# Patient Record
Sex: FEMALE | Race: BLACK OR AFRICAN AMERICAN | NOT HISPANIC OR LATINO
[De-identification: names, ages, dates, MRNs, and addresses within clinical notes are randomized per-mention and may not be internally consistent; named-entity substitution may affect disease eponyms.]

---

## 2017-01-09 ENCOUNTER — MEDICATION RENEWAL (OUTPATIENT)
Age: 72
End: 2017-01-09

## 2017-01-26 ENCOUNTER — APPOINTMENT (OUTPATIENT)
Dept: ENDOCRINOLOGY | Facility: CLINIC | Age: 72
End: 2017-01-26

## 2017-01-26 VITALS
WEIGHT: 172.2 LBS | HEIGHT: 64 IN | SYSTOLIC BLOOD PRESSURE: 110 MMHG | HEART RATE: 82 BPM | OXYGEN SATURATION: 96 % | DIASTOLIC BLOOD PRESSURE: 60 MMHG | BODY MASS INDEX: 29.4 KG/M2

## 2017-01-26 DIAGNOSIS — Z83.3 FAMILY HISTORY OF DIABETES MELLITUS: ICD-10-CM

## 2017-01-30 ENCOUNTER — RX RENEWAL (OUTPATIENT)
Age: 72
End: 2017-01-30

## 2017-02-13 ENCOUNTER — APPOINTMENT (OUTPATIENT)
Dept: NEUROLOGY | Facility: CLINIC | Age: 72
End: 2017-02-13

## 2017-02-13 VITALS
DIASTOLIC BLOOD PRESSURE: 54 MMHG | HEART RATE: 67 BPM | WEIGHT: 172 LBS | HEIGHT: 64 IN | SYSTOLIC BLOOD PRESSURE: 94 MMHG | BODY MASS INDEX: 29.37 KG/M2

## 2017-02-13 RX ORDER — AMMONIUM LACTATE 12 %
12 CREAM (GRAM) TOPICAL TWICE DAILY
Qty: 60 | Refills: 1 | Status: ACTIVE | COMMUNITY
Start: 2017-01-26

## 2017-02-28 ENCOUNTER — APPOINTMENT (OUTPATIENT)
Dept: NEUROLOGY | Facility: CLINIC | Age: 72
End: 2017-02-28

## 2017-03-30 ENCOUNTER — APPOINTMENT (OUTPATIENT)
Dept: NEUROLOGY | Facility: CLINIC | Age: 72
End: 2017-03-30

## 2017-04-04 ENCOUNTER — APPOINTMENT (OUTPATIENT)
Dept: NEUROLOGY | Facility: CLINIC | Age: 72
End: 2017-04-04

## 2017-04-15 ENCOUNTER — RX RENEWAL (OUTPATIENT)
Age: 72
End: 2017-04-15

## 2017-04-26 ENCOUNTER — RESULT CHARGE (OUTPATIENT)
Age: 72
End: 2017-04-26

## 2017-04-26 ENCOUNTER — APPOINTMENT (OUTPATIENT)
Dept: ENDOCRINOLOGY | Facility: CLINIC | Age: 72
End: 2017-04-26

## 2017-04-26 VITALS
DIASTOLIC BLOOD PRESSURE: 60 MMHG | SYSTOLIC BLOOD PRESSURE: 100 MMHG | BODY MASS INDEX: 28.34 KG/M2 | HEIGHT: 64 IN | WEIGHT: 166 LBS | OXYGEN SATURATION: 98 % | HEART RATE: 68 BPM

## 2017-04-26 LAB
GLUCOSE BLDC GLUCOMTR-MCNC: 137
HBA1C MFR BLD HPLC: 6.6

## 2017-05-02 ENCOUNTER — APPOINTMENT (OUTPATIENT)
Dept: NEUROLOGY | Facility: CLINIC | Age: 72
End: 2017-05-02

## 2017-05-18 ENCOUNTER — APPOINTMENT (OUTPATIENT)
Dept: CT IMAGING | Facility: IMAGING CENTER | Age: 72
End: 2017-05-18

## 2017-06-17 ENCOUNTER — APPOINTMENT (OUTPATIENT)
Dept: CT IMAGING | Facility: CLINIC | Age: 72
End: 2017-06-17

## 2017-07-14 ENCOUNTER — RX RENEWAL (OUTPATIENT)
Age: 72
End: 2017-07-14

## 2017-08-17 ENCOUNTER — EMERGENCY (EMERGENCY)
Facility: HOSPITAL | Age: 72
LOS: 0 days | Discharge: ANOTHER HOSPITAL | End: 2017-08-17
Attending: EMERGENCY MEDICINE
Payer: MEDICARE

## 2017-08-17 ENCOUNTER — EMERGENCY (EMERGENCY)
Facility: HOSPITAL | Age: 72
LOS: 1 days | Discharge: HOME | End: 2017-08-17
Attending: EMERGENCY MEDICINE | Admitting: EMERGENCY MEDICINE
Payer: MEDICARE

## 2017-08-17 VITALS
RESPIRATION RATE: 18 BRPM | DIASTOLIC BLOOD PRESSURE: 70 MMHG | OXYGEN SATURATION: 99 % | HEART RATE: 62 BPM | TEMPERATURE: 98 F | SYSTOLIC BLOOD PRESSURE: 144 MMHG

## 2017-08-17 VITALS
SYSTOLIC BLOOD PRESSURE: 125 MMHG | TEMPERATURE: 99 F | WEIGHT: 160.06 LBS | RESPIRATION RATE: 16 BRPM | OXYGEN SATURATION: 98 % | HEIGHT: 62 IN | DIASTOLIC BLOOD PRESSURE: 67 MMHG | HEART RATE: 64 BPM

## 2017-08-17 VITALS
HEART RATE: 67 BPM | RESPIRATION RATE: 16 BRPM | TEMPERATURE: 99 F | DIASTOLIC BLOOD PRESSURE: 67 MMHG | SYSTOLIC BLOOD PRESSURE: 122 MMHG | OXYGEN SATURATION: 100 %

## 2017-08-17 DIAGNOSIS — Z95.5 PRESENCE OF CORONARY ANGIOPLASTY IMPLANT AND GRAFT: Chronic | ICD-10-CM

## 2017-08-17 DIAGNOSIS — Z95.0 PRESENCE OF CARDIAC PACEMAKER: Chronic | ICD-10-CM

## 2017-08-17 DIAGNOSIS — Z95.810 PRESENCE OF AUTOMATIC (IMPLANTABLE) CARDIAC DEFIBRILLATOR: Chronic | ICD-10-CM

## 2017-08-17 LAB
ALBUMIN SERPL ELPH-MCNC: 3.5 G/DL — SIGNIFICANT CHANGE UP (ref 3.3–5)
ALP SERPL-CCNC: 46 U/L — SIGNIFICANT CHANGE UP (ref 40–120)
ALT FLD-CCNC: 45 U/L — SIGNIFICANT CHANGE UP (ref 12–78)
ANION GAP SERPL CALC-SCNC: 7 MMOL/L — SIGNIFICANT CHANGE UP (ref 5–17)
APTT BLD: 29.2 SEC — SIGNIFICANT CHANGE UP (ref 27.5–37.4)
AST SERPL-CCNC: 39 U/L — HIGH (ref 15–37)
BASE EXCESS BLDV CALC-SCNC: 6.8 MMOL/L — SIGNIFICANT CHANGE UP
BASOPHILS # BLD AUTO: 0.04 K/UL — SIGNIFICANT CHANGE UP (ref 0–0.2)
BASOPHILS # BLD AUTO: 0.1 K/UL — SIGNIFICANT CHANGE UP (ref 0–0.2)
BASOPHILS NFR BLD AUTO: 0.5 % — SIGNIFICANT CHANGE UP (ref 0–2)
BASOPHILS NFR BLD AUTO: 1.2 % — SIGNIFICANT CHANGE UP (ref 0–2)
BILIRUB SERPL-MCNC: 0.6 MG/DL — SIGNIFICANT CHANGE UP (ref 0.2–1.2)
BLOOD GAS VENOUS - CREATININE: 1.1 MG/DL — SIGNIFICANT CHANGE UP (ref 0.5–1.3)
BUN SERPL-MCNC: 25 MG/DL — HIGH (ref 7–23)
CALCIUM SERPL-MCNC: 9.4 MG/DL — SIGNIFICANT CHANGE UP (ref 8.5–10.1)
CHLORIDE BLDV-SCNC: 102 MMOL/L — SIGNIFICANT CHANGE UP (ref 96–108)
CHLORIDE SERPL-SCNC: 102 MMOL/L — SIGNIFICANT CHANGE UP (ref 96–108)
CK SERPL-CCNC: 595 U/L — HIGH (ref 25–170)
CO2 SERPL-SCNC: 33 MMOL/L — HIGH (ref 22–31)
CREAT SERPL-MCNC: 1.4 MG/DL — HIGH (ref 0.5–1.3)
EOSINOPHIL # BLD AUTO: 0.1 K/UL — SIGNIFICANT CHANGE UP (ref 0–0.5)
EOSINOPHIL # BLD AUTO: 0.13 K/UL — SIGNIFICANT CHANGE UP (ref 0–0.5)
EOSINOPHIL NFR BLD AUTO: 1.7 % — SIGNIFICANT CHANGE UP (ref 0–6)
EOSINOPHIL NFR BLD AUTO: 2.2 % — SIGNIFICANT CHANGE UP (ref 0–6)
GAS PNL BLDV: 140 MMOL/L — SIGNIFICANT CHANGE UP (ref 136–146)
GLUCOSE BLDV-MCNC: 100 — HIGH (ref 70–99)
GLUCOSE SERPL-MCNC: 109 MG/DL — HIGH (ref 70–99)
HCO3 BLDV-SCNC: 29 MMOL/L — HIGH (ref 20–27)
HCT VFR BLD CALC: 37.7 % — SIGNIFICANT CHANGE UP (ref 34.5–45)
HCT VFR BLD CALC: 39.8 % — SIGNIFICANT CHANGE UP (ref 34.5–45)
HCT VFR BLDV CALC: 41.1 % — SIGNIFICANT CHANGE UP (ref 34.5–45)
HGB BLD-MCNC: 13 G/DL — SIGNIFICANT CHANGE UP (ref 11.5–15.5)
HGB BLD-MCNC: 13.4 G/DL — SIGNIFICANT CHANGE UP (ref 11.5–15.5)
HGB BLDV-MCNC: 13.4 G/DL — SIGNIFICANT CHANGE UP (ref 11.5–15.5)
IMM GRANULOCYTES # BLD AUTO: 0.02 # — SIGNIFICANT CHANGE UP
IMM GRANULOCYTES NFR BLD AUTO: 0.3 % — SIGNIFICANT CHANGE UP (ref 0–1.5)
INR BLD: 1.02 RATIO — SIGNIFICANT CHANGE UP (ref 0.88–1.16)
LACTATE BLDV-MCNC: 1 MMOL/L — SIGNIFICANT CHANGE UP (ref 0.5–2)
LYMPHOCYTES # BLD AUTO: 1.5 K/UL — SIGNIFICANT CHANGE UP (ref 1–3.3)
LYMPHOCYTES # BLD AUTO: 2.03 K/UL — SIGNIFICANT CHANGE UP (ref 1–3.3)
LYMPHOCYTES # BLD AUTO: 22.4 % — SIGNIFICANT CHANGE UP (ref 13–44)
LYMPHOCYTES # BLD AUTO: 26.7 % — SIGNIFICANT CHANGE UP (ref 13–44)
MCHC RBC-ENTMCNC: 31.5 PG — SIGNIFICANT CHANGE UP (ref 27–34)
MCHC RBC-ENTMCNC: 32.4 PG — SIGNIFICANT CHANGE UP (ref 27–34)
MCHC RBC-ENTMCNC: 33.7 % — SIGNIFICANT CHANGE UP (ref 32–36)
MCHC RBC-ENTMCNC: 34.4 GM/DL — SIGNIFICANT CHANGE UP (ref 32–36)
MCV RBC AUTO: 93.4 FL — SIGNIFICANT CHANGE UP (ref 80–100)
MCV RBC AUTO: 94.2 FL — SIGNIFICANT CHANGE UP (ref 80–100)
MONOCYTES # BLD AUTO: 0.79 K/UL — SIGNIFICANT CHANGE UP (ref 0–0.9)
MONOCYTES # BLD AUTO: 0.8 K/UL — SIGNIFICANT CHANGE UP (ref 0–0.9)
MONOCYTES NFR BLD AUTO: 10.4 % — SIGNIFICANT CHANGE UP (ref 2–14)
MONOCYTES NFR BLD AUTO: 11.3 % — SIGNIFICANT CHANGE UP (ref 2–14)
NEUTROPHILS # BLD AUTO: 4.3 K/UL — SIGNIFICANT CHANGE UP (ref 1.8–7.4)
NEUTROPHILS # BLD AUTO: 4.58 K/UL — SIGNIFICANT CHANGE UP (ref 1.8–7.4)
NEUTROPHILS NFR BLD AUTO: 60.4 % — SIGNIFICANT CHANGE UP (ref 43–77)
NEUTROPHILS NFR BLD AUTO: 62.9 % — SIGNIFICANT CHANGE UP (ref 43–77)
NRBC # FLD: 0 — SIGNIFICANT CHANGE UP
PCO2 BLDV: 58 MMHG — HIGH (ref 41–51)
PH BLDV: 7.36 PH — SIGNIFICANT CHANGE UP (ref 7.32–7.43)
PLATELET # BLD AUTO: 163 K/UL — SIGNIFICANT CHANGE UP (ref 150–400)
PLATELET # BLD AUTO: 185 K/UL — SIGNIFICANT CHANGE UP (ref 150–400)
PMV BLD: 10.4 FL — SIGNIFICANT CHANGE UP (ref 7–13)
PO2 BLDV: 38 MMHG — SIGNIFICANT CHANGE UP (ref 35–40)
POTASSIUM BLDV-SCNC: 3.2 MMOL/L — LOW (ref 3.4–4.5)
POTASSIUM SERPL-MCNC: 3.4 MMOL/L — LOW (ref 3.5–5.3)
POTASSIUM SERPL-SCNC: 3.4 MMOL/L — LOW (ref 3.5–5.3)
PROT SERPL-MCNC: 8.3 GM/DL — SIGNIFICANT CHANGE UP (ref 6–8.3)
PROTHROM AB SERPL-ACNC: 11.1 SEC — SIGNIFICANT CHANGE UP (ref 9.8–12.7)
RBC # BLD: 4.01 M/UL — SIGNIFICANT CHANGE UP (ref 3.8–5.2)
RBC # BLD: 4.26 M/UL — SIGNIFICANT CHANGE UP (ref 3.8–5.2)
RBC # FLD: 12.5 % — SIGNIFICANT CHANGE UP (ref 11–15)
RBC # FLD: 13.6 % — SIGNIFICANT CHANGE UP (ref 10.3–14.5)
SAO2 % BLDV: 65.1 % — SIGNIFICANT CHANGE UP (ref 60–85)
SODIUM SERPL-SCNC: 142 MMOL/L — SIGNIFICANT CHANGE UP (ref 135–145)
WBC # BLD: 6.8 K/UL — SIGNIFICANT CHANGE UP (ref 3.8–10.5)
WBC # BLD: 7.59 K/UL — SIGNIFICANT CHANGE UP (ref 3.8–10.5)
WBC # FLD AUTO: 6.8 K/UL — SIGNIFICANT CHANGE UP (ref 3.8–10.5)
WBC # FLD AUTO: 7.59 K/UL — SIGNIFICANT CHANGE UP (ref 3.8–10.5)

## 2017-08-17 PROCEDURE — 99284 EMERGENCY DEPT VISIT MOD MDM: CPT | Mod: 25

## 2017-08-17 PROCEDURE — 70490 CT SOFT TISSUE NECK W/O DYE: CPT | Mod: 26

## 2017-08-17 PROCEDURE — 71010: CPT | Mod: 26

## 2017-08-17 PROCEDURE — 93010 ELECTROCARDIOGRAM REPORT: CPT | Mod: 59

## 2017-08-17 PROCEDURE — 71250 CT THORAX DX C-: CPT | Mod: 26

## 2017-08-17 PROCEDURE — 99285 EMERGENCY DEPT VISIT HI MDM: CPT

## 2017-08-17 PROCEDURE — 70360 X-RAY EXAM OF NECK: CPT | Mod: 26

## 2017-08-17 RX ORDER — TETANUS TOXOID, REDUCED DIPHTHERIA TOXOID AND ACELLULAR PERTUSSIS VACCINE, ADSORBED 5; 2.5; 8; 8; 2.5 [IU]/.5ML; [IU]/.5ML; UG/.5ML; UG/.5ML; UG/.5ML
0.5 SUSPENSION INTRAMUSCULAR ONCE
Qty: 0 | Refills: 0 | Status: COMPLETED | OUTPATIENT
Start: 2017-08-17 | End: 2017-08-17

## 2017-08-17 RX ORDER — TETANUS AND DIPHTHERIA TOXOIDS ADSORBED 2; 2 [LF]/.5ML; [LF]/.5ML
0.5 INJECTION INTRAMUSCULAR ONCE
Qty: 0 | Refills: 0 | Status: DISCONTINUED | OUTPATIENT
Start: 2017-08-17 | End: 2017-08-17

## 2017-08-17 RX ADMIN — TETANUS TOXOID, REDUCED DIPHTHERIA TOXOID AND ACELLULAR PERTUSSIS VACCINE, ADSORBED 0.5 MILLILITER(S): 5; 2.5; 8; 8; 2.5 SUSPENSION INTRAMUSCULAR at 17:03

## 2017-08-17 RX ADMIN — Medication 100 MILLIGRAM(S): at 16:19

## 2017-08-17 NOTE — ED PROVIDER NOTE - OBJECTIVE STATEMENT
73 yo female presents with foreign body sensation to throat after eating fish. Patient denies chest pain, nausea. + difficulty handling secretions. Patient denies any other complaints.

## 2017-08-17 NOTE — ED PROVIDER NOTE - OBJECTIVE STATEMENT
73 yo F presenting with CC of throat pain, as well as chest pain and abdominal pain x 1 day. Pain began after patient swallowed fish bone while eating lunch. She was taken to [facility] where a clear airway was noted, with no stridor. A CT neck soft tissue done there showed no bone in the throat. A CT chest showed no foreign bodies in the esophagus, but did show a 1.3cm linear density in the proximal transverse colon. Patient was accepted for examination at [facility] by ENT Dr. Garza.     Patient was given a one time dose of clindamycin at [facility], as well as the TDAP vaccine. EKG done at [place] showed AV paced electronic pacemaker regular rhythm.     Endorses throat pain, chest pain, ab pain, nausea, and anxiety. Denies SOB.    In [facility] ER, patient continues to complain of throat, chest, and abdominal pain. Throat pain described as 10/10 in severity, with no radiation. Chest pain described as something stuck in chest, with no radiation.

## 2017-08-17 NOTE — ED ADULT NURSE NOTE - CCCP TRG CHIEF CMPLNT
feels fish bone stuck in throat seen by [facility] transfer  for higher level of care/sore throat

## 2017-08-17 NOTE — ED PROVIDER NOTE - PROGRESS NOTE DETAILS
Page GI by Dr. Varma repage GI page gen surg x 2, no response from GI d/w surgery, possible incidental finding, no rectal bleeding, tolerating PO, pt cleared for outpatient f/u.  Advised on CT results and need to f/u w/ PMD and GI.

## 2017-08-17 NOTE — ED ADULT NURSE NOTE - OBJECTIVE STATEMENT
rec'd pt in room 11 as tfr from [facility]. pt rports swallowed a fish bone at lunch and feels pain to throat and epigastrum,.  also has pain to chest but did not tell them at [facility]. pt had runs of v-tach. md aware.  placed on monitor. pt seen by ent.  blood being drawn.  ekg done. pieter

## 2017-08-17 NOTE — ED PROVIDER NOTE - MEDICAL DECISION MAKING DETAILS
MDM below 71 yo F presenting as transfer from [facility] for throat pain secondary to swallowing fish bone. No foreign body present on CT scan at [facility] and no foreign body observed on examination by ENT in [facility] ER. CT scan from [place] with evidence of 1.3 cm linear body in transverse colon. Will consult GI about any required interventions. MDM below

## 2017-08-17 NOTE — ED ADULT NURSE REASSESSMENT NOTE - NS ED NURSE REASSESS COMMENT FT1
Received in stretcher. A &Ox4. VSS.  No distress noted. Quietly resting. Being transferred via [facility] to [facility]. Report given prior by Katiuska Haider RN. Left arm #20 noted

## 2017-08-17 NOTE — ED PROVIDER NOTE - PMH
CAD (coronary artery disease)  s/p stent of mLAD, rPDA, rPLS  Chronic congestive heart failure, unspecified congestive heart failure type  s/p AICD  DM (diabetes mellitus)    HLD (hyperlipidemia)    HTN (hypertension)

## 2017-08-17 NOTE — ED PROVIDER NOTE - ATTENDING CONTRIBUTION TO CARE
Ramao  pt s/p fishbone ingestion with throat anfd low CP after ingestion  CI  shows ?bone on transverse colon  seen here and cleared by ENT  EKG paced  will check troponin (though CP coincident with ingestion and likely due to irritation)

## 2017-08-18 VITALS
OXYGEN SATURATION: 100 % | RESPIRATION RATE: 16 BRPM | SYSTOLIC BLOOD PRESSURE: 126 MMHG | DIASTOLIC BLOOD PRESSURE: 61 MMHG | HEART RATE: 66 BPM

## 2017-08-18 DIAGNOSIS — I48.91 UNSPECIFIED ATRIAL FIBRILLATION: ICD-10-CM

## 2017-08-18 DIAGNOSIS — E78.5 HYPERLIPIDEMIA, UNSPECIFIED: ICD-10-CM

## 2017-08-18 DIAGNOSIS — E11.9 TYPE 2 DIABETES MELLITUS WITHOUT COMPLICATIONS: ICD-10-CM

## 2017-08-18 DIAGNOSIS — R09.89 OTHER SPECIFIED SYMPTOMS AND SIGNS INVOLVING THE CIRCULATORY AND RESPIRATORY SYSTEMS: ICD-10-CM

## 2017-08-18 DIAGNOSIS — I10 ESSENTIAL (PRIMARY) HYPERTENSION: ICD-10-CM

## 2017-08-18 LAB
ALBUMIN SERPL ELPH-MCNC: 4 G/DL — SIGNIFICANT CHANGE UP (ref 3.3–5)
ALP SERPL-CCNC: 41 U/L — SIGNIFICANT CHANGE UP (ref 40–120)
ALT FLD-CCNC: 30 U/L — SIGNIFICANT CHANGE UP (ref 4–33)
AST SERPL-CCNC: 38 U/L — HIGH (ref 4–32)
BILIRUB SERPL-MCNC: 0.5 MG/DL — SIGNIFICANT CHANGE UP (ref 0.2–1.2)
BUN SERPL-MCNC: 23 MG/DL — SIGNIFICANT CHANGE UP (ref 7–23)
CALCIUM SERPL-MCNC: 10.1 MG/DL — SIGNIFICANT CHANGE UP (ref 8.4–10.5)
CHLORIDE SERPL-SCNC: 101 MMOL/L — SIGNIFICANT CHANGE UP (ref 98–107)
CK MB BLD-MCNC: 0.9 — SIGNIFICANT CHANGE UP (ref 0–2.5)
CK MB BLD-MCNC: 5.23 NG/ML — HIGH (ref 1–4.7)
CO2 SERPL-SCNC: 30 MMOL/L — SIGNIFICANT CHANGE UP (ref 22–31)
CREAT SERPL-MCNC: 1.2 MG/DL — SIGNIFICANT CHANGE UP (ref 0.5–1.3)
GLUCOSE SERPL-MCNC: 99 MG/DL — SIGNIFICANT CHANGE UP (ref 70–99)
POTASSIUM SERPL-MCNC: 3.2 MMOL/L — LOW (ref 3.5–5.3)
POTASSIUM SERPL-SCNC: 3.2 MMOL/L — LOW (ref 3.5–5.3)
PROT SERPL-MCNC: 7.8 G/DL — SIGNIFICANT CHANGE UP (ref 6–8.3)
SODIUM SERPL-SCNC: 145 MMOL/L — SIGNIFICANT CHANGE UP (ref 135–145)
TROPONIN T SERPL-MCNC: < 0.06 NG/ML — SIGNIFICANT CHANGE UP (ref 0–0.06)

## 2017-08-18 PROCEDURE — 93010 ELECTROCARDIOGRAM REPORT: CPT

## 2017-08-18 RX ORDER — POTASSIUM CHLORIDE 20 MEQ
40 PACKET (EA) ORAL ONCE
Qty: 0 | Refills: 0 | Status: COMPLETED | OUTPATIENT
Start: 2017-08-18 | End: 2017-08-18

## 2017-08-18 RX ADMIN — Medication 40 MILLIEQUIVALENT(S): at 09:33

## 2017-08-18 NOTE — ED ADULT NURSE REASSESSMENT NOTE - CONDITION
pt resting quietly. no c/o pain, stridor, drooling, sob. waiting for gi in am. no reports of vtach...

## 2017-09-07 ENCOUNTER — APPOINTMENT (OUTPATIENT)
Dept: ENDOCRINOLOGY | Facility: CLINIC | Age: 72
End: 2017-09-07
Payer: MEDICARE

## 2017-09-07 VITALS
WEIGHT: 166 LBS | DIASTOLIC BLOOD PRESSURE: 68 MMHG | BODY MASS INDEX: 28.34 KG/M2 | OXYGEN SATURATION: 97 % | SYSTOLIC BLOOD PRESSURE: 120 MMHG | HEIGHT: 64 IN | HEART RATE: 68 BPM

## 2017-09-07 LAB
GLUCOSE BLDC GLUCOMTR-MCNC: 125
HBA1C MFR BLD HPLC: 6.6

## 2017-09-07 PROCEDURE — 83036 HEMOGLOBIN GLYCOSYLATED A1C: CPT | Mod: QW

## 2017-09-07 PROCEDURE — 82962 GLUCOSE BLOOD TEST: CPT

## 2017-09-07 PROCEDURE — 99214 OFFICE O/P EST MOD 30 MIN: CPT

## 2017-09-07 RX ORDER — INSULIN DETEMIR 100 [IU]/ML
100 INJECTION, SOLUTION SUBCUTANEOUS
Qty: 5 | Refills: 3 | Status: DISCONTINUED | COMMUNITY
Start: 2017-04-26 | End: 2017-09-07

## 2017-10-31 ENCOUNTER — APPOINTMENT (OUTPATIENT)
Dept: NEUROLOGY | Facility: CLINIC | Age: 72
End: 2017-10-31
Payer: MEDICARE

## 2017-10-31 VITALS — DIASTOLIC BLOOD PRESSURE: 67 MMHG | SYSTOLIC BLOOD PRESSURE: 103 MMHG | HEART RATE: 68 BPM

## 2017-10-31 PROCEDURE — 99213 OFFICE O/P EST LOW 20 MIN: CPT

## 2017-12-18 ENCOUNTER — APPOINTMENT (OUTPATIENT)
Dept: ENDOCRINOLOGY | Facility: CLINIC | Age: 72
End: 2017-12-18
Payer: MEDICARE

## 2017-12-18 VITALS
BODY MASS INDEX: 28 KG/M2 | HEART RATE: 70 BPM | DIASTOLIC BLOOD PRESSURE: 76 MMHG | SYSTOLIC BLOOD PRESSURE: 110 MMHG | WEIGHT: 164 LBS | OXYGEN SATURATION: 97 % | HEIGHT: 64 IN

## 2017-12-18 LAB
GLUCOSE BLDC GLUCOMTR-MCNC: 112
HBA1C MFR BLD HPLC: 6.7

## 2017-12-18 PROCEDURE — 82962 GLUCOSE BLOOD TEST: CPT

## 2017-12-18 PROCEDURE — 99214 OFFICE O/P EST MOD 30 MIN: CPT | Mod: 25

## 2017-12-18 PROCEDURE — 83036 HEMOGLOBIN GLYCOSYLATED A1C: CPT | Mod: QW

## 2017-12-27 LAB
ALBUMIN SERPL ELPH-MCNC: 4.4 G/DL
ALP BLD-CCNC: 50 U/L
ALT SERPL-CCNC: 17 U/L
ANION GAP SERPL CALC-SCNC: 12 MMOL/L
AST SERPL-CCNC: 24 U/L
BILIRUB SERPL-MCNC: 0.3 MG/DL
BUN SERPL-MCNC: 69 MG/DL
CALCIUM SERPL-MCNC: 10.3 MG/DL
CHLORIDE SERPL-SCNC: 101 MMOL/L
CO2 SERPL-SCNC: 28 MMOL/L
CREAT SERPL-MCNC: 1.81 MG/DL
CREAT SPEC-SCNC: 50 MG/DL
GLUCOSE SERPL-MCNC: 112 MG/DL
MICROALBUMIN 24H UR DL<=1MG/L-MCNC: 0.8 MG/DL
MICROALBUMIN/CREAT 24H UR-RTO: 16 MG/G
POTASSIUM SERPL-SCNC: 4.9 MMOL/L
PROT SERPL-MCNC: 8.3 G/DL
SODIUM SERPL-SCNC: 141 MMOL/L

## 2018-01-18 ENCOUNTER — RX RENEWAL (OUTPATIENT)
Age: 73
End: 2018-01-18

## 2018-01-23 ENCOUNTER — APPOINTMENT (OUTPATIENT)
Dept: NEUROLOGY | Facility: CLINIC | Age: 73
End: 2018-01-23

## 2018-02-02 ENCOUNTER — RX RENEWAL (OUTPATIENT)
Age: 73
End: 2018-02-02

## 2018-02-12 ENCOUNTER — APPOINTMENT (OUTPATIENT)
Dept: CT IMAGING | Facility: CLINIC | Age: 73
End: 2018-02-12

## 2018-02-13 ENCOUNTER — INPATIENT (INPATIENT)
Facility: HOSPITAL | Age: 73
LOS: 4 days | Discharge: HOME | DRG: 315 | End: 2018-02-18
Attending: INTERNAL MEDICINE | Admitting: INTERNAL MEDICINE
Payer: MEDICARE

## 2018-02-13 ENCOUNTER — APPOINTMENT (OUTPATIENT)
Dept: NEPHROLOGY | Facility: CLINIC | Age: 73
End: 2018-02-13
Payer: MEDICARE

## 2018-02-13 VITALS
WEIGHT: 163.14 LBS | DIASTOLIC BLOOD PRESSURE: 58 MMHG | SYSTOLIC BLOOD PRESSURE: 79 MMHG | BODY MASS INDEX: 27.85 KG/M2 | HEART RATE: 91 BPM | OXYGEN SATURATION: 99 % | HEIGHT: 64 IN

## 2018-02-13 VITALS
SYSTOLIC BLOOD PRESSURE: 115 MMHG | OXYGEN SATURATION: 98 % | TEMPERATURE: 99 F | HEIGHT: 64 IN | HEART RATE: 85 BPM | WEIGHT: 160.06 LBS | DIASTOLIC BLOOD PRESSURE: 73 MMHG | RESPIRATION RATE: 26 BRPM

## 2018-02-13 DIAGNOSIS — Z95.0 PRESENCE OF CARDIAC PACEMAKER: Chronic | ICD-10-CM

## 2018-02-13 DIAGNOSIS — I95.9 HYPOTENSION, UNSPECIFIED: ICD-10-CM

## 2018-02-13 DIAGNOSIS — Z95.810 PRESENCE OF AUTOMATIC (IMPLANTABLE) CARDIAC DEFIBRILLATOR: Chronic | ICD-10-CM

## 2018-02-13 DIAGNOSIS — Z95.5 PRESENCE OF CORONARY ANGIOPLASTY IMPLANT AND GRAFT: Chronic | ICD-10-CM

## 2018-02-13 LAB
ALBUMIN SERPL ELPH-MCNC: 4.1 G/DL — SIGNIFICANT CHANGE UP (ref 3.3–5)
ALP SERPL-CCNC: 50 U/L — SIGNIFICANT CHANGE UP (ref 40–120)
ALT FLD-CCNC: 22 U/L RC — SIGNIFICANT CHANGE UP (ref 10–45)
ANION GAP SERPL CALC-SCNC: 14 MMOL/L — SIGNIFICANT CHANGE UP (ref 5–17)
APTT BLD: 28.4 SEC — SIGNIFICANT CHANGE UP (ref 27.5–37.4)
AST SERPL-CCNC: 29 U/L — SIGNIFICANT CHANGE UP (ref 10–40)
BASE EXCESS BLDV CALC-SCNC: 3.4 MMOL/L — HIGH (ref -2–2)
BASOPHILS # BLD AUTO: 0.1 K/UL — SIGNIFICANT CHANGE UP (ref 0–0.2)
BASOPHILS NFR BLD AUTO: 0.9 % — SIGNIFICANT CHANGE UP (ref 0–2)
BILIRUB SERPL-MCNC: 0.4 MG/DL — SIGNIFICANT CHANGE UP (ref 0.2–1.2)
BUN SERPL-MCNC: 37 MG/DL — HIGH (ref 7–23)
CA-I SERPL-SCNC: 1.24 MMOL/L — SIGNIFICANT CHANGE UP (ref 1.12–1.3)
CALCIUM SERPL-MCNC: 10.2 MG/DL — SIGNIFICANT CHANGE UP (ref 8.4–10.5)
CHLORIDE BLDV-SCNC: 102 MMOL/L — SIGNIFICANT CHANGE UP (ref 96–108)
CHLORIDE SERPL-SCNC: 97 MMOL/L — SIGNIFICANT CHANGE UP (ref 96–108)
CO2 BLDV-SCNC: 32 MMOL/L — HIGH (ref 22–30)
CO2 SERPL-SCNC: 26 MMOL/L — SIGNIFICANT CHANGE UP (ref 22–31)
CREAT SERPL-MCNC: 1.66 MG/DL — HIGH (ref 0.5–1.3)
EOSINOPHIL # BLD AUTO: 0.2 K/UL — SIGNIFICANT CHANGE UP (ref 0–0.5)
EOSINOPHIL NFR BLD AUTO: 1.6 % — SIGNIFICANT CHANGE UP (ref 0–6)
GAS PNL BLDV: 135 MMOL/L — LOW (ref 136–145)
GAS PNL BLDV: SIGNIFICANT CHANGE UP
GAS PNL BLDV: SIGNIFICANT CHANGE UP
GLUCOSE BLDV-MCNC: 134 MG/DL — HIGH (ref 70–99)
GLUCOSE SERPL-MCNC: 130 MG/DL — HIGH (ref 70–99)
HCO3 BLDV-SCNC: 30 MMOL/L — HIGH (ref 21–29)
HCT VFR BLD CALC: 38.1 % — SIGNIFICANT CHANGE UP (ref 34.5–45)
HCT VFR BLDA CALC: 40 % — SIGNIFICANT CHANGE UP (ref 39–50)
HGB BLD CALC-MCNC: 13 G/DL — SIGNIFICANT CHANGE UP (ref 11.5–15.5)
HGB BLD-MCNC: 12.8 G/DL — SIGNIFICANT CHANGE UP (ref 11.5–15.5)
HOROWITZ INDEX BLDV+IHG-RTO: SIGNIFICANT CHANGE UP
INR BLD: 1.06 RATIO — SIGNIFICANT CHANGE UP (ref 0.88–1.16)
LACTATE BLDV-MCNC: 1.8 MMOL/L — SIGNIFICANT CHANGE UP (ref 0.7–2)
LYMPHOCYTES # BLD AUTO: 2.3 K/UL — SIGNIFICANT CHANGE UP (ref 1–3.3)
LYMPHOCYTES # BLD AUTO: 24.8 % — SIGNIFICANT CHANGE UP (ref 13–44)
MCHC RBC-ENTMCNC: 32.6 PG — SIGNIFICANT CHANGE UP (ref 27–34)
MCHC RBC-ENTMCNC: 33.6 GM/DL — SIGNIFICANT CHANGE UP (ref 32–36)
MCV RBC AUTO: 97.1 FL — SIGNIFICANT CHANGE UP (ref 80–100)
MONOCYTES # BLD AUTO: 0.9 K/UL — SIGNIFICANT CHANGE UP (ref 0–0.9)
MONOCYTES NFR BLD AUTO: 10.1 % — SIGNIFICANT CHANGE UP (ref 2–14)
NEUTROPHILS # BLD AUTO: 5.8 K/UL — SIGNIFICANT CHANGE UP (ref 1.8–7.4)
NEUTROPHILS NFR BLD AUTO: 62.7 % — SIGNIFICANT CHANGE UP (ref 43–77)
PCO2 BLDV: 57 MMHG — HIGH (ref 35–50)
PH BLDV: 7.34 — LOW (ref 7.35–7.45)
PLATELET # BLD AUTO: 164 K/UL — SIGNIFICANT CHANGE UP (ref 150–400)
PO2 BLDV: 30 MMHG — SIGNIFICANT CHANGE UP (ref 25–45)
POTASSIUM BLDV-SCNC: 4.2 MMOL/L — SIGNIFICANT CHANGE UP (ref 3.5–5)
POTASSIUM SERPL-MCNC: 4.3 MMOL/L — SIGNIFICANT CHANGE UP (ref 3.5–5.3)
POTASSIUM SERPL-SCNC: 4.3 MMOL/L — SIGNIFICANT CHANGE UP (ref 3.5–5.3)
PROT SERPL-MCNC: 8.5 G/DL — HIGH (ref 6–8.3)
PROTHROM AB SERPL-ACNC: 11.5 SEC — SIGNIFICANT CHANGE UP (ref 9.8–12.7)
RBC # BLD: 3.93 M/UL — SIGNIFICANT CHANGE UP (ref 3.8–5.2)
RBC # FLD: 12.7 % — SIGNIFICANT CHANGE UP (ref 10.3–14.5)
SAO2 % BLDV: 49 % — LOW (ref 67–88)
SODIUM SERPL-SCNC: 137 MMOL/L — SIGNIFICANT CHANGE UP (ref 135–145)
WBC # BLD: 9.2 K/UL — SIGNIFICANT CHANGE UP (ref 3.8–10.5)
WBC # FLD AUTO: 9.2 K/UL — SIGNIFICANT CHANGE UP (ref 3.8–10.5)

## 2018-02-13 PROCEDURE — 99285 EMERGENCY DEPT VISIT HI MDM: CPT | Mod: 25

## 2018-02-13 PROCEDURE — 93010 ELECTROCARDIOGRAM REPORT: CPT

## 2018-02-13 PROCEDURE — 71046 X-RAY EXAM CHEST 2 VIEWS: CPT | Mod: 26

## 2018-02-13 PROCEDURE — 99204 OFFICE O/P NEW MOD 45 MIN: CPT

## 2018-02-13 RX ORDER — ASPIRIN/CALCIUM CARB/MAGNESIUM 324 MG
81 TABLET ORAL DAILY
Qty: 0 | Refills: 0 | Status: DISCONTINUED | OUTPATIENT
Start: 2018-02-13 | End: 2018-02-18

## 2018-02-13 RX ORDER — INSULIN LISPRO 100/ML
VIAL (ML) SUBCUTANEOUS
Qty: 0 | Refills: 0 | Status: DISCONTINUED | OUTPATIENT
Start: 2018-02-13 | End: 2018-02-18

## 2018-02-13 RX ORDER — METOPROLOL TARTRATE 50 MG
50 TABLET ORAL DAILY
Qty: 0 | Refills: 0 | Status: DISCONTINUED | OUTPATIENT
Start: 2018-02-13 | End: 2018-02-18

## 2018-02-13 RX ORDER — ISOSORBIDE DINITRATE 5 MG/1
20 TABLET ORAL THREE TIMES A DAY
Qty: 0 | Refills: 0 | Status: DISCONTINUED | OUTPATIENT
Start: 2018-02-13 | End: 2018-02-16

## 2018-02-13 RX ORDER — DEXTROSE 50 % IN WATER 50 %
25 SYRINGE (ML) INTRAVENOUS ONCE
Qty: 0 | Refills: 0 | Status: DISCONTINUED | OUTPATIENT
Start: 2018-02-13 | End: 2018-02-18

## 2018-02-13 RX ORDER — ATORVASTATIN CALCIUM 80 MG/1
40 TABLET, FILM COATED ORAL AT BEDTIME
Qty: 0 | Refills: 0 | Status: DISCONTINUED | OUTPATIENT
Start: 2018-02-13 | End: 2018-02-18

## 2018-02-13 RX ORDER — HYDRALAZINE HCL 50 MG
25 TABLET ORAL THREE TIMES A DAY
Qty: 0 | Refills: 0 | Status: DISCONTINUED | OUTPATIENT
Start: 2018-02-13 | End: 2018-02-17

## 2018-02-13 RX ORDER — GLUCAGON INJECTION, SOLUTION 0.5 MG/.1ML
1 INJECTION, SOLUTION SUBCUTANEOUS ONCE
Qty: 0 | Refills: 0 | Status: DISCONTINUED | OUTPATIENT
Start: 2018-02-13 | End: 2018-02-18

## 2018-02-13 RX ORDER — POTASSIUM CHLORIDE 20 MEQ
10 PACKET (EA) ORAL DAILY
Qty: 0 | Refills: 0 | Status: DISCONTINUED | OUTPATIENT
Start: 2018-02-13 | End: 2018-02-18

## 2018-02-13 RX ORDER — FUROSEMIDE 40 MG
20 TABLET ORAL DAILY
Qty: 0 | Refills: 0 | Status: DISCONTINUED | OUTPATIENT
Start: 2018-02-13 | End: 2018-02-18

## 2018-02-13 RX ORDER — INSULIN GLARGINE 100 [IU]/ML
32 INJECTION, SOLUTION SUBCUTANEOUS AT BEDTIME
Qty: 0 | Refills: 0 | Status: DISCONTINUED | OUTPATIENT
Start: 2018-02-13 | End: 2018-02-18

## 2018-02-13 RX ORDER — DOCUSATE SODIUM 100 MG
100 CAPSULE ORAL
Qty: 0 | Refills: 0 | Status: DISCONTINUED | OUTPATIENT
Start: 2018-02-13 | End: 2018-02-18

## 2018-02-13 RX ORDER — INSULIN LISPRO 100/ML
7 VIAL (ML) SUBCUTANEOUS
Qty: 0 | Refills: 0 | Status: DISCONTINUED | OUTPATIENT
Start: 2018-02-13 | End: 2018-02-18

## 2018-02-13 RX ORDER — VALSARTAN 80 MG/1
160 TABLET ORAL DAILY
Qty: 0 | Refills: 0 | Status: DISCONTINUED | OUTPATIENT
Start: 2018-02-13 | End: 2018-02-14

## 2018-02-13 RX ORDER — SPIRONOLACTONE 25 MG/1
25 TABLET, FILM COATED ORAL DAILY
Qty: 0 | Refills: 0 | Status: DISCONTINUED | OUTPATIENT
Start: 2018-02-13 | End: 2018-02-18

## 2018-02-13 RX ORDER — DEXTROSE 50 % IN WATER 50 %
12.5 SYRINGE (ML) INTRAVENOUS ONCE
Qty: 0 | Refills: 0 | Status: DISCONTINUED | OUTPATIENT
Start: 2018-02-13 | End: 2018-02-18

## 2018-02-13 RX ORDER — HEPARIN SODIUM 5000 [USP'U]/ML
5000 INJECTION INTRAVENOUS; SUBCUTANEOUS EVERY 8 HOURS
Qty: 0 | Refills: 0 | Status: DISCONTINUED | OUTPATIENT
Start: 2018-02-13 | End: 2018-02-18

## 2018-02-13 RX ORDER — SODIUM CHLORIDE 9 MG/ML
1000 INJECTION, SOLUTION INTRAVENOUS
Qty: 0 | Refills: 0 | Status: DISCONTINUED | OUTPATIENT
Start: 2018-02-13 | End: 2018-02-18

## 2018-02-13 RX ORDER — INSULIN DETEMIR 100/ML (3)
32 INSULIN PEN (ML) SUBCUTANEOUS AT BEDTIME
Qty: 0 | Refills: 0 | Status: DISCONTINUED | OUTPATIENT
Start: 2018-02-13 | End: 2018-02-13

## 2018-02-13 RX ORDER — KETOROLAC TROMETHAMINE 0.5 %
1 DROPS OPHTHALMIC (EYE) AT BEDTIME
Qty: 0 | Refills: 0 | Status: DISCONTINUED | OUTPATIENT
Start: 2018-02-13 | End: 2018-02-18

## 2018-02-13 RX ORDER — DEXTROSE 50 % IN WATER 50 %
1 SYRINGE (ML) INTRAVENOUS ONCE
Qty: 0 | Refills: 0 | Status: DISCONTINUED | OUTPATIENT
Start: 2018-02-13 | End: 2018-02-18

## 2018-02-13 RX ORDER — SODIUM CHLORIDE 9 MG/ML
500 INJECTION INTRAMUSCULAR; INTRAVENOUS; SUBCUTANEOUS ONCE
Qty: 0 | Refills: 0 | Status: COMPLETED | OUTPATIENT
Start: 2018-02-13 | End: 2018-02-13

## 2018-02-13 RX ADMIN — HEPARIN SODIUM 5000 UNIT(S): 5000 INJECTION INTRAVENOUS; SUBCUTANEOUS at 23:36

## 2018-02-13 RX ADMIN — SODIUM CHLORIDE 500 MILLILITER(S): 9 INJECTION INTRAMUSCULAR; INTRAVENOUS; SUBCUTANEOUS at 16:31

## 2018-02-13 RX ADMIN — Medication 1 DROP(S): at 23:36

## 2018-02-13 RX ADMIN — ISOSORBIDE DINITRATE 20 MILLIGRAM(S): 5 TABLET ORAL at 23:37

## 2018-02-13 RX ADMIN — Medication 25 MILLIGRAM(S): at 23:37

## 2018-02-13 RX ADMIN — ATORVASTATIN CALCIUM 40 MILLIGRAM(S): 80 TABLET, FILM COATED ORAL at 23:36

## 2018-02-13 NOTE — ED ADULT NURSE REASSESSMENT NOTE - NS ED NURSE REASSESS COMMENT FT1
War room contacted [facility] stating pt was in v tach. Pt states she does not have any chest pain, repeat EKG ordered and currently being performed.

## 2018-02-13 NOTE — H&P ADULT - HISTORY OF PRESENT ILLNESS
A 72F hx CHF, HTN, CAD on Effient, DM, c/o of weakness/ hypotension/ elevated BUN/Cr to 69/1.8 at nephrologist Dr Waters's office, with reported B lines on lung US. No recent infection, no fevers/chills, + chest pain at rest, intermittent, not present today, worsened while supine and worsened with exertion. Report SBPs were 70s, at ED SBP 110s. Notes this has been an ongoing issue x weeks, with adjustments in BP medications and lasix, took 40mg lasix PTA

## 2018-02-13 NOTE — ED PROVIDER NOTE - NEURO NEGATIVE STATEMENT, MLM
no loss of consciousness, no gait abnormality, no headache, no sensory deficits. + Generalized weakness.

## 2018-02-13 NOTE — ED ADULT NURSE NOTE - OBJECTIVE STATEMENT
72 year old female presented to ED from Dr. office with c/o of low BP. Pt BP in ED is 111/78. Pt denies CP, SOB, nausea/vomiting, numbness/tingling, fever, cough, chills, dizziness, headache. Pt a&ox3, lung sounds clear, heart rate regular, abdomen soft nontender nondistended to palp. Skin intact. IV in right AC 20G and patent. Pt currently resting in bed with MD and RN at bedside, side rails up for safety. Will continue to monitor and assess while offering support and reassurance.

## 2018-02-13 NOTE — ED PROVIDER NOTE - PROGRESS NOTE DETAILS
MD Marshall:  patient signed out to me by Dr. Ramirez.  71 yo F, admitted for RIKKI, MHx CAD on effient, AICD present.  WAR room called b/c patient had 15-beat run of what appears to be VT on monitor; patient has AICD/PPM in place.  Currently asymptomatic, normal VS.  Primary team paged and made aware.  Repeat ECG ordered. Estuardo PGY1: Attempted to get AICD/ PPM device name/number from patient/ family/ Dr Parish's office without success, paged EP but likely has already left this evening, patient asymptomatic, no chest pain, no SOB, repeat EKG after run of vtach shows paced rhythm Estuardo PGY1: Attempted to get AICD/ PPM device name/number from patient/ family/ Dr Parish's office without success, paged EP but likely has already left this evening, patient asymptomatic, no chest pain, no SOB, repeat EKG after run of vtach shows paced rhythm. BPs have been systolics 110s during ED stay Estuardo PGY1: Attempted to get AICD/ PPM device name/number from patient/ family/ Dr Parish's (cardiologists) office without success, paged EP but likely has already left this evening, patient asymptomatic, no chest pain, no SOB, repeat EKG after run of vtach shows paced rhythm. BPs have been systolics 110s during ED stay

## 2018-02-13 NOTE — ED ADULT NURSE NOTE - CHPI ED SYMPTOMS NEG
no pain/no chills/no decreased eating/drinking/no numbness/no weakness/no fever/no dizziness/no nausea/no vomiting

## 2018-02-13 NOTE — ED PROVIDER NOTE - PHYSICAL EXAMINATION
AAOx3, NAD  Head: NCAT  ENT: Airway patent, MMM  Cardiac: Normal rate, normal rhythm, no murmurs  Respiratory: Lungs CTA B/L  Gastrointestinal: +BS, Abdomen soft, nontender, nondistended, no rebound  MSK: No gross abnormalities, FROM of all four extremities, no edema  HEME: Extremities warm, pulses intact and symmetrical in all four extremities  Skin: No rashes, no lesions  Neuro: No gross neurologic deficits AAOx3, NAD  Head: NCAT  ENT: Airway patent, MMM, no JVD  Cardiac: Normal rate, normal rhythm, no murmurs  Respiratory: Lungs CTA B/L  Gastrointestinal: +BS, Abdomen soft, nontender, nondistended, no rebound  MSK: No gross abnormalities, FROM of all four extremities, + b/l lower extremity 1+ pitting edema to ankles  HEME: Extremities warm, pulses intact and symmetrical in all four extremities  Skin: No rashes, no lesions  Neuro: No gross neurologic deficits

## 2018-02-13 NOTE — CHART NOTE - NSCHARTNOTEFT_GEN_A_CORE
I was asked by dr cmbride to see the patient in the ER.  He sent the patient in for hypotension.  On my evaluation the patient has clear lungs and no apparent respiratory distress.  Further management per ED.

## 2018-02-13 NOTE — H&P ADULT - ASSESSMENT
The patient is a 72y Female was sent by nephrologist for low BP.    Hypotension:    DC HCTZ  Decrease Lasix/Hydralazine.    RIKKI:  Likely prerenal  IVF  BMP    DM II:  Lantus/Humalog/FSSS

## 2018-02-13 NOTE — ED PROVIDER NOTE - ATTENDING CONTRIBUTION TO CARE
pt is a 73 y/o female with h/o dm, chf ef 35%, htn presents with weakness, low bp in renal office with ckd/kierra thought to be in cardogenic shock vs dehydration. lungs cta with no sob, no evidence of fluid overload state, possibly due to three meds causing kidney injury, to give small fluid bolus, vss, chf work up and likely admission.

## 2018-02-13 NOTE — ED PROVIDER NOTE - OBJECTIVE STATEMENT
72F hx CHF, HTN, CAD, DM, c/o of weakness/ hypotension/ elevated BUN/Cr to 69/1.8 at nephrologist Dr Waters's office, with reported B lines on lung US. No recent infection, 72F hx CHF, HTN, CAD on Effient, DM, c/o of weakness/ hypotension/ elevated BUN/Cr to 69/1.8 at nephrologist Dr Waters's office, with reported B lines on lung US. No recent infection, no fevers/chills, + chest pain at rest, intermittent, not present today, worsened while supine and worsened with exertion. Report SBPs were 70s, at ED SBP 110s. Notes this has been an ongoing issue x weeks, with adjustments in BP medications and lasix, took 40mg lasix PTA 72F hx CHF, HTN, CAD on Effient, with PPM/AICD, DM, c/o of weakness/ hypotension/ elevated BUN/Cr to 69/1.8 at nephrologist Dr Waters's office, with reported B lines on lung US. No recent infection, no fevers/chills, + chest pain at rest, intermittent, not present today, worsened while supine and worsened with exertion. Report SBPs were 70s, at ED SBP 110s. Notes this has been an ongoing issue x weeks, with adjustments in BP medications and lasix. Took 40mg lasix PTA. No melena/hematochezia reported, had recent colonoscopy one week ago for constipation and has since been having loose stools 2/2 colace

## 2018-02-14 LAB
ANION GAP SERPL CALC-SCNC: 13 MMOL/L — SIGNIFICANT CHANGE UP (ref 5–17)
BUN SERPL-MCNC: 29 MG/DL — HIGH (ref 7–23)
CALCIUM SERPL-MCNC: 9.7 MG/DL — SIGNIFICANT CHANGE UP (ref 8.4–10.5)
CHLORIDE SERPL-SCNC: 101 MMOL/L — SIGNIFICANT CHANGE UP (ref 96–108)
CO2 SERPL-SCNC: 25 MMOL/L — SIGNIFICANT CHANGE UP (ref 22–31)
CREAT SERPL-MCNC: 1.3 MG/DL — SIGNIFICANT CHANGE UP (ref 0.5–1.3)
GLUCOSE BLDC GLUCOMTR-MCNC: 109 MG/DL — HIGH (ref 70–99)
GLUCOSE BLDC GLUCOMTR-MCNC: 115 MG/DL — HIGH (ref 70–99)
GLUCOSE BLDC GLUCOMTR-MCNC: 150 MG/DL — HIGH (ref 70–99)
GLUCOSE BLDC GLUCOMTR-MCNC: 189 MG/DL — HIGH (ref 70–99)
GLUCOSE BLDC GLUCOMTR-MCNC: 66 MG/DL — LOW (ref 70–99)
GLUCOSE BLDC GLUCOMTR-MCNC: 71 MG/DL — SIGNIFICANT CHANGE UP (ref 70–99)
GLUCOSE BLDC GLUCOMTR-MCNC: 92 MG/DL — SIGNIFICANT CHANGE UP (ref 70–99)
GLUCOSE SERPL-MCNC: 127 MG/DL — HIGH (ref 70–99)
HBA1C BLD-MCNC: 6.8 % — HIGH (ref 4–5.6)
HCT VFR BLD CALC: 36.4 % — SIGNIFICANT CHANGE UP (ref 34.5–45)
HGB BLD-MCNC: 12.2 G/DL — SIGNIFICANT CHANGE UP (ref 11.5–15.5)
MCHC RBC-ENTMCNC: 31.1 PG — SIGNIFICANT CHANGE UP (ref 27–34)
MCHC RBC-ENTMCNC: 33.5 GM/DL — SIGNIFICANT CHANGE UP (ref 32–36)
MCV RBC AUTO: 92.9 FL — SIGNIFICANT CHANGE UP (ref 80–100)
PLATELET # BLD AUTO: 155 K/UL — SIGNIFICANT CHANGE UP (ref 150–400)
POTASSIUM SERPL-MCNC: 4.2 MMOL/L — SIGNIFICANT CHANGE UP (ref 3.5–5.3)
POTASSIUM SERPL-SCNC: 4.2 MMOL/L — SIGNIFICANT CHANGE UP (ref 3.5–5.3)
RBC # BLD: 3.92 M/UL — SIGNIFICANT CHANGE UP (ref 3.8–5.2)
RBC # FLD: 15.1 % — HIGH (ref 10.3–14.5)
SODIUM SERPL-SCNC: 139 MMOL/L — SIGNIFICANT CHANGE UP (ref 135–145)
WBC # BLD: 6.4 K/UL — SIGNIFICANT CHANGE UP (ref 3.8–10.5)
WBC # FLD AUTO: 6.4 K/UL — SIGNIFICANT CHANGE UP (ref 3.8–10.5)

## 2018-02-14 RX ORDER — INSULIN DETEMIR 100/ML (3)
40 INSULIN PEN (ML) SUBCUTANEOUS
Qty: 0 | Refills: 0 | COMMUNITY

## 2018-02-14 RX ORDER — KETOROLAC TROMETHAMINE 0.5 %
1 DROPS OPHTHALMIC (EYE)
Qty: 0 | Refills: 0 | COMMUNITY

## 2018-02-14 RX ORDER — VALSARTAN 80 MG/1
80 TABLET ORAL DAILY
Qty: 0 | Refills: 0 | Status: DISCONTINUED | OUTPATIENT
Start: 2018-02-14 | End: 2018-02-15

## 2018-02-14 RX ADMIN — SPIRONOLACTONE 25 MILLIGRAM(S): 25 TABLET, FILM COATED ORAL at 06:00

## 2018-02-14 RX ADMIN — HEPARIN SODIUM 5000 UNIT(S): 5000 INJECTION INTRAVENOUS; SUBCUTANEOUS at 22:38

## 2018-02-14 RX ADMIN — Medication 20 MILLIGRAM(S): at 06:00

## 2018-02-14 RX ADMIN — HEPARIN SODIUM 5000 UNIT(S): 5000 INJECTION INTRAVENOUS; SUBCUTANEOUS at 06:00

## 2018-02-14 RX ADMIN — ISOSORBIDE DINITRATE 20 MILLIGRAM(S): 5 TABLET ORAL at 06:00

## 2018-02-14 RX ADMIN — Medication 1 DROP(S): at 22:38

## 2018-02-14 RX ADMIN — Medication 10 MILLIEQUIVALENT(S): at 12:14

## 2018-02-14 RX ADMIN — VALSARTAN 160 MILLIGRAM(S): 80 TABLET ORAL at 06:00

## 2018-02-14 RX ADMIN — Medication 7 UNIT(S): at 17:57

## 2018-02-14 RX ADMIN — Medication 1: at 08:52

## 2018-02-14 RX ADMIN — INSULIN GLARGINE 32 UNIT(S): 100 INJECTION, SOLUTION SUBCUTANEOUS at 22:37

## 2018-02-14 RX ADMIN — Medication 7 UNIT(S): at 12:14

## 2018-02-14 RX ADMIN — Medication 100 MILLIGRAM(S): at 17:58

## 2018-02-14 RX ADMIN — ISOSORBIDE DINITRATE 20 MILLIGRAM(S): 5 TABLET ORAL at 22:38

## 2018-02-14 RX ADMIN — Medication 81 MILLIGRAM(S): at 12:14

## 2018-02-14 RX ADMIN — Medication 7 UNIT(S): at 08:52

## 2018-02-14 RX ADMIN — HEPARIN SODIUM 5000 UNIT(S): 5000 INJECTION INTRAVENOUS; SUBCUTANEOUS at 15:31

## 2018-02-14 RX ADMIN — ATORVASTATIN CALCIUM 40 MILLIGRAM(S): 80 TABLET, FILM COATED ORAL at 22:38

## 2018-02-14 RX ADMIN — Medication 100 MILLIGRAM(S): at 06:00

## 2018-02-14 RX ADMIN — Medication 25 MILLIGRAM(S): at 06:00

## 2018-02-14 RX ADMIN — Medication 50 MILLIGRAM(S): at 06:00

## 2018-02-14 RX ADMIN — Medication 25 MILLIGRAM(S): at 22:38

## 2018-02-14 NOTE — PROGRESS NOTE ADULT - SUBJECTIVE AND OBJECTIVE BOX
Patient is a 72y old  Female who presents with a chief complaint of The patient is a 72y Female was sent by nephrologist for low BP. (13 Feb 2018 20:23)      SUBJECTIVE / OVERNIGHT EVENTS:   Feels better.  Denies CP/SOB/Palpitation/HA.    MEDICATIONS  (STANDING):  aspirin enteric coated 81 milliGRAM(s) Oral daily  atorvastatin 40 milliGRAM(s) Oral at bedtime  dextrose 5%. 1000 milliLiter(s) (50 mL/Hr) IV Continuous <Continuous>  dextrose 50% Injectable 12.5 Gram(s) IV Push once  dextrose 50% Injectable 25 Gram(s) IV Push once  dextrose 50% Injectable 25 Gram(s) IV Push once  docusate sodium 100 milliGRAM(s) Oral two times a day  furosemide    Tablet 20 milliGRAM(s) Oral daily  heparin  Injectable 5000 Unit(s) SubCutaneous every 8 hours  hydrALAZINE 25 milliGRAM(s) Oral three times a day  insulin glargine Injectable (LANTUS) 32 Unit(s) SubCutaneous at bedtime  insulin lispro (HumaLOG) corrective regimen sliding scale   SubCutaneous three times a day before meals  insulin lispro Injectable (HumaLOG) 7 Unit(s) SubCutaneous three times a day before meals  isosorbide   dinitrate Tablet (ISORDIL) 20 milliGRAM(s) Oral three times a day  ketorolac 0.5% Ophthalmic Solution 1 Drop(s) Both EYES at bedtime  metoprolol succinate ER 50 milliGRAM(s) Oral daily  potassium chloride    Tablet ER 10 milliEquivalent(s) Oral daily  spironolactone 25 milliGRAM(s) Oral daily  valsartan 80 milliGRAM(s) Oral daily    MEDICATIONS  (PRN):  dextrose Gel 1 Dose(s) Oral once PRN Blood Glucose LESS THAN 70 milliGRAM(s)/deciliter  glucagon  Injectable 1 milliGRAM(s) IntraMuscular once PRN Glucose LESS THAN 70 milligrams/deciliter        CAPILLARY BLOOD GLUCOSE      POCT Blood Glucose.: 109 mg/dL (14 Feb 2018 20:52)  POCT Blood Glucose.: 150 mg/dL (14 Feb 2018 16:39)  POCT Blood Glucose.: 92 mg/dL (14 Feb 2018 12:08)  POCT Blood Glucose.: 189 mg/dL (14 Feb 2018 07:48)    I&O's Summary    14 Feb 2018 07:01  -  14 Feb 2018 21:27  --------------------------------------------------------  IN: 1140 mL / OUT: 0 mL / NET: 1140 mL        PHYSICAL EXAM:  GENERAL: NAD, well-developed  HEAD:  Atraumatic, Normocephalic  NECK: Supple, No JVD  CHEST/LUNG: Clear to auscultation bilaterally; No wheezing.  HEART: Regular rate and rhythm; No murmurs, rubs, or gallops  ABDOMEN: Soft, Nontender, Nondistended; Bowel sounds present  EXTREMITIES:   No clubbing, cyanosis, or edema  NEUROLOGY: AAO X 3  SKIN: No rashes    LABS:                        12.2   6.40  )-----------( 155      ( 14 Feb 2018 07:30 )             36.4     02-14    139  |  101  |  29<H>  ----------------------------<  127<H>  4.2   |  25  |  1.30    Ca    9.7      14 Feb 2018 07:55    TPro  8.5<H>  /  Alb  4.1  /  TBili  0.4  /  DBili  x   /  AST  29  /  ALT  22  /  AlkPhos  50  02-13    PT/INR - ( 13 Feb 2018 16:17 )   PT: 11.5 sec;   INR: 1.06 ratio         PTT - ( 13 Feb 2018 16:17 )  PTT:28.4 sec  CARDIAC MARKERS ( 13 Feb 2018 15:34 )  x     / 0.02 ng/mL / 362 U/L / x     / 3.8 ng/mL        CAPILLARY BLOOD GLUCOSE      POCT Blood Glucose.: 109 mg/dL (14 Feb 2018 20:52)  POCT Blood Glucose.: 150 mg/dL (14 Feb 2018 16:39)  POCT Blood Glucose.: 92 mg/dL (14 Feb 2018 12:08)  POCT Blood Glucose.: 189 mg/dL (14 Feb 2018 07:48)                RADIOLOGY & ADDITIONAL TESTS:    Imaging Personally Reviewed:    Consultant(s) Notes Reviewed:      Care Discussed with Consultants/Other Providers:

## 2018-02-14 NOTE — PROGRESS NOTE ADULT - ASSESSMENT
The patient is a 72y Female was sent by nephrologist for low BP.    Hypotension:    DC HCTZ  Decrease Lasix/Hydralazine.  Nephro f/up noted.  HF eval called considering EF 20%.      RIKKI:  Likely prerenal  IVF  BMP    DM II:  Lantus/Humalog/FSSS

## 2018-02-15 LAB
ANION GAP SERPL CALC-SCNC: 14 MMOL/L — SIGNIFICANT CHANGE UP (ref 5–17)
APPEARANCE UR: CLEAR — SIGNIFICANT CHANGE UP
BILIRUB UR-MCNC: NEGATIVE — SIGNIFICANT CHANGE UP
BUN SERPL-MCNC: 30 MG/DL — HIGH (ref 7–23)
CALCIUM SERPL-MCNC: 9.3 MG/DL — SIGNIFICANT CHANGE UP (ref 8.4–10.5)
CHLORIDE SERPL-SCNC: 102 MMOL/L — SIGNIFICANT CHANGE UP (ref 96–108)
CHLORIDE UR-SCNC: <35 MMOL/L — SIGNIFICANT CHANGE UP
CO2 SERPL-SCNC: 24 MMOL/L — SIGNIFICANT CHANGE UP (ref 22–31)
COLOR SPEC: SIGNIFICANT CHANGE UP
CREAT ?TM UR-MCNC: 83 MG/DL — SIGNIFICANT CHANGE UP
CREAT SERPL-MCNC: 1.72 MG/DL — HIGH (ref 0.5–1.3)
DIFF PNL FLD: NEGATIVE — SIGNIFICANT CHANGE UP
GLUCOSE BLDC GLUCOMTR-MCNC: 100 MG/DL — HIGH (ref 70–99)
GLUCOSE BLDC GLUCOMTR-MCNC: 104 MG/DL — HIGH (ref 70–99)
GLUCOSE BLDC GLUCOMTR-MCNC: 112 MG/DL — HIGH (ref 70–99)
GLUCOSE BLDC GLUCOMTR-MCNC: 93 MG/DL — SIGNIFICANT CHANGE UP (ref 70–99)
GLUCOSE SERPL-MCNC: 103 MG/DL — HIGH (ref 70–99)
GLUCOSE UR QL: NEGATIVE — SIGNIFICANT CHANGE UP
KETONES UR-MCNC: NEGATIVE — SIGNIFICANT CHANGE UP
LEUKOCYTE ESTERASE UR-ACNC: NEGATIVE — SIGNIFICANT CHANGE UP
NITRITE UR-MCNC: NEGATIVE — SIGNIFICANT CHANGE UP
PH UR: 7 — SIGNIFICANT CHANGE UP (ref 5–8)
POTASSIUM SERPL-MCNC: 3.9 MMOL/L — SIGNIFICANT CHANGE UP (ref 3.5–5.3)
POTASSIUM SERPL-SCNC: 3.9 MMOL/L — SIGNIFICANT CHANGE UP (ref 3.5–5.3)
POTASSIUM UR-SCNC: 43 MMOL/L — SIGNIFICANT CHANGE UP
PROT UR-MCNC: NEGATIVE — SIGNIFICANT CHANGE UP
SODIUM SERPL-SCNC: 140 MMOL/L — SIGNIFICANT CHANGE UP (ref 135–145)
SODIUM UR-SCNC: 50 MMOL/L — SIGNIFICANT CHANGE UP
SP GR SPEC: 1.01 — SIGNIFICANT CHANGE UP (ref 1.01–1.02)
UROBILINOGEN FLD QL: NEGATIVE — SIGNIFICANT CHANGE UP

## 2018-02-15 PROCEDURE — 99222 1ST HOSP IP/OBS MODERATE 55: CPT | Mod: GC

## 2018-02-15 PROCEDURE — 70450 CT HEAD/BRAIN W/O DYE: CPT | Mod: 26

## 2018-02-15 RX ORDER — VALSARTAN 80 MG/1
40 TABLET ORAL DAILY
Qty: 0 | Refills: 0 | Status: DISCONTINUED | OUTPATIENT
Start: 2018-02-15 | End: 2018-02-16

## 2018-02-15 RX ADMIN — INSULIN GLARGINE 32 UNIT(S): 100 INJECTION, SOLUTION SUBCUTANEOUS at 23:00

## 2018-02-15 RX ADMIN — ISOSORBIDE DINITRATE 20 MILLIGRAM(S): 5 TABLET ORAL at 14:09

## 2018-02-15 RX ADMIN — Medication 100 MILLIGRAM(S): at 17:08

## 2018-02-15 RX ADMIN — SPIRONOLACTONE 25 MILLIGRAM(S): 25 TABLET, FILM COATED ORAL at 09:45

## 2018-02-15 RX ADMIN — Medication 1 DROP(S): at 23:11

## 2018-02-15 RX ADMIN — Medication 20 MILLIGRAM(S): at 06:46

## 2018-02-15 RX ADMIN — Medication 10 MILLIEQUIVALENT(S): at 12:03

## 2018-02-15 RX ADMIN — Medication 7 UNIT(S): at 17:08

## 2018-02-15 RX ADMIN — Medication 7 UNIT(S): at 12:02

## 2018-02-15 RX ADMIN — Medication 81 MILLIGRAM(S): at 12:03

## 2018-02-15 RX ADMIN — HEPARIN SODIUM 5000 UNIT(S): 5000 INJECTION INTRAVENOUS; SUBCUTANEOUS at 14:06

## 2018-02-15 RX ADMIN — HEPARIN SODIUM 5000 UNIT(S): 5000 INJECTION INTRAVENOUS; SUBCUTANEOUS at 23:11

## 2018-02-15 RX ADMIN — HEPARIN SODIUM 5000 UNIT(S): 5000 INJECTION INTRAVENOUS; SUBCUTANEOUS at 06:46

## 2018-02-15 RX ADMIN — ATORVASTATIN CALCIUM 40 MILLIGRAM(S): 80 TABLET, FILM COATED ORAL at 23:12

## 2018-02-15 RX ADMIN — Medication 50 MILLIGRAM(S): at 12:02

## 2018-02-15 RX ADMIN — Medication 100 MILLIGRAM(S): at 06:46

## 2018-02-15 RX ADMIN — Medication 7 UNIT(S): at 08:38

## 2018-02-15 NOTE — PROGRESS NOTE ADULT - ASSESSMENT
The patient is a 72y Female was sent by nephrologist for low BP.    Hypotension:    DC HCTZ  Decrease Lasix/Hydralazine.  Nephro f/up noted.  HF eval called considering EF 20%.      RIKKI:  Likely prerenal  s/p IVF  BMP    DM II:  Lantus/Humalog/FSSS

## 2018-02-15 NOTE — CONSULT NOTE ADULT - ASSESSMENT
A 72F hx CHF, HTN, CAD on Effient, DM, c/o of weakness at nephrologist Dr Waters's office found to be hypotensive (SBPs 70s) w/ elevated BUN/Cr to 69/1.8 and reported B lines on lung US.      Plan:  #RIKKI on CKD: Pt w/ baseline Scr ~1.2-1.4 over the past year now w/ sudden spike from 1.3 yesterday to 1.72 this AM. Likely prerenal given recent low BP and BUN to Scr ratio. SBPs ranging between  overnight. Patient w/ ~2lb weight gain since yesterday but no signs of fluid overload.  -agree with holding HCTZ and decreasing lasix to 20mg  -rec to hold ARB at this time  -check renal U/S to r/o obstruction  -check UA and urine lytes  -strict I&Os w/ daily weights   -avoid NSAIDs, ACE-i/ARB, nephrotoxic agents    Will discuss w/ attending. A 72F hx CHF, HTN, CAD on Effient, DM, c/o of weakness at nephrologist Dr Waters's office found to be hypotensive (SBPs 70s) w/ elevated BUN/Cr to 69/1.8 and reported B lines on lung US.      Plan:  #RIKKI on CKD: Pt w/ baseline Scr ~1.2-1.4 over the past year and recent Scr of 1.8 in December now p/w hypotension and uptrending Scr from admission to 1.72 this AM. Likely prerenal given extent of HF,  recent low BPs, and BUN to Scr ratio. Pt also with recent history of colace inducing loose stools although not reporting diarrhea. SBPs ranging between  overnight. Patient w/ ~2lb weight gain since yesterday but no signs of fluid overload clinically and CXR showing clear lungs.  -agree with holding HCTZ and decreasing lasix to 20mg  -rec to decrease valsartan dose to 40mg at thist gian[facility]  -check renal U/S to r/o obstruction  -check UA and urine lytes  -strict I&Os w/ daily weights   -avoid NSAIDs, ACE-i/ARB, nephrotoxic agents    Will discuss w/ attending.

## 2018-02-15 NOTE — PROGRESS NOTE ADULT - SUBJECTIVE AND OBJECTIVE BOX
Patient is a 72y old  Female who presents with a chief complaint of The patient is a 72y Female was sent by nephrologist for low BP. (2018 20:23)      SUBJECTIVE / OVERNIGHT EVENTS:   Feels better.  Denies CP/SOB/Palpitation/HA.    MEDICATIONS  (STANDING):  aspirin enteric coated 81 milliGRAM(s) Oral daily  atorvastatin 40 milliGRAM(s) Oral at bedtime  dextrose 5%. 1000 milliLiter(s) (50 mL/Hr) IV Continuous <Continuous>  dextrose 50% Injectable 12.5 Gram(s) IV Push once  dextrose 50% Injectable 25 Gram(s) IV Push once  dextrose 50% Injectable 25 Gram(s) IV Push once  docusate sodium 100 milliGRAM(s) Oral two times a day  furosemide    Tablet 20 milliGRAM(s) Oral daily  heparin  Injectable 5000 Unit(s) SubCutaneous every 8 hours  hydrALAZINE 25 milliGRAM(s) Oral three times a day  insulin glargine Injectable (LANTUS) 32 Unit(s) SubCutaneous at bedtime  insulin lispro (HumaLOG) corrective regimen sliding scale   SubCutaneous three times a day before meals  insulin lispro Injectable (HumaLOG) 7 Unit(s) SubCutaneous three times a day before meals  isosorbide   dinitrate Tablet (ISORDIL) 20 milliGRAM(s) Oral three times a day  ketorolac 0.5% Ophthalmic Solution 1 Drop(s) Both EYES at bedtime  metoprolol succinate ER 50 milliGRAM(s) Oral daily  potassium chloride    Tablet ER 10 milliEquivalent(s) Oral daily  spironolactone 25 milliGRAM(s) Oral daily  valsartan 40 milliGRAM(s) Oral daily    MEDICATIONS  (PRN):  dextrose Gel 1 Dose(s) Oral once PRN Blood Glucose LESS THAN 70 milliGRAM(s)/deciliter  glucagon  Injectable 1 milliGRAM(s) IntraMuscular once PRN Glucose LESS THAN 70 milligrams/deciliter        CAPILLARY BLOOD GLUCOSE      POCT Blood Glucose.: 112 mg/dL (15 Feb 2018 22:59)  POCT Blood Glucose.: 93 mg/dL (15 Feb 2018 16:41)  POCT Blood Glucose.: 104 mg/dL (15 Feb 2018 11:50)  POCT Blood Glucose.: 100 mg/dL (15 Feb 2018 07:59)    I&O's Summary    2018 07:01  -  15 Feb 2018 07:00  --------------------------------------------------------  IN: 1140 mL / OUT: 0 mL / NET: 1140 mL    15 Feb 2018 07:01  -  2018 00:12  --------------------------------------------------------  IN: 1040 mL / OUT: 300 mL / NET: 740 mL        PHYSICAL EXAM:  GENERAL: NAD, well-developed  HEAD:  Atraumatic, Normocephalic  NECK: Supple, No JVD  CHEST/LUNG: Clear to auscultation bilaterally; No wheezing.  HEART: Regular rate and rhythm; No murmurs, rubs, or gallops  ABDOMEN: Soft, Nontender, Nondistended; Bowel sounds present  EXTREMITIES:   No clubbing, cyanosis, or edema  NEUROLOGY: AAO X 3  SKIN: No rashes    LABS:                        12.2   6.40  )-----------( 155      ( 2018 07:30 )             36.4     02-15    140  |  102  |  30<H>  ----------------------------<  103<H>  3.9   |  24  |  1.72<H>    Ca    9.3      15 Feb 2018 07:36            Urinalysis Basic - ( 15 Feb 2018 22:55 )    Color: PL Yellow / Appearance: Clear / S.013 / pH: x  Gluc: x / Ketone: Negative  / Bili: Negative / Urobili: Negative   Blood: x / Protein: Negative / Nitrite: Negative   Leuk Esterase: Negative / RBC: x / WBC x   Sq Epi: x / Non Sq Epi: x / Bacteria: x      CAPILLARY BLOOD GLUCOSE      POCT Blood Glucose.: 112 mg/dL (15 Feb 2018 22:59)  POCT Blood Glucose.: 93 mg/dL (15 Feb 2018 16:41)  POCT Blood Glucose.: 104 mg/dL (15 Feb 2018 11:50)  POCT Blood Glucose.: 100 mg/dL (15 Feb 2018 07:59)                RADIOLOGY & ADDITIONAL TESTS:    Imaging Personally Reviewed:    Consultant(s) Notes Reviewed:      Care Discussed with Consultants/Other Providers:

## 2018-02-15 NOTE — CONSULT NOTE ADULT - SUBJECTIVE AND OBJECTIVE BOX
[facility] DIVISION OF KIDNEY DISEASES AND HYPERTENSION -- INITIAL CONSULT NOTE  --------------------------------------------------------------------------------  HPI: A 72F hx CHF, HTN, CAD on Effient, DM, c/o of weakness at nephrologist Dr Waters's office found to be hypotensive (SBPs 70s) w/ elevated BUN/Cr to 69/1.8 and reported B lines on lung US so referred to our ED. On arrival to ED, VS: T 99, HR 85, /73, RR 26, SpO2 98% on RA. Reported intermittent CP at rest that is worsened w/ exertion and while supine but not present since admission. Notes this has been an ongoing issue x weeks with adjustments in BP medications and lasix. Took 40mg lasix PTA.     Patient w/ SBPs  overnight. Sudden spike in Scr to 1.7 this AM from 1.3 yesterday (baseline 1.2-1.4 over past year). HCTZ dc/d, hydralazine and lasix doses decreased.         PAST HISTORY  --------------------------------------------------------------------------------  PAST MEDICAL & SURGICAL HISTORY:  Diabetes  Hypertension  HLD (hyperlipidemia)  Afib  DM (diabetes mellitus)  HTN (hypertension)  Chronic congestive heart failure, unspecified congestive heart failure type: s/p AICD  HLD (hyperlipidemia)  HTN (hypertension)  DM (diabetes mellitus)  CAD (coronary artery disease): s/p stent of mLAD, rPDA, rPLS  AICD (automatic cardioverter/defibrillator) present  Pacemaker  History of heart artery stent  AICD (automatic cardioverter/defibrillator) present    FAMILY HISTORY:  No pertinent family history in first degree relatives    PAST SOCIAL HISTORY:    ALLERGIES & MEDICATIONS  --------------------------------------------------------------------------------  Allergies    No Known Allergies    Intolerances      Standing Inpatient Medications  aspirin enteric coated 81 milliGRAM(s) Oral daily  atorvastatin 40 milliGRAM(s) Oral at bedtime  dextrose 5%. 1000 milliLiter(s) IV Continuous <Continuous>  dextrose 50% Injectable 12.5 Gram(s) IV Push once  dextrose 50% Injectable 25 Gram(s) IV Push once  dextrose 50% Injectable 25 Gram(s) IV Push once  docusate sodium 100 milliGRAM(s) Oral two times a day  furosemide    Tablet 20 milliGRAM(s) Oral daily  heparin  Injectable 5000 Unit(s) SubCutaneous every 8 hours  hydrALAZINE 25 milliGRAM(s) Oral three times a day  insulin glargine Injectable (LANTUS) 32 Unit(s) SubCutaneous at bedtime  insulin lispro (HumaLOG) corrective regimen sliding scale   SubCutaneous three times a day before meals  insulin lispro Injectable (HumaLOG) 7 Unit(s) SubCutaneous three times a day before meals  isosorbide   dinitrate Tablet (ISORDIL) 20 milliGRAM(s) Oral three times a day  ketorolac 0.5% Ophthalmic Solution 1 Drop(s) Both EYES at bedtime  metoprolol succinate ER 50 milliGRAM(s) Oral daily  potassium chloride    Tablet ER 10 milliEquivalent(s) Oral daily  spironolactone 25 milliGRAM(s) Oral daily  valsartan 80 milliGRAM(s) Oral daily    PRN Inpatient Medications  dextrose Gel 1 Dose(s) Oral once PRN  glucagon  Injectable 1 milliGRAM(s) IntraMuscular once PRN      REVIEW OF SYSTEMS  --------------------------------------------------------------------------------  Gen: No weight changes, fatigue, fevers/chills, weakness  Skin: No rashes  Head/Eyes/Ears/Mouth: No headache; Normal hearing; Normal vision w/o blurriness; No sinus pain/discomfort, sore throat  Respiratory: No dyspnea, cough, wheezing, hemoptysis  CV: No chest pain, PND, orthopnea  GI: No abdominal pain, diarrhea, constipation, nausea, vomiting, melena, hematochezia  : No increased frequency, dysuria, hematuria, nocturia  MSK: No joint pain/swelling; no back pain; no edema  Neuro: No dizziness/lightheadedness, weakness, seizures, numbness, tingling  Heme: No easy bruising or bleeding  Endo: No heat/cold intolerance  Psych: No significant nervousness, anxiety, stress, depression    All other systems were reviewed and are negative, except as noted.    VITALS/PHYSICAL EXAM  --------------------------------------------------------------------------------  T(C): 36.7 (02-15-18 @ 04:18), Max: 36.9 (02-14-18 @ 20:54)  HR: 64 (02-15-18 @ 07:53) (62 - 76)  BP: 104/68 (02-15-18 @ 07:53) (78/50 - 110/70)  RR: 18 (02-15-18 @ 04:18) (18 - 18)  SpO2: 98% (02-15-18 @ 04:18) (97% - 99%)  Wt(kg): --  Height (cm): 162.56 (02-13-18 @ 20:05)  Weight (kg): 77.9 (02-13-18 @ 20:05)  BMI (kg/m2): 29.5 (02-13-18 @ 20:05)  BSA (m2): 1.83 (02-13-18 @ 20:05)      02-14-18 @ 07:01  -  02-15-18 @ 07:00  --------------------------------------------------------  IN: 1140 mL / OUT: 0 mL / NET: 1140 mL      Physical Exam:  	Gen: NAD, well-appearing  	HEENT: EOMI, supple neck, clear oropharynx  	Pulm: CTA B/L  	CV: RRR, S1S2; no rub  	Back: No spinal or CVA tenderness; no sacral edema  	Abd: +BS, soft, nontender/nondistended  	: No suprapubic tenderness  	UE: Warm, FROM, no clubbing, intact strength; no edema; no asterixis  	LE: Warm, FROM, no clubbing, intact strength; no edema  	Neuro: No focal deficits, intact gait  	Psych: Normal affect and mood  	Skin: Warm, without rashes  	Vascular access:    LABS/STUDIES  --------------------------------------------------------------------------------              12.2   6.40  >-----------<  155      [02-14-18 @ 07:30]              36.4     140  |  102  |  30  ----------------------------<  103      [02-15-18 @ 07:36]  3.9   |  24  |  1.72        Ca     9.3     [02-15-18 @ 07:36]    TPro  8.5  /  Alb  4.1  /  TBili  0.4  /  DBili  x   /  AST  29  /  ALT  22  /  AlkPhos  50  [02-13-18 @ 15:34]    PT/INR: PT 11.5 , INR 1.06       [02-13-18 @ 16:17]  PTT: 28.4       [02-13-18 @ 16:17]    Troponin 0.02      [02-13-18 @ 15:34]        [02-13-18 @ 15:34]    Creatinine Trend:  SCr 1.72 [02-15 @ 07:36]  SCr 1.30 [02-14 @ 07:55]  SCr 1.66 [02-13 @ 15:34]    Urinalysis - [07-11-16 @ 04:41]      Color COLORL / Appearance CLEAR / SG 1.026 / pH 6.0      Gluc >1000 / Ketone NEGATIVE  / Bili NEGATIVE / Urobili NORMAL       Blood NEGATIVE / Protein NEGATIVE / Leuk Est NEGATIVE / Nitrite NEGATIVE      RBC 0-2 / WBC 0-2 / Hyaline  / Gran  / Sq Epi  / Non Sq Epi  / Bacteria       HbA1c 6.8      [02-14-18 @ 07:30] [facility] DIVISION OF KIDNEY DISEASES AND HYPERTENSION -- INITIAL CONSULT NOTE  --------------------------------------------------------------------------------  HPI: A 72F hx CHF, HTN, CAD on Effient, DM, c/o of weakness at nephrologist Dr Waters's office found to be hypotensive (SBPs 70s) w/ elevated BUN/Cr to 69/1.8 and reported B lines on lung US so referred to our ED. On arrival to ED, VS: T 99, HR 85, /73, RR 26, SpO2 98% on RA. Reported intermittent CP at rest that is worsened w/ exertion and while supine but not present since admission.     Patient w/ SBPs  overnight. Sudden spike in Scr to 1.7 this AM from 1.3 yesterday (baseline 1.2-1.4 over past year). HCTZ dc/d, hydralazine and lasix doses decreased.     Additional hx obtained from daughter and [employer]:          PAST HISTORY  --------------------------------------------------------------------------------  PAST MEDICAL & SURGICAL HISTORY:  Diabetes  Hypertension  HLD (hyperlipidemia)  Afib  DM (diabetes mellitus)  HTN (hypertension)  Chronic congestive heart failure, unspecified congestive heart failure type: s/p AICD  HLD (hyperlipidemia)  HTN (hypertension)  DM (diabetes mellitus)  CAD (coronary artery disease): s/p stent of mLAD, rPDA, rPLS  AICD (automatic cardioverter/defibrillator) present  Pacemaker  History of heart artery stent  AICD (automatic cardioverter/defibrillator) present    FAMILY HISTORY:  No pertinent family history in first degree relatives    PAST SOCIAL HISTORY:    ALLERGIES & MEDICATIONS  --------------------------------------------------------------------------------  Allergies    No Known Allergies    Intolerances      Standing Inpatient Medications  aspirin enteric coated 81 milliGRAM(s) Oral daily  atorvastatin 40 milliGRAM(s) Oral at bedtime  dextrose 5%. 1000 milliLiter(s) IV Continuous <Continuous>  dextrose 50% Injectable 12.5 Gram(s) IV Push once  dextrose 50% Injectable 25 Gram(s) IV Push once  dextrose 50% Injectable 25 Gram(s) IV Push once  docusate sodium 100 milliGRAM(s) Oral two times a day  furosemide    Tablet 20 milliGRAM(s) Oral daily  heparin  Injectable 5000 Unit(s) SubCutaneous every 8 hours  hydrALAZINE 25 milliGRAM(s) Oral three times a day  insulin glargine Injectable (LANTUS) 32 Unit(s) SubCutaneous at bedtime  insulin lispro (HumaLOG) corrective regimen sliding scale   SubCutaneous three times a day before meals  insulin lispro Injectable (HumaLOG) 7 Unit(s) SubCutaneous three times a day before meals  isosorbide   dinitrate Tablet (ISORDIL) 20 milliGRAM(s) Oral three times a day  ketorolac 0.5% Ophthalmic Solution 1 Drop(s) Both EYES at bedtime  metoprolol succinate ER 50 milliGRAM(s) Oral daily  potassium chloride    Tablet ER 10 milliEquivalent(s) Oral daily  spironolactone 25 milliGRAM(s) Oral daily  valsartan 80 milliGRAM(s) Oral daily    PRN Inpatient Medications  dextrose Gel 1 Dose(s) Oral once PRN  glucagon  Injectable 1 milliGRAM(s) IntraMuscular once PRN      REVIEW OF SYSTEMS  --------------------------------------------------------------------------------  Gen: No weight changes, fatigue, fevers/chills, weakness  Skin: No rashes  Head/Eyes/Ears/Mouth: No headache; Normal hearing; Normal vision w/o blurriness; No sinus pain/discomfort, sore throat  Respiratory: No dyspnea, cough, wheezing, hemoptysis  CV: No chest pain, PND, orthopnea  GI: No abdominal pain, diarrhea, constipation, nausea, vomiting, melena, hematochezia  : No increased frequency, dysuria, hematuria, nocturia  MSK: No joint pain/swelling; no back pain; no edema  Neuro: No dizziness/lightheadedness, weakness, seizures, numbness, tingling  Heme: No easy bruising or bleeding  Endo: No heat/cold intolerance  Psych: No significant nervousness, anxiety, stress, depression    All other systems were reviewed and are negative, except as noted.    VITALS/PHYSICAL EXAM  --------------------------------------------------------------------------------  T(C): 36.7 (02-15-18 @ 04:18), Max: 36.9 (02-14-18 @ 20:54)  HR: 64 (02-15-18 @ 07:53) (62 - 76)  BP: 104/68 (02-15-18 @ 07:53) (78/50 - 110/70)  RR: 18 (02-15-18 @ 04:18) (18 - 18)  SpO2: 98% (02-15-18 @ 04:18) (97% - 99%)  Wt(kg): --  Height (cm): 162.56 (02-13-18 @ 20:05)  Weight (kg): 77.9 (02-13-18 @ 20:05)  BMI (kg/m2): 29.5 (02-13-18 @ 20:05)  BSA (m2): 1.83 (02-13-18 @ 20:05)      02-14-18 @ 07:01  -  02-15-18 @ 07:00  --------------------------------------------------------  IN: 1140 mL / OUT: 0 mL / NET: 1140 mL      Physical Exam:  	Gen: NAD, well-appearing  	HEENT: EOMI, supple neck, clear oropharynx  	Pulm: CTA B/L  	CV: RRR, S1S2; no rub  	Back: No spinal or CVA tenderness; no sacral edema  	Abd: +BS, soft, nontender/nondistended  	: No suprapubic tenderness  	UE: Warm, FROM, no clubbing, intact strength; no edema; no asterixis  	LE: Warm, FROM, no clubbing, intact strength; no edema  	Neuro: No focal deficits, intact gait  	Psych: Normal affect and mood  	Skin: Warm, without rashes  	Vascular access:    LABS/STUDIES  --------------------------------------------------------------------------------              12.2   6.40  >-----------<  155      [02-14-18 @ 07:30]              36.4     140  |  102  |  30  ----------------------------<  103      [02-15-18 @ 07:36]  3.9   |  24  |  1.72        Ca     9.3     [02-15-18 @ 07:36]    TPro  8.5  /  Alb  4.1  /  TBili  0.4  /  DBili  x   /  AST  29  /  ALT  22  /  AlkPhos  50  [02-13-18 @ 15:34]    PT/INR: PT 11.5 , INR 1.06       [02-13-18 @ 16:17]  PTT: 28.4       [02-13-18 @ 16:17]    Troponin 0.02      [02-13-18 @ 15:34]        [02-13-18 @ 15:34]    Creatinine Trend:  SCr 1.72 [02-15 @ 07:36]  SCr 1.30 [02-14 @ 07:55]  SCr 1.66 [02-13 @ 15:34]    Urinalysis - [07-11-16 @ 04:41]      Color COLORL / Appearance CLEAR / SG 1.026 / pH 6.0      Gluc >1000 / Ketone NEGATIVE  / Bili NEGATIVE / Urobili NORMAL       Blood NEGATIVE / Protein NEGATIVE / Leuk Est NEGATIVE / Nitrite NEGATIVE      RBC 0-2 / WBC 0-2 / Hyaline  / Gran  / Sq Epi  / Non Sq Epi  / Bacteria       HbA1c 6.8      [02-14-18 @ 07:30] [facility] DIVISION OF KIDNEY DISEASES AND HYPERTENSION -- INITIAL CONSULT NOTE  --------------------------------------------------------------------------------  HPI: A 72F hx CHF (last TTE showing EF 25%), HTN, AICD, CAD on Effient, DM (on insulin), c/o of weakness at nephrologist Dr Waters's office found to be hypotensive (SBPs 70s) so referred to our ED. Pt recently had labs done by PMD in 12/17 that showed elevated BUN/Cr to 69/1.8 (baseline Scr 1.2-1.4 although as high as 1.9 in 8/2016 in s/o glu in 900s and A1C of 13; Scr normalized to 1.2 after tx) last year so referred to nephrologist for further w/u which she saw on day of admission before being sent here. Reports a chronic productive cough w/ clear sputum and 3 pillow orthopnea at home.  Also, reports intermittent CP at rest that is worsened w/ exertion and while supine w/ 1 brief episode last night while lying in bed that lasted a few minutes. Of note, recent colonoscopy 1 week ago in s/o severe constipation but loose stools since started on colace. Also reports recent onset of vertigo-like symptoms over the past month. Denies recent illness, fevers, chills, sick contacts, changes in diet/medications, recent travel, n/v/c. Endorses extensive leg swelling 1 month ago which has since resolved.    On arrival to ED, VS: T 99, HR 85, /73, RR 26, SpO2 98% on RA.   Patient's antihypertensive and HF meds at home include: lasix 40 qd, hydralazine 50mg TID, HCTZ 12.5mg qd, valsartan 160qd, metoprolol succinate 50mg qd and spironolactone 25mg qd.     On the floors, home HCTZ stopped and lasix and hydralazine doses decreased. Pt started on D5W.    Patient w/ SBPs  overnight. Sudden spike in Scr to 1.7 this AM from 1.3 yesterday - although likely error as patient admitted with Scr of 1.66.              PAST HISTORY  --------------------------------------------------------------------------------  PAST MEDICAL & SURGICAL HISTORY:  Diabetes  Hypertension  HLD (hyperlipidemia)  Afib  DM (diabetes mellitus)  HTN (hypertension)  Chronic congestive heart failure, unspecified congestive heart failure type: s/p AICD  HLD (hyperlipidemia)  HTN (hypertension)  DM (diabetes mellitus)  CAD (coronary artery disease): s/p stent of mLAD, rPDA, rPLS  AICD (automatic cardioverter/defibrillator) present  Pacemaker  History of heart artery stent  AICD (automatic cardioverter/defibrillator) present    FAMILY HISTORY:  No pertinent family history in first degree relatives    PAST SOCIAL HISTORY:    ALLERGIES & MEDICATIONS  --------------------------------------------------------------------------------  Allergies    No Known Allergies    Intolerances      Standing Inpatient Medications  aspirin enteric coated 81 milliGRAM(s) Oral daily  atorvastatin 40 milliGRAM(s) Oral at bedtime  dextrose 5%. 1000 milliLiter(s) IV Continuous <Continuous>  dextrose 50% Injectable 12.5 Gram(s) IV Push once  dextrose 50% Injectable 25 Gram(s) IV Push once  dextrose 50% Injectable 25 Gram(s) IV Push once  docusate sodium 100 milliGRAM(s) Oral two times a day  furosemide    Tablet 20 milliGRAM(s) Oral daily  heparin  Injectable 5000 Unit(s) SubCutaneous every 8 hours  hydrALAZINE 25 milliGRAM(s) Oral three times a day  insulin glargine Injectable (LANTUS) 32 Unit(s) SubCutaneous at bedtime  insulin lispro (HumaLOG) corrective regimen sliding scale   SubCutaneous three times a day before meals  insulin lispro Injectable (HumaLOG) 7 Unit(s) SubCutaneous three times a day before meals  isosorbide   dinitrate Tablet (ISORDIL) 20 milliGRAM(s) Oral three times a day  ketorolac 0.5% Ophthalmic Solution 1 Drop(s) Both EYES at bedtime  metoprolol succinate ER 50 milliGRAM(s) Oral daily  potassium chloride    Tablet ER 10 milliEquivalent(s) Oral daily  spironolactone 25 milliGRAM(s) Oral daily  valsartan 80 milliGRAM(s) Oral daily    PRN Inpatient Medications  dextrose Gel 1 Dose(s) Oral once PRN  glucagon  Injectable 1 milliGRAM(s) IntraMuscular once PRN      REVIEW OF SYSTEMS  --------------------------------------------------------------------------------  Gen: No weight changes, fatigue, fevers/chills, weakness  Skin: No rashes  Head/Eyes/Ears/Mouth: No headache; Normal hearing; Normal vision w/o blurriness; No sinus pain/discomfort, sore throat  Respiratory: No dyspnea, cough, wheezing, hemoptysis  CV: No chest pain, PND, orthopnea  GI: No abdominal pain, diarrhea, constipation, nausea, vomiting, melena, hematochezia  : No increased frequency, dysuria, hematuria, nocturia  MSK: No joint pain/swelling; no back pain; no edema  Neuro: No dizziness/lightheadedness, weakness, seizures, numbness, tingling  Heme: No easy bruising or bleeding  Endo: No heat/cold intolerance  Psych: No significant nervousness, anxiety, stress, depression    All other systems were reviewed and are negative, except as noted.    VITALS/PHYSICAL EXAM  --------------------------------------------------------------------------------  T(C): 36.7 (02-15-18 @ 04:18), Max: 36.9 (02-14-18 @ 20:54)  HR: 64 (02-15-18 @ 07:53) (62 - 76)  BP: 104/68 (02-15-18 @ 07:53) (78/50 - 110/70)  RR: 18 (02-15-18 @ 04:18) (18 - 18)  SpO2: 98% (02-15-18 @ 04:18) (97% - 99%)  Wt(kg): --  Height (cm): 162.56 (02-13-18 @ 20:05)  Weight (kg): 77.9 (02-13-18 @ 20:05)  BMI (kg/m2): 29.5 (02-13-18 @ 20:05)  BSA (m2): 1.83 (02-13-18 @ 20:05)      02-14-18 @ 07:01  -  02-15-18 @ 07:00  --------------------------------------------------------  IN: 1140 mL / OUT: 0 mL / NET: 1140 mL      Physical Exam:  	Gen: NAD, well-appearing  	HEENT: EOMI, supple neck, clear oropharynx  	Pulm: CTA B/L  	CV: RRR, S1S2; no rub  	Back: No spinal or CVA tenderness; no sacral edema  	Abd: +BS, soft, nontender/nondistended  	: No suprapubic tenderness  	UE: Warm, FROM, no clubbing, intact strength; no edema; no asterixis  	LE: Warm, FROM, no clubbing, intact strength; no edema  	Neuro: No focal deficits, intact gait  	Psych: Normal affect and mood  	Skin: Warm, without rashes  	Vascular access:    LABS/STUDIES  --------------------------------------------------------------------------------              12.2   6.40  >-----------<  155      [02-14-18 @ 07:30]              36.4     140  |  102  |  30  ----------------------------<  103      [02-15-18 @ 07:36]  3.9   |  24  |  1.72        Ca     9.3     [02-15-18 @ 07:36]    TPro  8.5  /  Alb  4.1  /  TBili  0.4  /  DBili  x   /  AST  29  /  ALT  22  /  AlkPhos  50  [02-13-18 @ 15:34]    PT/INR: PT 11.5 , INR 1.06       [02-13-18 @ 16:17]  PTT: 28.4       [02-13-18 @ 16:17]    Troponin 0.02      [02-13-18 @ 15:34]        [02-13-18 @ 15:34]    Creatinine Trend:  SCr 1.72 [02-15 @ 07:36]  SCr 1.30 [02-14 @ 07:55]  SCr 1.66 [02-13 @ 15:34]    Urinalysis - [07-11-16 @ 04:41]      Color COLORL / Appearance CLEAR / SG 1.026 / pH 6.0      Gluc >1000 / Ketone NEGATIVE  / Bili NEGATIVE / Urobili NORMAL       Blood NEGATIVE / Protein NEGATIVE / Leuk Est NEGATIVE / Nitrite NEGATIVE      RBC 0-2 / WBC 0-2 / Hyaline  / Gran  / Sq Epi  / Non Sq Epi  / Bacteria       HbA1c 6.8      [02-14-18 @ 07:30]

## 2018-02-16 ENCOUNTER — TRANSCRIPTION ENCOUNTER (OUTPATIENT)
Age: 73
End: 2018-02-16

## 2018-02-16 DIAGNOSIS — I50.22 CHRONIC SYSTOLIC (CONGESTIVE) HEART FAILURE: ICD-10-CM

## 2018-02-16 DIAGNOSIS — I50.9 HEART FAILURE, UNSPECIFIED: ICD-10-CM

## 2018-02-16 DIAGNOSIS — E11.9 TYPE 2 DIABETES MELLITUS WITHOUT COMPLICATIONS: ICD-10-CM

## 2018-02-16 DIAGNOSIS — N17.9 ACUTE KIDNEY FAILURE, UNSPECIFIED: ICD-10-CM

## 2018-02-16 DIAGNOSIS — N18.9 CHRONIC KIDNEY DISEASE, UNSPECIFIED: ICD-10-CM

## 2018-02-16 LAB
ANION GAP SERPL CALC-SCNC: 13 MMOL/L — SIGNIFICANT CHANGE UP (ref 5–17)
BUN SERPL-MCNC: 27 MG/DL — HIGH (ref 7–23)
CALCIUM SERPL-MCNC: 10 MG/DL — SIGNIFICANT CHANGE UP (ref 8.4–10.5)
CHLORIDE SERPL-SCNC: 102 MMOL/L — SIGNIFICANT CHANGE UP (ref 96–108)
CO2 SERPL-SCNC: 22 MMOL/L — SIGNIFICANT CHANGE UP (ref 22–31)
CREAT SERPL-MCNC: 1.42 MG/DL — HIGH (ref 0.5–1.3)
GLUCOSE BLDC GLUCOMTR-MCNC: 134 MG/DL — HIGH (ref 70–99)
GLUCOSE BLDC GLUCOMTR-MCNC: 157 MG/DL — HIGH (ref 70–99)
GLUCOSE BLDC GLUCOMTR-MCNC: 165 MG/DL — HIGH (ref 70–99)
GLUCOSE BLDC GLUCOMTR-MCNC: 81 MG/DL — SIGNIFICANT CHANGE UP (ref 70–99)
GLUCOSE BLDC GLUCOMTR-MCNC: 99 MG/DL — SIGNIFICANT CHANGE UP (ref 70–99)
GLUCOSE SERPL-MCNC: 89 MG/DL — SIGNIFICANT CHANGE UP (ref 70–99)
HCT VFR BLD CALC: 35.1 % — SIGNIFICANT CHANGE UP (ref 34.5–45)
HGB BLD-MCNC: 11.6 G/DL — SIGNIFICANT CHANGE UP (ref 11.5–15.5)
MCHC RBC-ENTMCNC: 30.9 PG — SIGNIFICANT CHANGE UP (ref 27–34)
MCHC RBC-ENTMCNC: 33 GM/DL — SIGNIFICANT CHANGE UP (ref 32–36)
MCV RBC AUTO: 93.4 FL — SIGNIFICANT CHANGE UP (ref 80–100)
PLATELET # BLD AUTO: 173 K/UL — SIGNIFICANT CHANGE UP (ref 150–400)
POTASSIUM SERPL-MCNC: 4.2 MMOL/L — SIGNIFICANT CHANGE UP (ref 3.5–5.3)
POTASSIUM SERPL-SCNC: 4.2 MMOL/L — SIGNIFICANT CHANGE UP (ref 3.5–5.3)
RBC # BLD: 3.76 M/UL — LOW (ref 3.8–5.2)
RBC # FLD: 14.6 % — HIGH (ref 10.3–14.5)
SODIUM SERPL-SCNC: 137 MMOL/L — SIGNIFICANT CHANGE UP (ref 135–145)
WBC # BLD: 7.21 K/UL — SIGNIFICANT CHANGE UP (ref 3.8–10.5)
WBC # FLD AUTO: 7.21 K/UL — SIGNIFICANT CHANGE UP (ref 3.8–10.5)

## 2018-02-16 PROCEDURE — 99223 1ST HOSP IP/OBS HIGH 75: CPT

## 2018-02-16 PROCEDURE — 99232 SBSQ HOSP IP/OBS MODERATE 35: CPT | Mod: GC

## 2018-02-16 RX ORDER — ISOSORBIDE DINITRATE 5 MG/1
20 TABLET ORAL THREE TIMES A DAY
Qty: 0 | Refills: 0 | Status: DISCONTINUED | OUTPATIENT
Start: 2018-02-16 | End: 2018-02-18

## 2018-02-16 RX ORDER — VALSARTAN 80 MG/1
40 TABLET ORAL
Qty: 0 | Refills: 0 | Status: DISCONTINUED | OUTPATIENT
Start: 2018-02-16 | End: 2018-02-18

## 2018-02-16 RX ADMIN — SPIRONOLACTONE 25 MILLIGRAM(S): 25 TABLET, FILM COATED ORAL at 13:05

## 2018-02-16 RX ADMIN — Medication 1 DROP(S): at 21:37

## 2018-02-16 RX ADMIN — Medication 100 MILLIGRAM(S): at 06:58

## 2018-02-16 RX ADMIN — Medication 20 MILLIGRAM(S): at 13:05

## 2018-02-16 RX ADMIN — Medication 100 MILLIGRAM(S): at 17:25

## 2018-02-16 RX ADMIN — HEPARIN SODIUM 5000 UNIT(S): 5000 INJECTION INTRAVENOUS; SUBCUTANEOUS at 21:38

## 2018-02-16 RX ADMIN — HEPARIN SODIUM 5000 UNIT(S): 5000 INJECTION INTRAVENOUS; SUBCUTANEOUS at 13:06

## 2018-02-16 RX ADMIN — ATORVASTATIN CALCIUM 40 MILLIGRAM(S): 80 TABLET, FILM COATED ORAL at 21:38

## 2018-02-16 RX ADMIN — ISOSORBIDE DINITRATE 20 MILLIGRAM(S): 5 TABLET ORAL at 06:58

## 2018-02-16 RX ADMIN — INSULIN GLARGINE 32 UNIT(S): 100 INJECTION, SOLUTION SUBCUTANEOUS at 21:37

## 2018-02-16 RX ADMIN — Medication 25 MILLIGRAM(S): at 21:38

## 2018-02-16 RX ADMIN — Medication 7 UNIT(S): at 17:25

## 2018-02-16 RX ADMIN — ISOSORBIDE DINITRATE 20 MILLIGRAM(S): 5 TABLET ORAL at 21:38

## 2018-02-16 RX ADMIN — Medication 7 UNIT(S): at 08:35

## 2018-02-16 RX ADMIN — Medication 10 MILLIEQUIVALENT(S): at 13:04

## 2018-02-16 RX ADMIN — VALSARTAN 40 MILLIGRAM(S): 80 TABLET ORAL at 17:25

## 2018-02-16 RX ADMIN — ISOSORBIDE DINITRATE 20 MILLIGRAM(S): 5 TABLET ORAL at 13:05

## 2018-02-16 RX ADMIN — Medication 7 UNIT(S): at 13:04

## 2018-02-16 RX ADMIN — Medication 81 MILLIGRAM(S): at 13:05

## 2018-02-16 RX ADMIN — Medication 50 MILLIGRAM(S): at 13:06

## 2018-02-16 RX ADMIN — HEPARIN SODIUM 5000 UNIT(S): 5000 INJECTION INTRAVENOUS; SUBCUTANEOUS at 06:58

## 2018-02-16 NOTE — DISCHARGE NOTE ADULT - CARE PLAN
Principal Discharge DX:	Hypotension, unspecified hypotension type  Goal:	resolution  Assessment and plan of treatment:	Take medication as directed  Follow-up with your cardiologist  Secondary Diagnosis:	Chronic congestive heart failure, unspecified congestive heart failure type  Assessment and plan of treatment:	Weigh yourself daily.  If you gain 3lbs in 3 days, or 5lbs in a week call your Health Care Provider.  Do not eat or drink foods containing more than 2000mg of salt (sodium) in your diet every day.  Call your Health Care Provider if you have any swelling or increased swelling in your feet, ankles, and/or stomach.  Take all of your medication as directed.  If you become dizzy call your Health Care Provider.  Secondary Diagnosis:	RIKKI (acute kidney injury)  Assessment and plan of treatment:	Avoid Nephrotoxic medication  Follow-up with your Nephrologist  Secondary Diagnosis:	DM (diabetes mellitus)  Assessment and plan of treatment:	HgA1C this admission.  Make sure you get your HgA1c checked every three months.  If you take oral diabetes medications, check your blood glucose two times a day.  If you take insulin, check your blood glucose before meals and at bedtime.  It's important not to skip any meals.  Keep a log of your blood glucose results and always take it with you to your doctor appointments.  Keep a list of your current medications including injectables and over the counter medications and bring this medication list with you to all your doctor appointments.  If you have not seen your ophthalmologist this year call for appointment.  Check your feet daily for redness, sores, or openings. Do not self treat. If no improvement in two days call your primary care physician for an appointment.  Low blood sugar (hypoglycemia) is a blood sugar below 70mg/dl. Check your blood sugar if you feel signs/symptoms of hypoglycemia. If your blood sugar is below 70 take 15 grams of carbohydrates (ex 4 oz of apple juice, 3-4 glucose tablets, or 4-6 oz of regular soda) wait 15 minutes and repeat blood sugar to make sure it comes up above 70.  If your blood sugar is above 70 and you are due for a meal, have a meal.  If you are not due for a meal have a snack.  This snack helps keeps your blood sugar at a safe range. Principal Discharge DX:	Hypotension, unspecified hypotension type  Goal:	resolution  Assessment and plan of treatment:	Take medication as directed  Follow-up with your cardiologist  Secondary Diagnosis:	Chronic congestive heart failure, unspecified congestive heart failure type  Goal:	symptom management  Assessment and plan of treatment:	Weigh yourself daily.  If you gain 3lbs in 3 days, or 5lbs in a week call your Health Care Provider.  Do not eat or drink foods containing more than 2000mg of salt (sodium) in your diet every day.  Call your Health Care Provider if you have any swelling or increased swelling in your feet, ankles, and/or stomach.  Take all of your medication as directed.  If you become dizzy call your Health Care Provider.  Secondary Diagnosis:	RIKKI (acute kidney injury)  Goal:	disease management  Assessment and plan of treatment:	Avoid Nephrotoxic medication  Follow-up with your Nephrologist  Secondary Diagnosis:	DM (diabetes mellitus)  Assessment and plan of treatment:	HgA1C this admission 6.8  Make sure you get your HgA1c checked every three months.  If you take oral diabetes medications, check your blood glucose two times a day.  If you take insulin, check your blood glucose before meals and at bedtime.  It's important not to skip any meals.  Keep a log of your blood glucose results and always take it with you to your doctor appointments.  Keep a list of your current medications including injectables and over the counter medications and bring this medication list with you to all your doctor appointments.  If you have not seen your ophthalmologist this year call for appointment.  Check your feet daily for redness, sores, or openings. Do not self treat. If no improvement in two days call your primary care physician for an appointment.  Low blood sugar (hypoglycemia) is a blood sugar below 70mg/dl. Check your blood sugar if you feel signs/symptoms of hypoglycemia. If your blood sugar is below 70 take 15 grams of carbohydrates (ex 4 oz of apple juice, 3-4 glucose tablets, or 4-6 oz of regular soda) wait 15 minutes and repeat blood sugar to make sure it comes up above 70.  If your blood sugar is above 70 and you are due for a meal, have a meal.  If you are not due for a meal have a snack.  This snack helps keeps your blood sugar at a safe range.

## 2018-02-16 NOTE — PROGRESS NOTE ADULT - ATTENDING COMMENTS
CKD  CHF  Had excessively low BP  Responding well now  Valsartan 40   Lowered diuretics  SCr improving

## 2018-02-16 NOTE — PHYSICAL THERAPY INITIAL EVALUATION ADULT - ADDITIONAL COMMENTS
2/15 CT head:  Age-appropriate involutional and ischemic gliotic changes. No hemorrhage.  2/13 XR chest: No focal lung consolidations.

## 2018-02-16 NOTE — PROGRESS NOTE ADULT - ASSESSMENT
The patient is a 72y Female was sent by nephrologist for low BP.    Hypotension:    DC HCTZ  Decrease Lasix/Hydralazine.  Nephro f/up noted.  HF eval appreciated.      RIKKI:  Likely prerenal  s/p IVF  BMP    DM II:  Lantus/Humalog/FSSS    DC planning tomorrow.

## 2018-02-16 NOTE — PROGRESS NOTE ADULT - SUBJECTIVE AND OBJECTIVE BOX
Patient is a 72y old  Female who presents with a chief complaint of The patient is a 72y Female was sent by nephrologist for low BP. (2018 16:39)      SUBJECTIVE / OVERNIGHT EVENTS:   Feels better.  Denies CP/SOB/Palpitation/HA.    MEDICATIONS  (STANDING):  aspirin enteric coated 81 milliGRAM(s) Oral daily  atorvastatin 40 milliGRAM(s) Oral at bedtime  dextrose 5%. 1000 milliLiter(s) (50 mL/Hr) IV Continuous <Continuous>  dextrose 50% Injectable 12.5 Gram(s) IV Push once  dextrose 50% Injectable 25 Gram(s) IV Push once  dextrose 50% Injectable 25 Gram(s) IV Push once  docusate sodium 100 milliGRAM(s) Oral two times a day  furosemide    Tablet 20 milliGRAM(s) Oral daily  heparin  Injectable 5000 Unit(s) SubCutaneous every 8 hours  hydrALAZINE 25 milliGRAM(s) Oral three times a day  insulin glargine Injectable (LANTUS) 32 Unit(s) SubCutaneous at bedtime  insulin lispro (HumaLOG) corrective regimen sliding scale   SubCutaneous three times a day before meals  insulin lispro Injectable (HumaLOG) 7 Unit(s) SubCutaneous three times a day before meals  isosorbide   dinitrate Tablet (ISORDIL) 20 milliGRAM(s) Oral three times a day  ketorolac 0.5% Ophthalmic Solution 1 Drop(s) Both EYES at bedtime  metoprolol succinate ER 50 milliGRAM(s) Oral daily  potassium chloride    Tablet ER 10 milliEquivalent(s) Oral daily  spironolactone 25 milliGRAM(s) Oral daily  valsartan 40 milliGRAM(s) Oral two times a day    MEDICATIONS  (PRN):  dextrose Gel 1 Dose(s) Oral once PRN Blood Glucose LESS THAN 70 milliGRAM(s)/deciliter  glucagon  Injectable 1 milliGRAM(s) IntraMuscular once PRN Glucose LESS THAN 70 milligrams/deciliter        CAPILLARY BLOOD GLUCOSE      POCT Blood Glucose.: 157 mg/dL (2018 20:56)  POCT Blood Glucose.: 81 mg/dL (2018 16:46)  POCT Blood Glucose.: 165 mg/dL (2018 12:12)  POCT Blood Glucose.: 99 mg/dL (2018 07:58)    I&O's Summary    15 Feb 2018 07:01  -  2018 07:00  --------------------------------------------------------  IN: 1040 mL / OUT: 300 mL / NET: 740 mL    2018 07:01  -  2018 23:37  --------------------------------------------------------  IN: 840 mL / OUT: 0 mL / NET: 840 mL        PHYSICAL EXAM:  GENERAL: NAD, well-developed  HEAD:  Atraumatic, Normocephalic  NECK: Supple, No JVD  CHEST/LUNG: Clear to auscultation bilaterally; No wheezing.  HEART: Regular rate and rhythm; No murmurs, rubs, or gallops  ABDOMEN: Soft, Nontender, Nondistended; Bowel sounds present  EXTREMITIES:   No clubbing, cyanosis, or edema  NEUROLOGY: AAO X 3  SKIN: No rashes    LABS:                        11.6   7.21  )-----------( 173      ( 2018 07:29 )             35.1     02-16    137  |  102  |  27<H>  ----------------------------<  89  4.2   |  22  |  1.42<H>    Ca    10.0      2018 07:41            Urinalysis Basic - ( 15 Feb 2018 22:55 )    Color: PL Yellow / Appearance: Clear / S.013 / pH: x  Gluc: x / Ketone: Negative  / Bili: Negative / Urobili: Negative   Blood: x / Protein: Negative / Nitrite: Negative   Leuk Esterase: Negative / RBC: x / WBC x   Sq Epi: x / Non Sq Epi: x / Bacteria: x      CAPILLARY BLOOD GLUCOSE      POCT Blood Glucose.: 157 mg/dL (2018 20:56)  POCT Blood Glucose.: 81 mg/dL (2018 16:46)  POCT Blood Glucose.: 165 mg/dL (2018 12:12)  POCT Blood Glucose.: 99 mg/dL (2018 07:58)                RADIOLOGY & ADDITIONAL TESTS:    Imaging Personally Reviewed:    Consultant(s) Notes Reviewed:      Care Discussed with Consultants/Other Providers:

## 2018-02-16 NOTE — CONSULT NOTE ADULT - PROBLEM SELECTOR RECOMMENDATION 2
HGAic daily labs  Continue insulin dosing as per primary team    Discontinue HCTZ for future use in a diabetic

## 2018-02-16 NOTE — PROGRESS NOTE ADULT - SUBJECTIVE AND OBJECTIVE BOX
Note Author: Raul Dhaliwal, Medicine PGY 2  [facility] Pager: [phone]  [facility] Pager: [phone]  *** Nephrology Attending attestation to follow       Patient is a 72y old  Female who presents with a chief complaint of The patient is a 72y Female was sent by nephrologist for low BP. (13 Feb 2018 20:23)    SUBJECTIVE / OVERNIGHT EVENTS:  No acute events overnight.     MEDICATIONS  (STANDING):  aspirin enteric coated 81 milliGRAM(s) Oral daily  atorvastatin 40 milliGRAM(s) Oral at bedtime  dextrose 5%. 1000 milliLiter(s) (50 mL/Hr) IV Continuous <Continuous>  dextrose 50% Injectable 12.5 Gram(s) IV Push once  dextrose 50% Injectable 25 Gram(s) IV Push once  dextrose 50% Injectable 25 Gram(s) IV Push once  docusate sodium 100 milliGRAM(s) Oral two times a day  furosemide    Tablet 20 milliGRAM(s) Oral daily  heparin  Injectable 5000 Unit(s) SubCutaneous every 8 hours  hydrALAZINE 25 milliGRAM(s) Oral three times a day  insulin glargine Injectable (LANTUS) 32 Unit(s) SubCutaneous at bedtime  insulin lispro (HumaLOG) corrective regimen sliding scale   SubCutaneous three times a day before meals  insulin lispro Injectable (HumaLOG) 7 Unit(s) SubCutaneous three times a day before meals  isosorbide   dinitrate Tablet (ISORDIL) 20 milliGRAM(s) Oral three times a day  ketorolac 0.5% Ophthalmic Solution 1 Drop(s) Both EYES at bedtime  metoprolol succinate ER 50 milliGRAM(s) Oral daily  potassium chloride    Tablet ER 10 milliEquivalent(s) Oral daily  spironolactone 25 milliGRAM(s) Oral daily  valsartan 40 milliGRAM(s) Oral daily    MEDICATIONS  (PRN):  dextrose Gel 1 Dose(s) Oral once PRN Blood Glucose LESS THAN 70 milliGRAM(s)/deciliter  glucagon  Injectable 1 milliGRAM(s) IntraMuscular once PRN Glucose LESS THAN 70 milligrams/deciliter      CAPILLARY BLOOD GLUCOSE      POCT Blood Glucose.: 99 mg/dL (16 Feb 2018 07:58)  POCT Blood Glucose.: 112 mg/dL (15 Feb 2018 22:59)  POCT Blood Glucose.: 93 mg/dL (15 Feb 2018 16:41)  POCT Blood Glucose.: 104 mg/dL (15 Feb 2018 11:50)    I&O's Summary    15 Feb 2018 07:01  -  16 Feb 2018 07:00  --------------------------------------------------------  IN: 1040 mL / OUT: 300 mL / NET: 740 mL    16 Feb 2018 07:01  -  16 Feb 2018 09:34  --------------------------------------------------------  IN: 120 mL / OUT: 0 mL / NET: 120 mL        Vital Signs Last 24 Hrs  T(C): 36.7 (16 Feb 2018 06:34), Max: 37 (15 Feb 2018 22:02)  T(F): 98 (16 Feb 2018 06:34), Max: 98.6 (15 Feb 2018 22:02)  HR: 60 (16 Feb 2018 08:34) (60 - 82)  BP: 98/58 (16 Feb 2018 08:34) (98/58 - 127/77)  BP(mean): --  RR: 18 (16 Feb 2018 06:34) (18 - 18)  SpO2: 98% (16 Feb 2018 06:34) (95% - 98%)    LABS:      02-16    137  |  102  |  27<H>  ----------------------------<  89  4.2   |  22  |  1.42<H>    Ca    10.0      16 Feb 2018 07:41      Leuk Esterase: Negative / RBC: x / WBC x   Sq Epi: x / Non Sq Epi: x / Bacteria: x Note Author: Raul Dhaliwal, Medicine PGY 2  [facility] Pager: [phone]  [facility] Pager: [phone]  *** Nephrology Attending attestation to follow       Patient is a 72y old  Female who presents with a chief complaint of The patient is a 72y Female was sent by nephrologist for low BP. (13 Feb 2018 20:23)    SUBJECTIVE / OVERNIGHT EVENTS:  No acute events overnight. Pt feels well, denied CP, SOB, reported lightheadedness  assoc'd w sitting up quickly in bed, no falls during hosp    MEDICATIONS  (STANDING):  aspirin enteric coated 81 milliGRAM(s) Oral daily  atorvastatin 40 milliGRAM(s) Oral at bedtime  dextrose 5%. 1000 milliLiter(s) (50 mL/Hr) IV Continuous <Continuous>  dextrose 50% Injectable 12.5 Gram(s) IV Push once  dextrose 50% Injectable 25 Gram(s) IV Push once  dextrose 50% Injectable 25 Gram(s) IV Push once  docusate sodium 100 milliGRAM(s) Oral two times a day  furosemide    Tablet 20 milliGRAM(s) Oral daily  heparin  Injectable 5000 Unit(s) SubCutaneous every 8 hours  hydrALAZINE 25 milliGRAM(s) Oral three times a day  insulin glargine Injectable (LANTUS) 32 Unit(s) SubCutaneous at bedtime  insulin lispro (HumaLOG) corrective regimen sliding scale   SubCutaneous three times a day before meals  insulin lispro Injectable (HumaLOG) 7 Unit(s) SubCutaneous three times a day before meals  isosorbide   dinitrate Tablet (ISORDIL) 20 milliGRAM(s) Oral three times a day  ketorolac 0.5% Ophthalmic Solution 1 Drop(s) Both EYES at bedtime  metoprolol succinate ER 50 milliGRAM(s) Oral daily  potassium chloride    Tablet ER 10 milliEquivalent(s) Oral daily  spironolactone 25 milliGRAM(s) Oral daily  valsartan 40 milliGRAM(s) Oral daily    MEDICATIONS  (PRN):  dextrose Gel 1 Dose(s) Oral once PRN Blood Glucose LESS THAN 70 milliGRAM(s)/deciliter  glucagon  Injectable 1 milliGRAM(s) IntraMuscular once PRN Glucose LESS THAN 70 milligrams/deciliter      CAPILLARY BLOOD GLUCOSE      POCT Blood Glucose.: 99 mg/dL (16 Feb 2018 07:58)  POCT Blood Glucose.: 112 mg/dL (15 Feb 2018 22:59)  POCT Blood Glucose.: 93 mg/dL (15 Feb 2018 16:41)  POCT Blood Glucose.: 104 mg/dL (15 Feb 2018 11:50)    I&O's Summary    15 Feb 2018 07:01  -  16 Feb 2018 07:00  --------------------------------------------------------  IN: 1040 mL / OUT: 300 mL / NET: 740 mL    16 Feb 2018 07:01  -  16 Feb 2018 09:34  --------------------------------------------------------  IN: 120 mL / OUT: 0 mL / NET: 120 mL        Vital Signs Last 24 Hrs  T(C): 36.7 (16 Feb 2018 06:34), Max: 37 (15 Feb 2018 22:02)  T(F): 98 (16 Feb 2018 06:34), Max: 98.6 (15 Feb 2018 22:02)  HR: 60 (16 Feb 2018 08:34) (60 - 82)  BP: 98/58 (16 Feb 2018 08:34) (98/58 - 127/77)  BP(mean): --  RR: 18 (16 Feb 2018 06:34) (18 - 18)  SpO2: 98% (16 Feb 2018 06:34) (95% - 98%)    PHYSICAL EXAM:  General: NAD, well-developed  Eyes: EOMI  Neck: Supple, No JVD  Chest/Lung: Clear to auscultation bilaterally; No wheezes  Heart: Regular rate and rhythm; No murmurs, rubs, or gallops. (+) delayed capillary refill WNL  Abdome: Soft, Nontender, Nondistended; Bowel sounds present  Extremities: No lower extremity edema.   Psych: AAOx3  Neurology: non-focal, strength and sensation grossly intact UE and LE BL.   Skin: No rashes or lesions    LABS:      02-16    137  |  102  |  27<H>  ----------------------------<  89  4.2   |  22  |  1.42<H>    Ca    10.0      16 Feb 2018 07:41      Leuk Esterase: Negative / RBC: x / WBC x   Sq Epi: x / Non Sq Epi: x / Bacteria: x

## 2018-02-16 NOTE — PROGRESS NOTE ADULT - ASSESSMENT
72F hx CHF (last TTE showing EF 25%), HTN, AICD, CAD on Effient, DM (on insulin), c/o of weakness at nephrologist Dr Waters's office found to be hypotensive (SBPs 70s)

## 2018-02-16 NOTE — DISCHARGE NOTE ADULT - PLAN OF CARE
resolution Take medication as directed  Follow-up with your cardiologist Weigh yourself daily.  If you gain 3lbs in 3 days, or 5lbs in a week call your Health Care Provider.  Do not eat or drink foods containing more than 2000mg of salt (sodium) in your diet every day.  Call your Health Care Provider if you have any swelling or increased swelling in your feet, ankles, and/or stomach.  Take all of your medication as directed.  If you become dizzy call your Health Care Provider. Avoid Nephrotoxic medication  Follow-up with your Nephrologist HgA1C this admission.  Make sure you get your HgA1c checked every three months.  If you take oral diabetes medications, check your blood glucose two times a day.  If you take insulin, check your blood glucose before meals and at bedtime.  It's important not to skip any meals.  Keep a log of your blood glucose results and always take it with you to your doctor appointments.  Keep a list of your current medications including injectables and over the counter medications and bring this medication list with you to all your doctor appointments.  If you have not seen your ophthalmologist this year call for appointment.  Check your feet daily for redness, sores, or openings. Do not self treat. If no improvement in two days call your primary care physician for an appointment.  Low blood sugar (hypoglycemia) is a blood sugar below 70mg/dl. Check your blood sugar if you feel signs/symptoms of hypoglycemia. If your blood sugar is below 70 take 15 grams of carbohydrates (ex 4 oz of apple juice, 3-4 glucose tablets, or 4-6 oz of regular soda) wait 15 minutes and repeat blood sugar to make sure it comes up above 70.  If your blood sugar is above 70 and you are due for a meal, have a meal.  If you are not due for a meal have a snack.  This snack helps keeps your blood sugar at a safe range. symptom management disease management HgA1C this admission 6.8  Make sure you get your HgA1c checked every three months.  If you take oral diabetes medications, check your blood glucose two times a day.  If you take insulin, check your blood glucose before meals and at bedtime.  It's important not to skip any meals.  Keep a log of your blood glucose results and always take it with you to your doctor appointments.  Keep a list of your current medications including injectables and over the counter medications and bring this medication list with you to all your doctor appointments.  If you have not seen your ophthalmologist this year call for appointment.  Check your feet daily for redness, sores, or openings. Do not self treat. If no improvement in two days call your primary care physician for an appointment.  Low blood sugar (hypoglycemia) is a blood sugar below 70mg/dl. Check your blood sugar if you feel signs/symptoms of hypoglycemia. If your blood sugar is below 70 take 15 grams of carbohydrates (ex 4 oz of apple juice, 3-4 glucose tablets, or 4-6 oz of regular soda) wait 15 minutes and repeat blood sugar to make sure it comes up above 70.  If your blood sugar is above 70 and you are due for a meal, have a meal.  If you are not due for a meal have a snack.  This snack helps keeps your blood sugar at a safe range.

## 2018-02-16 NOTE — CONSULT NOTE ADULT - ASSESSMENT
This is a 72 year old female ICM with decreased EF CAD  with DM and CRI with episode of hypotension in nephrologist office    Pt appears well compensated This is a 72 year old female ICM with decreased EF CAD  with DM and CRI with episode of hypotension in nephrologist office    Pt appears well compensated   and euvolemic This is a 72 year old woman with an ischemic cardiomyopathy and reduced LV ejection fraction with chronic systolic heart failure. She was admitted with hypotension and worsening renal function which has improved with reduced doses of vasodilators and volume. She currently has no evidence of congestion or low output on exam. Her acute renal failure has resolved.

## 2018-02-16 NOTE — PHYSICAL THERAPY INITIAL EVALUATION ADULT - LIVES WITH, PROFILE
Pt states she resides with daughter and grandchild in a private home with 5 steps outside and is independent with ADL and use a cane to assist with ambulation./children

## 2018-02-16 NOTE — CONSULT NOTE ADULT - PROBLEM SELECTOR RECOMMENDATION 9
Obtain records from outside hospital   Repeat echo Obtain records from outside hospital   Repeat echo  Continue lasix 20 mg daily ( Home 40)    Increase Valsartan 40 mg po BID ( Home dose 160/ HCTZ) with hold for less then sbp 90   Continue hydralazine 25 TID ( home 50 TID )   Continue Toprol 50 mg po daily  Continue Isordil 20 mg TID hold SBP>90   Obtain AICD record and interrogate device Continue current regimen.

## 2018-02-16 NOTE — DISCHARGE NOTE ADULT - ADDITIONAL INSTRUCTIONS
Make appointments to follow up with your physicians  Make an appointment to follow up in the Heart Failure Clinic at [facility]  Bring all discharge paperwork including discharge medication list to follow up appointments

## 2018-02-16 NOTE — PROGRESS NOTE ADULT - PROBLEM SELECTOR PLAN 2
- HF team consulted yest, pt w/ severe ICM s/p AICD  - would cont AL reduction w/ hydralazine, lasix, ARB to optimize cardiac function  - would defer to HF and primary teams for further mgmt.

## 2018-02-16 NOTE — DISCHARGE NOTE ADULT - SECONDARY DIAGNOSIS.
Chronic congestive heart failure, unspecified congestive heart failure type RIKKI (acute kidney injury) DM (diabetes mellitus)

## 2018-02-16 NOTE — PHYSICAL THERAPY INITIAL EVALUATION ADULT - PERTINENT HX OF CURRENT PROBLEM, REHAB EVAL
72F hx CHF, HTN, CAD on Effient, DM, c/o of weakness/ hypotension/ elevated BUN/Cr to 69/1.8 at nephrologist Dr Waters's office, with reported B lines on lung US. No recent infection, no fevers/chills, + chest pain at rest, intermittent, not present today, worsened while supine and worsened with exertion. Report SBPs were 70s, at ED SBP 110s. Notes this has been an ongoing issue x weeks, with adjustments in BP medications and lasix, took 40mg lasix PTA.

## 2018-02-16 NOTE — DISCHARGE NOTE ADULT - REASON FOR ADMISSION
The patient is a 72y Female was sent by nephrologist for low BP. sent by nephrologist for low Blood Pressure.

## 2018-02-16 NOTE — PHYSICAL THERAPY INITIAL EVALUATION ADULT - CRITERIA FOR SKILLED THERAPEUTIC INTERVENTIONS
functional limitations in following categories/risk reduction/prevention/impairments found/rehab potential/predicted duration of therapy intervention/anticipated equipment needs at discharge/therapy frequency/anticipated discharge recommendation

## 2018-02-16 NOTE — DISCHARGE NOTE ADULT - PATIENT PORTAL LINK FT
You can access the [facility] Patient Portal, offered by [facility], by registering with the following website: [email or web address]

## 2018-02-16 NOTE — DISCHARGE NOTE ADULT - CARE PROVIDERS DIRECT ADDRESSES
[email or web address]

## 2018-02-16 NOTE — DISCHARGE NOTE ADULT - MEDICATION SUMMARY - MEDICATIONS TO TAKE
I will START or STAY ON the medications listed below when I get home from the hospital:    spironolactone 25 mg oral tablet  -- 1 tab(s) by mouth once a day  -- Indication: For Chronic systolic (congestive) heart failure    aspirin 81 mg oral delayed release tablet  -- 1 tab(s) by mouth once a day  -- Indication: For prophylactic    valsartan 40 mg oral tablet  -- 1 tab(s) by mouth 2 times a day  -- Indication: For Chronic systolic (congestive) heart failure    isosorbide dinitrate 20 mg oral tablet  -- 1 tab(s) by mouth 3 times a day  -- Indication: For Chest discomfort    Levemir 100 units/mL subcutaneous solution  -- 32 unit(s) subcutaneous once a day (at bedtime)  -- Indication: For Diabetes    NovoLOG 100 units/mL subcutaneous solution  -- 7 unit(s) subcutaneous 3 times a day (before meals)  -- Indication: For Diabetes    Crestor 10 mg oral tablet  -- 1 tab(s) by mouth once a day (at bedtime)  -- Indication: For Cholesterol management    Effient 10 mg oral tablet  -- 1 tab(s) by mouth once a day  -- Indication: For CAD    metoprolol succinate 50 mg oral tablet, extended release  -- 1 tab(s) by mouth once a day  -- Indication: For Chronic systolic (congestive) heart failure    furosemide 20 mg oral tablet  -- 1 tab(s) by mouth once a day  -- Indication: For Diuretic      docusate sodium 100 mg oral capsule  -- 1 cap(s) by mouth 2 times a day  -- Indication: For laxative    Klor-Con 8 oral tablet, extended release  -- 1 tab(s) by mouth once a day  -- Indication: For suppliment while on diuretic    Nevanac 0.1% ophthalmic suspension  -- 1 drop(s) to each affected eye 2 times a day  -- Indication: For eye drop    hydrALAZINE 10 mg oral tablet  -- 1 tab(s) by mouth every 8 hours  -- Indication: For Hypertension

## 2018-02-16 NOTE — PROGRESS NOTE ADULT - PROBLEM SELECTOR PLAN 1
- renal team consulted yest for RIKKI, rec'd decr ARB from 80 to 40mg in setting of RIKKI and hypotension.   - would above change, sCr improved from 1.7 yest to 1.4 today. UOP yest at 300 was below goal, follow up values for today  - c/w spironolactone, lasix

## 2018-02-16 NOTE — DISCHARGE NOTE ADULT - MEDICATION SUMMARY - MEDICATIONS TO CHANGE
I will SWITCH the dose or number of times a day I take the medications listed below when I get home from the hospital:    hydrALAZINE 50 mg oral tablet  -- 1 tab(s) by mouth 3 times a day    Lasix 40 mg oral tablet  -- 1 tab(s) by mouth once a day    valsartan-hydrochlorothiazide 160mg-12.5mg oral tablet  -- 1 tab(s) by mouth once a day

## 2018-02-16 NOTE — DISCHARGE NOTE ADULT - HOSPITAL COURSE
72 year old woman with an ischemic cardiomyopathy and reduced LV ejection fraction with chronic systolic heart failure. She was admitted with hypotension and worsening renal function which has improved with reduced doses of vasodilators and volume. She currently has no evidence of congestion or low output on exam. Her acute renal failure has resolved.  To follow up with her Nephrologist and at the Heart Failure Clinic

## 2018-02-16 NOTE — CONSULT NOTE ADULT - ATTENDING COMMENTS
72F hx CHF (last TTE showing EF 25%), HTN, AICD, CAD on Effient, DM (on insulin), c/o of weakness at nephrologist Dr Waters's office found to be hypotensive (SBPs 70s)   Probable ischemic RIKKI but does appear to have some CKD underlying  Relative hypotension  Would decrease valsartan to 40 mg  Agree with diuretic changes.
She appears well compensated at the moment. I would continue with current regimen.     Please call me with further questions at [phone].

## 2018-02-16 NOTE — DISCHARGE NOTE ADULT - CARE PROVIDER_API CALL
Randy Zhu), Internal Medicine; Nephrology  [address] [place] [address]  [address] [place] [address]  Phone: [phone]  Fax: [phone] Randy Zhu), Internal Medicine; Nephrology  [address]  [address] [place] [address]  Phone: [phone]  Fax: [phone]    Marcos Chavez (MD; MPH), Adv Heart Fail Trnsplnt Cardio; Cardiology  [address] [place] [address]  Phone: [phone]  Fax: [phone]

## 2018-02-16 NOTE — CONSULT NOTE ADULT - SUBJECTIVE AND OBJECTIVE BOX
HPI:  A 72F hx CHF, HTN, CAD on Effient,( Multiple Stents  DM, c/o of weakness/ hypotension/ elevated BUN/Cr to 69/1.8 at nephrologist Dr Waters's office, with reported B lines on lung US. No recent infection, no fevers/chills, + chest pain at rest, intermittent, not present today, worsened while supine and worsened with exertion. Report SBPs were 70s, at ED SBP 110s. Notes this has been an ongoing issue x weeks, with adjustments in BP medications and lasix, took 40mg lasix )      PAST MEDICAL & SURGICAL HISTORY:  Diabetes Hypertension  HLD  Afib  Chronic congestive heart failure, unspecified congestive heart failure type:   CAD (coronary artery disease): s/p stent of mLAD, rPDA, rPLS  AICD (automatic cardioverter/defibrillator) present  Pacemaker    MEDICATIONS  (STANDING):  aspirin enteric coated 81 milliGRAM(s) Oral daily  atorvastatin 40 milliGRAM(s) Oral at bedtime  docusate sodium 100 milliGRAM(s) Oral two times a day  furosemide    Tablet 20 milliGRAM(s) Oral daily  heparin  Injectable 5000 Unit(s) SubCutaneous every 8 hours  hydrALAZINE 25 milliGRAM(s) Oral three times a day  insulin glargine Injectable (LANTUS) 32 Unit(s) SubCutaneous at bedtime  insulin lispro (HumaLOG) corrective regimen sliding scale   SubCutaneous three times a day before meals  insulin lispro Injectable (HumaLOG) 7 Unit(s) SubCutaneous three times a day before meals  isosorbide   dinitrate Tablet (ISORDIL) 20 milliGRAM(s) Oral three times a day  ketorolac 0.5% Ophthalmic Solution 1 Drop(s) Both EYES at bedtime  metoprolol succinate ER 50 milliGRAM(s) Oral daily  potassium chloride    Tablet ER 10 milliEquivalent(s) Oral daily  spironolactone 25 milliGRAM(s) Oral daily  valsartan 40 milliGRAM(s) Oral daily      ROS : All 12 points are negative unless mention in HPI     No Known Allergies    SOCIAL HISTORY: Non smoker     FAMILY HISTORY:  No pertinent family history in first degree relatives      Vital Signs Last 24 Hrs  T(C): 36.7 (16 Feb 2018 06:34), Max: 37 (15 Feb 2018 22:02)  T(F): 98 (16 Feb 2018 06:34), Max: 98.6 (15 Feb 2018 22:02)  HR: 62 (16 Feb 2018 06:34) (60 - 82)  BP: 104/71 (16 Feb 2018 06:34) (99/62 - 127/77)  RR: 18 (16 Feb 2018 06:34) (18 - 18)  SpO2: 98% (16 Feb 2018 06:34) (95% - 98%)      Constitutional: well developed, well nourished, no deformities and no acute distress    Neurological: Alert & Oriented x 3, BISHOP, no focal deficits    HEENT: NC/AT, PERRLA, EOMI,  Neck supple.    Respiratory: CTA B/L, No wheezing/crackles/rhonchi    Cardiovascular: (+) S1 & S2, RRR, No m/r/g    Gastrointestinal: soft, NT, nondistended, (+) BS    Genitourinary: non distended bladder, voiding freely    Extremities: No pedal edema, No clubbing, No cyanosis    Skin:  normal skin color and pigmentation, no skin lesions          140  |  102  |  30<H>  ----------------------------<  103<H>  3.9   |  24  |  1.72<H>    Ca    9.3      15 Feb 2018 07:36 HPI: ( History obtained from Daughter Sanna )   A 72F with ICM reported EF 20% with history of  Multiple Stents  at [facility] and [facility] followed by a Dr Tirado ( [phone])  Her daughter reports her only hospitalization for heart failure was 3 years ago at [facility] where she was admitted fro many days requiring IV diuretics  and bi-pap. Mrs Amaya lives with her daughter and reports no exertional or rest SOB , She is able to care for herself and cook her meals with out issue.  She is able to climb a flight of stairs with out stopping and walk 2 blocks without SOB . She denies palpitations , leg edema,or  weight gain . She has intermittent periods of LH and feeling funny in her head . she also reports daily fatigue and episodes of chest pain at the site of her AICD that are self limiting. DShe has a normal appetite and bowel/bladder habits. She was at [facility] for  a colonoscopy last week that was reported normal from daughter. Mrs Amaya has a AICD that was inserted at [facility] several years ago because of the weak heart.  The patient  and her daughter report regular check ups and interrogations but if has never shocker her.   Mrs Amaya has had DM and HTN for many years . Her endocrinologist noted a increase  trend in her creatinine and recommended that she be seen by a nephrologist . While in the nephrologist office  DM, c/o of weakness/ hypotension/ elevated BUN/Cr to 69/1.8 at nephrologist Dr Waters's office, with reported B lines on lung US. No recent infection, no fevers/chills, + chest pain at rest, intermittent, not present today, worsened while supine and worsened with exertion. Report SBPs were 70s, at ED SBP 110s. Notes this has been an ongoing issue x weeks, with adjustments in BP medications and lasix, took 40mg lasix )      PAST MEDICAL & SURGICAL HISTORY:  Diabetes Hypertension  HLD  Afib  Chronic congestive heart failure, unspecified congestive heart failure type:   CAD (coronary artery disease): s/p stent of mLAD, rPDA, rPLS  AICD (automatic cardioverter/defibrillator) present  Pacemaker    MEDICATIONS  (STANDING):  aspirin enteric coated 81 milliGRAM(s) Oral daily  atorvastatin 40 milliGRAM(s) Oral at bedtime  docusate sodium 100 milliGRAM(s) Oral two times a day  furosemide    Tablet 20 milliGRAM(s) Oral daily  heparin  Injectable 5000 Unit(s) SubCutaneous every 8 hours  hydrALAZINE 25 milliGRAM(s) Oral three times a day  insulin glargine Injectable (LANTUS) 32 Unit(s) SubCutaneous at bedtime  insulin lispro (HumaLOG) corrective regimen sliding scale   SubCutaneous three times a day before meals  insulin lispro Injectable (HumaLOG) 7 Unit(s) SubCutaneous three times a day before meals  isosorbide   dinitrate Tablet (ISORDIL) 20 milliGRAM(s) Oral three times a day  ketorolac 0.5% Ophthalmic Solution 1 Drop(s) Both EYES at bedtime  metoprolol succinate ER 50 milliGRAM(s) Oral daily  potassium chloride    Tablet ER 10 milliEquivalent(s) Oral daily  spironolactone 25 milliGRAM(s) Oral daily  valsartan 40 milliGRAM(s) Oral daily      ROS : All 12 points are negative unless mention in HPI     No Known Allergies    SOCIAL HISTORY: Non smoker     FAMILY HISTORY:  No pertinent family history in first degree relatives      Vital Signs Last 24 Hrs  T(C): 36.7 (16 Feb 2018 06:34), Max: 37 (15 Feb 2018 22:02)  T(F): 98 (16 Feb 2018 06:34), Max: 98.6 (15 Feb 2018 22:02)  HR: 62 (16 Feb 2018 06:34) (60 - 82)  BP: 104/71 (16 Feb 2018 06:34) (99/62 - 127/77)  RR: 18 (16 Feb 2018 06:34) (18 - 18)  SpO2: 98% (16 Feb 2018 06:34) (95% - 98%)      Constitutional: well developed, well nourished, no deformities and no acute distress    Neurological: Alert & Oriented x 3, BISHOP, no focal deficits    HEENT: NC/AT, PERRLA, EOMI,  Neck supple.    Respiratory: CTA B/L, No wheezing/crackles/rhonchi    Cardiovascular: (+) S1 & S2, RRR, No m/r/g    Gastrointestinal: soft, NT, nondistended, (+) BS    Genitourinary: non distended bladder, voiding freely    Extremities: No pedal edema, No clubbing, No cyanosis    Skin:  normal skin color and pigmentation, no skin lesions          140  |  102  |  30<H>  ----------------------------<  103<H>  3.9   |  24  |  1.72<H>    Ca    9.3      15 Feb 2018 07:36 HPI: ( History obtained from Daughter Sanna )   A 72F with ICM reported EF 20% with history of  Multiple Stents  at [facility] and [facility] followed by a Dr Tirado ( [phone])  Her daughter reports her only hospitalization for heart failure was 3 years ago at [facility] where she was admitted for  many days requiring IV diuretics  and bi-pap. Mrs Amaya lives with her daughter and reports no exertional or rest SOB , She is able to care for herself and cook her meals with out issue.  She is able to climb a flight of stairs with out stopping and walk 2 blocks without SOB . She denies palpitations , leg edema,or  weight gain . She has intermittent periods of LH and feeling funny in her head . she also reports daily fatigue and episodes of chest pain at the site of her AICD that are self limiting. She has a normal appetite and bowel/bladder habits. She was at [facility] for  a colonoscopy last week that was reported normal from daughter. Mrs Amaya has a AICD that was inserted at [facility]( 2014?)  several years ago because of the weak heart.  The patient  and her daughter report regular check ups and interrogations but if has never shocker her.   Mrs Amaya has had DM and HTN for many years . Her endocrinologist noted a increase  trend in her creatinine and recommended that she be seen by a nephrologist . While in the nephrologist office  she  c/o of weakness/ hypotension/ elevated BUN/Cr to 69/1.8 at nephrologist Dr Waters's office A Lung ultrasound was done positive for B lines . No recent infection, no fevers/chills.   Pt is a poor historian with some limitation and recall on dates / time and place. Her daughter has noted this for several years( 5)  with a steady decline in her memory; She was seen by a neurologist with plans for further testing as per daughter . CT of head at [facility] done with no acute changes   Of note a call to her Cardiologist Dr Tirado for further documents to be faxed ( Ie Cath report , echo , AICD information )        Home medications included  · 	metoprolol succinate 50 mg oral tablet, extended release: 1 tab(s) orally once a day, Last Dose Taken:    · 	Klor-Con 8 oral tablet, extended release: 1 tab(s) orally once a day, Last Dose Taken:    · 	aspirin 81 mg oral tablet: 1 tab(s) orally once a day, Last Dose Taken:    · 	hydrALAZINE 50 mg oral tablet: 1 tab(s) orally 3 times a day, Last Dose Taken:    · 	NovoLOG 100 units/mL subcutaneous solution: 7 unit(s) subcutaneous 3 times a day (before meals), Last Dose Taken:    · 	Crestor 10 mg oral tablet: 1 tab(s) orally once a day (at bedtime), Last Dose Taken:    · 	isosorbide dinitrate 20 mg oral tablet: 1 tab(s) orally 3 times a day, Last Dose Taken:    · 	Nevanac 0.1% ophthalmic suspension: 1 drop(s) to each affected eye 2 times a day, Last Dose Taken:    · 	spironolactone 25 mg oral tablet: 1 tab(s) orally once a day  · 	Lasix 40 mg oral tablet: 1 tab(s) orally once a day, Last Dose Taken:    · 	valsartan-hydrochlorothiazide 160mg-12.5mg oral tablet: 1 tab(s) orally once a day  · 	Levemir 100 units/mL subcutaneous solution: 32 unit(s) subcutaneous once a day (at bedtime)    .  PAST MEDICAL & SURGICAL HISTORY:  Diabetes Hypertension  HLD  Afib  Chronic congestive heart failure, unspecified congestive heart failure type:   CAD (coronary artery disease): s/p stent of mLAD, rPDA, rPLS  AICD (automatic cardioverter/defibrillator) present  Pacemaker    MEDICATIONS  (STANDING):  aspirin enteric coated 81 milliGRAM(s) Oral daily  atorvastatin 40 milliGRAM(s) Oral at bedtime  docusate sodium 100 milliGRAM(s) Oral two times a day  furosemide    Tablet 20 milliGRAM(s) Oral daily  heparin  Injectable 5000 Unit(s) SubCutaneous every 8 hours  hydrALAZINE 25 milliGRAM(s) Oral three times a day  insulin glargine Injectable (LANTUS) 32 Unit(s) SubCutaneous at bedtime  insulin lispro (HumaLOG) corrective regimen sliding scale   SubCutaneous three times a day before meals  insulin lispro Injectable (HumaLOG) 7 Unit(s) SubCutaneous three times a day before meals  isosorbide   dinitrate Tablet (ISORDIL) 20 milliGRAM(s) Oral three times a day  ketorolac 0.5% Ophthalmic Solution 1 Drop(s) Both EYES at bedtime  metoprolol succinate ER 50 milliGRAM(s) Oral daily  potassium chloride    Tablet ER 10 milliEquivalent(s) Oral daily  spironolactone 25 milliGRAM(s) Oral daily  valsartan 40 milliGRAM(s) Oral daily      ROS : All 12 points are negative unless mention in HPI     No Known Allergies    SOCIAL HISTORY: Non smoker     FAMILY HISTORY:  No pertinent family history in first degree relatives      Vital Signs Last 24 Hrs  T(C): 36.7 (16 Feb 2018 06:34), Max: 37 (15 Feb 2018 22:02)  T(F): 98 (16 Feb 2018 06:34), Max: 98.6 (15 Feb 2018 22:02)  HR: 62 (16 Feb 2018 06:34) (60 - 82)  BP: 104/71 (16 Feb 2018 06:34) (99/62 - 127/77)  RR: 18 (16 Feb 2018 06:34) (18 - 18)  SpO2: 98% (16 Feb 2018 06:34) (95% - 98%)      Constitutional: well developed, well nourished, no deformities and no acute distress    Neurological: Alert & Oriented x 3, BISHOP, no focal deficits    HEENT: NC/AT, PERRLA, EOMI,  Neck supple.    Respiratory: CTA B/L, No wheezing/crackles/rhonchi    Cardiovascular: (+) S1 & S2, RRR, No m/r/g    Gastrointestinal: soft, NT, nondistended, (+) BS    Genitourinary: non distended bladder, voiding freely    Extremities: No pedal edema, No clubbing, No cyanosis    Skin:  normal skin color and pigmentation, no skin lesions        02-16    137  |  102  |  27<H>  ----------------------------<  89  4.2   |  22  |  1.42<H>    Ca    10.0      16 Feb 2018 07:41      140  |  102  |  30<H>  ----------------------------<  103<H>  3.9   |  24  |  1.72<H>    Ca    9.3      15 Feb 2018 07:36                          11.6   7.21  )-----------( 173      ( 16 Feb 2018 07:29 )             35.1 HPI: ( History obtained from Daughter Sanna )   A 72F with ICM reported EF 20% with history of  Multiple Stents  at [facility] followed by a Dr Tirado ( [phone])  Her daughter reports her only hospitalization for heart failure was 3 years ago at [facility] where she was admitted for  many days requiring IV diuretics  and bi-pap. Mrs Amaya lives with her daughter and reports no exertional or rest SOB , She is able to care for herself and cook her meals with out issue.  She is able to climb a flight of stairs with out stopping and walk 2 blocks without SOB . She denies palpitations , leg edema,or  weight gain . She has intermittent periods of LH and feeling funny in her head . she also reports daily fatigue and episodes of chest pain at the site of her AICD that are self limiting. She has a normal appetite and bowel/bladder habits. She was at [facility] for  a colonoscopy last week that was reported normal from daughter. Mrs Amaya has a AICD that was inserted at [facility]( 2014?)  several years ago because of the weak heart.  The patient  and her daughter report regular check ups and interrogations but if has never shocker her.   Mrs Amaya has had DM and HTN for many years . Her endocrinologist noted a increase  trend in her creatinine and recommended that she be seen by a nephrologist . While in the nephrologist office  she  c/o of weakness/ hypotension/ elevated BUN/Cr to 69/1.8 at nephrologist Dr Waters's office A Lung ultrasound was done positive for B lines . No recent infection, no fevers/chills.   Pt is a poor historian with some limitation and recall on dates / time and place. Her daughter has noted this for several years( 5)  with a steady decline in her memory; She was seen by a neurologist with plans for further testing as per daughter . CT of head at [facility] done with no acute changes   Of note a call to her Cardiologist Dr Tirado for further documents to be faxed ( Ie Cath report , echo , AICD information )        Home medications included  · 	metoprolol succinate 50 mg oral tablet, extended release: 1 tab(s) orally once a day, Last Dose Taken:    · 	Klor-Con 8 oral tablet, extended release: 1 tab(s) orally once a day, Last Dose Taken:    · 	aspirin 81 mg oral tablet: 1 tab(s) orally once a day, Last Dose Taken:    · 	hydrALAZINE 50 mg oral tablet: 1 tab(s) orally 3 times a day, Last Dose Taken:    · 	NovoLOG 100 units/mL subcutaneous solution: 7 unit(s) subcutaneous 3 times a day (before meals), Last Dose Taken:    · 	Crestor 10 mg oral tablet: 1 tab(s) orally once a day (at bedtime), Last Dose Taken:    · 	isosorbide dinitrate 20 mg oral tablet: 1 tab(s) orally 3 times a day, Last Dose Taken:    · 	Nevanac 0.1% ophthalmic suspension: 1 drop(s) to each affected eye 2 times a day, Last Dose Taken:    · 	spironolactone 25 mg oral tablet: 1 tab(s) orally once a day  · 	Lasix 40 mg oral tablet: 1 tab(s) orally once a day, Last Dose Taken:    · 	valsartan-hydrochlorothiazide 160mg-12.5mg oral tablet: 1 tab(s) orally once a day  · 	Levemir 100 units/mL subcutaneous solution: 32 unit(s) subcutaneous once a day (at bedtime)    .  PAST MEDICAL & SURGICAL HISTORY:  Diabetes Hypertension  HLD  Afib  Chronic congestive heart failure, unspecified congestive heart failure type:   CAD (coronary artery disease): s/p stent of mLAD, rPDA, rPLS  AICD (automatic cardioverter/defibrillator) present  Pacemaker    MEDICATIONS  (STANDING):  aspirin enteric coated 81 milliGRAM(s) Oral daily  atorvastatin 40 milliGRAM(s) Oral at bedtime  docusate sodium 100 milliGRAM(s) Oral two times a day  furosemide    Tablet 20 milliGRAM(s) Oral daily  heparin  Injectable 5000 Unit(s) SubCutaneous every 8 hours  hydrALAZINE 25 milliGRAM(s) Oral three times a day  insulin glargine Injectable (LANTUS) 32 Unit(s) SubCutaneous at bedtime  insulin lispro (HumaLOG) corrective regimen sliding scale   SubCutaneous three times a day before meals  insulin lispro Injectable (HumaLOG) 7 Unit(s) SubCutaneous three times a day before meals  isosorbide   dinitrate Tablet (ISORDIL) 20 milliGRAM(s) Oral three times a day  ketorolac 0.5% Ophthalmic Solution 1 Drop(s) Both EYES at bedtime  metoprolol succinate ER 50 milliGRAM(s) Oral daily  potassium chloride    Tablet ER 10 milliEquivalent(s) Oral daily  spironolactone 25 milliGRAM(s) Oral daily  valsartan 40 milliGRAM(s) Oral daily      ROS : All 12 points are negative unless mention in HPI     No Known Allergies    SOCIAL HISTORY: Non smoker     FAMILY HISTORY:  No pertinent family history in first degree relatives      Vital Signs Last 24 Hrs  T(C): 36.7 (16 Feb 2018 06:34), Max: 37 (15 Feb 2018 22:02)  T(F): 98 (16 Feb 2018 06:34), Max: 98.6 (15 Feb 2018 22:02)  HR: 62 (16 Feb 2018 06:34) (60 - 82)  BP: 104/71 (16 Feb 2018 06:34) (99/62 - 127/77)  RR: 18 (16 Feb 2018 06:34) (18 - 18)  SpO2: 98% (16 Feb 2018 06:34) (95% - 98%)      Constitutional: well developed, well nourished, no deformities and no acute distress    Neurological: Alert & Oriented x 3, BISHOP, no focal deficits    HEENT: NC/AT, PERRLA, EOMI,  Neck supple.    Respiratory: CTA B/L, No wheezing/crackles/rhonchi    Cardiovascular: (+) S1 & S2, RRR, No m/r/g    Gastrointestinal: soft, NT, nondistended, (+) BS    Genitourinary: non distended bladder, voiding freely    Extremities: No pedal edema, No clubbing, No cyanosis    Skin:  normal skin color and pigmentation, no skin lesions        02-16    137  |  102  |  27<H>  ----------------------------<  89  4.2   |  22  |  1.42<H>    Ca    10.0      16 Feb 2018 07:41      140  |  102  |  30<H>  ----------------------------<  103<H>  3.9   |  24  |  1.72<H>    Ca    9.3      15 Feb 2018 07:36                          11.6   7.21  )-----------( 173      ( 16 Feb 2018 07:29 )             35.1       Serum Pro-Brain Natriuretic Peptide: 128 pg/mL (02.13.18 @ 15:34)

## 2018-02-17 LAB
ANION GAP SERPL CALC-SCNC: 13 MMOL/L — SIGNIFICANT CHANGE UP (ref 5–17)
BUN SERPL-MCNC: 30 MG/DL — HIGH (ref 7–23)
CALCIUM SERPL-MCNC: 9.9 MG/DL — SIGNIFICANT CHANGE UP (ref 8.4–10.5)
CHLORIDE SERPL-SCNC: 105 MMOL/L — SIGNIFICANT CHANGE UP (ref 96–108)
CO2 SERPL-SCNC: 21 MMOL/L — LOW (ref 22–31)
CREAT SERPL-MCNC: 1.38 MG/DL — HIGH (ref 0.5–1.3)
GLUCOSE BLDC GLUCOMTR-MCNC: 105 MG/DL — HIGH (ref 70–99)
GLUCOSE BLDC GLUCOMTR-MCNC: 124 MG/DL — HIGH (ref 70–99)
GLUCOSE BLDC GLUCOMTR-MCNC: 142 MG/DL — HIGH (ref 70–99)
GLUCOSE BLDC GLUCOMTR-MCNC: 95 MG/DL — SIGNIFICANT CHANGE UP (ref 70–99)
GLUCOSE SERPL-MCNC: 101 MG/DL — HIGH (ref 70–99)
HCT VFR BLD CALC: 33.1 % — LOW (ref 34.5–45)
HGB BLD-MCNC: 11.1 G/DL — LOW (ref 11.5–15.5)
MAGNESIUM SERPL-MCNC: 1.9 MG/DL — SIGNIFICANT CHANGE UP (ref 1.6–2.6)
MCHC RBC-ENTMCNC: 31.2 PG — SIGNIFICANT CHANGE UP (ref 27–34)
MCHC RBC-ENTMCNC: 33.5 GM/DL — SIGNIFICANT CHANGE UP (ref 32–36)
MCV RBC AUTO: 93 FL — SIGNIFICANT CHANGE UP (ref 80–100)
PHOSPHATE SERPL-MCNC: 3 MG/DL — SIGNIFICANT CHANGE UP (ref 2.5–4.5)
PLATELET # BLD AUTO: 171 K/UL — SIGNIFICANT CHANGE UP (ref 150–400)
POTASSIUM SERPL-MCNC: 4.3 MMOL/L — SIGNIFICANT CHANGE UP (ref 3.5–5.3)
POTASSIUM SERPL-SCNC: 4.3 MMOL/L — SIGNIFICANT CHANGE UP (ref 3.5–5.3)
RBC # BLD: 3.56 M/UL — LOW (ref 3.8–5.2)
RBC # FLD: 15.1 % — HIGH (ref 10.3–14.5)
SODIUM SERPL-SCNC: 139 MMOL/L — SIGNIFICANT CHANGE UP (ref 135–145)
WBC # BLD: 7.1 K/UL — SIGNIFICANT CHANGE UP (ref 3.8–10.5)
WBC # FLD AUTO: 7.1 K/UL — SIGNIFICANT CHANGE UP (ref 3.8–10.5)

## 2018-02-17 RX ORDER — HYDRALAZINE HCL 50 MG
10 TABLET ORAL EVERY 8 HOURS
Qty: 0 | Refills: 0 | Status: DISCONTINUED | OUTPATIENT
Start: 2018-02-17 | End: 2018-02-18

## 2018-02-17 RX ADMIN — Medication 1 DROP(S): at 21:18

## 2018-02-17 RX ADMIN — VALSARTAN 40 MILLIGRAM(S): 80 TABLET ORAL at 17:58

## 2018-02-17 RX ADMIN — ISOSORBIDE DINITRATE 20 MILLIGRAM(S): 5 TABLET ORAL at 06:59

## 2018-02-17 RX ADMIN — Medication 7 UNIT(S): at 12:20

## 2018-02-17 RX ADMIN — Medication 7 UNIT(S): at 16:55

## 2018-02-17 RX ADMIN — INSULIN GLARGINE 32 UNIT(S): 100 INJECTION, SOLUTION SUBCUTANEOUS at 21:25

## 2018-02-17 RX ADMIN — SPIRONOLACTONE 25 MILLIGRAM(S): 25 TABLET, FILM COATED ORAL at 06:58

## 2018-02-17 RX ADMIN — Medication 10 MILLIGRAM(S): at 21:25

## 2018-02-17 RX ADMIN — Medication 10 MILLIEQUIVALENT(S): at 12:21

## 2018-02-17 RX ADMIN — Medication 50 MILLIGRAM(S): at 06:59

## 2018-02-17 RX ADMIN — VALSARTAN 40 MILLIGRAM(S): 80 TABLET ORAL at 06:59

## 2018-02-17 RX ADMIN — ISOSORBIDE DINITRATE 20 MILLIGRAM(S): 5 TABLET ORAL at 21:18

## 2018-02-17 RX ADMIN — HEPARIN SODIUM 5000 UNIT(S): 5000 INJECTION INTRAVENOUS; SUBCUTANEOUS at 21:18

## 2018-02-17 RX ADMIN — Medication 81 MILLIGRAM(S): at 12:21

## 2018-02-17 RX ADMIN — HEPARIN SODIUM 5000 UNIT(S): 5000 INJECTION INTRAVENOUS; SUBCUTANEOUS at 06:59

## 2018-02-17 RX ADMIN — HEPARIN SODIUM 5000 UNIT(S): 5000 INJECTION INTRAVENOUS; SUBCUTANEOUS at 13:20

## 2018-02-17 RX ADMIN — Medication 20 MILLIGRAM(S): at 06:58

## 2018-02-17 RX ADMIN — ATORVASTATIN CALCIUM 40 MILLIGRAM(S): 80 TABLET, FILM COATED ORAL at 21:17

## 2018-02-17 RX ADMIN — Medication 25 MILLIGRAM(S): at 06:58

## 2018-02-17 RX ADMIN — Medication 7 UNIT(S): at 08:10

## 2018-02-17 NOTE — PROGRESS NOTE ADULT - SUBJECTIVE AND OBJECTIVE BOX
Patient is a 72y old  Female who presents with a chief complaint of The patient is a 72y Female was sent by nephrologist for low BP. (16 Feb 2018 16:39)      SUBJECTIVE / OVERNIGHT EVENTS:   Feels better.  Denies CP/SOB/Palpitation/HA.    MEDICATIONS  (STANDING):  aspirin enteric coated 81 milliGRAM(s) Oral daily  atorvastatin 40 milliGRAM(s) Oral at bedtime  dextrose 5%. 1000 milliLiter(s) (50 mL/Hr) IV Continuous <Continuous>  dextrose 50% Injectable 12.5 Gram(s) IV Push once  dextrose 50% Injectable 25 Gram(s) IV Push once  dextrose 50% Injectable 25 Gram(s) IV Push once  docusate sodium 100 milliGRAM(s) Oral two times a day  furosemide    Tablet 20 milliGRAM(s) Oral daily  heparin  Injectable 5000 Unit(s) SubCutaneous every 8 hours  hydrALAZINE 10 milliGRAM(s) Oral every 8 hours  insulin glargine Injectable (LANTUS) 32 Unit(s) SubCutaneous at bedtime  insulin lispro (HumaLOG) corrective regimen sliding scale   SubCutaneous three times a day before meals  insulin lispro Injectable (HumaLOG) 7 Unit(s) SubCutaneous three times a day before meals  isosorbide   dinitrate Tablet (ISORDIL) 20 milliGRAM(s) Oral three times a day  ketorolac 0.5% Ophthalmic Solution 1 Drop(s) Both EYES at bedtime  metoprolol succinate ER 50 milliGRAM(s) Oral daily  potassium chloride    Tablet ER 10 milliEquivalent(s) Oral daily  spironolactone 25 milliGRAM(s) Oral daily  valsartan 40 milliGRAM(s) Oral two times a day    MEDICATIONS  (PRN):  dextrose Gel 1 Dose(s) Oral once PRN Blood Glucose LESS THAN 70 milliGRAM(s)/deciliter  glucagon  Injectable 1 milliGRAM(s) IntraMuscular once PRN Glucose LESS THAN 70 milligrams/deciliter        CAPILLARY BLOOD GLUCOSE      POCT Blood Glucose.: 142 mg/dL (17 Feb 2018 20:58)  POCT Blood Glucose.: 124 mg/dL (17 Feb 2018 16:53)  POCT Blood Glucose.: 105 mg/dL (17 Feb 2018 11:46)  POCT Blood Glucose.: 95 mg/dL (17 Feb 2018 07:51)    I&O's Summary    16 Feb 2018 07:01  -  17 Feb 2018 07:00  --------------------------------------------------------  IN: 840 mL / OUT: 0 mL / NET: 840 mL    17 Feb 2018 07:01  -  17 Feb 2018 23:46  --------------------------------------------------------  IN: 720 mL / OUT: 0 mL / NET: 720 mL        PHYSICAL EXAM:  GENERAL: NAD, well-developed  HEAD:  Atraumatic, Normocephalic  NECK: Supple, No JVD  CHEST/LUNG: Clear to auscultation bilaterally; No wheezing.  HEART: Regular rate and rhythm; No murmurs, rubs, or gallops  ABDOMEN: Soft, Nontender, Nondistended; Bowel sounds present  EXTREMITIES:   No clubbing, cyanosis, or edema  NEUROLOGY: AAO X 3  SKIN: No rashes    LABS:                        11.1   7.10  )-----------( 171      ( 17 Feb 2018 08:54 )             33.1     02-17    139  |  105  |  30<H>  ----------------------------<  101<H>  4.3   |  21<L>  |  1.38<H>    Ca    9.9      17 Feb 2018 08:54  Phos  3.0     02-17  Mg     1.9     02-17              CAPILLARY BLOOD GLUCOSE      POCT Blood Glucose.: 142 mg/dL (17 Feb 2018 20:58)  POCT Blood Glucose.: 124 mg/dL (17 Feb 2018 16:53)  POCT Blood Glucose.: 105 mg/dL (17 Feb 2018 11:46)  POCT Blood Glucose.: 95 mg/dL (17 Feb 2018 07:51)                RADIOLOGY & ADDITIONAL TESTS:    Imaging Personally Reviewed:    Consultant(s) Notes Reviewed:      Care Discussed with Consultants/Other Providers:

## 2018-02-17 NOTE — PROGRESS NOTE ADULT - ASSESSMENT
The patient is a 72y Female was sent by nephrologist for low BP.    Hypotension:    Decreased hydralazine 10 mg po tid.  Cont current BP meds.      RIKKI:  Likely prerenal  s/p IVF  BMP    DM II:  Lantus/Humalog/FSSS    DC planning tomorrow.

## 2018-02-18 VITALS — DIASTOLIC BLOOD PRESSURE: 71 MMHG | SYSTOLIC BLOOD PRESSURE: 114 MMHG | HEART RATE: 66 BPM

## 2018-02-18 LAB
ANION GAP SERPL CALC-SCNC: 13 MMOL/L — SIGNIFICANT CHANGE UP (ref 5–17)
BUN SERPL-MCNC: 25 MG/DL — HIGH (ref 7–23)
CALCIUM SERPL-MCNC: 9.8 MG/DL — SIGNIFICANT CHANGE UP (ref 8.4–10.5)
CHLORIDE SERPL-SCNC: 106 MMOL/L — SIGNIFICANT CHANGE UP (ref 96–108)
CO2 SERPL-SCNC: 21 MMOL/L — LOW (ref 22–31)
CREAT SERPL-MCNC: 1.23 MG/DL — SIGNIFICANT CHANGE UP (ref 0.5–1.3)
GLUCOSE BLDC GLUCOMTR-MCNC: 101 MG/DL — HIGH (ref 70–99)
GLUCOSE BLDC GLUCOMTR-MCNC: 76 MG/DL — SIGNIFICANT CHANGE UP (ref 70–99)
GLUCOSE BLDC GLUCOMTR-MCNC: 95 MG/DL — SIGNIFICANT CHANGE UP (ref 70–99)
GLUCOSE SERPL-MCNC: 94 MG/DL — SIGNIFICANT CHANGE UP (ref 70–99)
MAGNESIUM SERPL-MCNC: 1.8 MG/DL — SIGNIFICANT CHANGE UP (ref 1.6–2.6)
PHOSPHATE SERPL-MCNC: 3.2 MG/DL — SIGNIFICANT CHANGE UP (ref 2.5–4.5)
POTASSIUM SERPL-MCNC: 4.2 MMOL/L — SIGNIFICANT CHANGE UP (ref 3.5–5.3)
POTASSIUM SERPL-SCNC: 4.2 MMOL/L — SIGNIFICANT CHANGE UP (ref 3.5–5.3)
SODIUM SERPL-SCNC: 140 MMOL/L — SIGNIFICANT CHANGE UP (ref 135–145)

## 2018-02-18 PROCEDURE — 85027 COMPLETE CBC AUTOMATED: CPT

## 2018-02-18 PROCEDURE — 83880 ASSAY OF NATRIURETIC PEPTIDE: CPT

## 2018-02-18 PROCEDURE — 70450 CT HEAD/BRAIN W/O DYE: CPT

## 2018-02-18 PROCEDURE — 83605 ASSAY OF LACTIC ACID: CPT

## 2018-02-18 PROCEDURE — 80048 BASIC METABOLIC PNL TOTAL CA: CPT

## 2018-02-18 PROCEDURE — 82330 ASSAY OF CALCIUM: CPT

## 2018-02-18 PROCEDURE — 81003 URINALYSIS AUTO W/O SCOPE: CPT

## 2018-02-18 PROCEDURE — 85610 PROTHROMBIN TIME: CPT

## 2018-02-18 PROCEDURE — 80053 COMPREHEN METABOLIC PANEL: CPT

## 2018-02-18 PROCEDURE — 84295 ASSAY OF SERUM SODIUM: CPT

## 2018-02-18 PROCEDURE — 82435 ASSAY OF BLOOD CHLORIDE: CPT

## 2018-02-18 PROCEDURE — 82962 GLUCOSE BLOOD TEST: CPT

## 2018-02-18 PROCEDURE — 84132 ASSAY OF SERUM POTASSIUM: CPT

## 2018-02-18 PROCEDURE — 99285 EMERGENCY DEPT VISIT HI MDM: CPT | Mod: 25

## 2018-02-18 PROCEDURE — 84100 ASSAY OF PHOSPHORUS: CPT

## 2018-02-18 PROCEDURE — 93005 ELECTROCARDIOGRAM TRACING: CPT

## 2018-02-18 PROCEDURE — 83036 HEMOGLOBIN GLYCOSYLATED A1C: CPT

## 2018-02-18 PROCEDURE — 85730 THROMBOPLASTIN TIME PARTIAL: CPT

## 2018-02-18 PROCEDURE — 82553 CREATINE MB FRACTION: CPT

## 2018-02-18 PROCEDURE — 84484 ASSAY OF TROPONIN QUANT: CPT

## 2018-02-18 PROCEDURE — 82570 ASSAY OF URINE CREATININE: CPT

## 2018-02-18 PROCEDURE — 71046 X-RAY EXAM CHEST 2 VIEWS: CPT

## 2018-02-18 PROCEDURE — 82436 ASSAY OF URINE CHLORIDE: CPT

## 2018-02-18 PROCEDURE — 97162 PT EVAL MOD COMPLEX 30 MIN: CPT

## 2018-02-18 PROCEDURE — 82803 BLOOD GASES ANY COMBINATION: CPT

## 2018-02-18 PROCEDURE — 82550 ASSAY OF CK (CPK): CPT

## 2018-02-18 PROCEDURE — 83735 ASSAY OF MAGNESIUM: CPT

## 2018-02-18 PROCEDURE — 85014 HEMATOCRIT: CPT

## 2018-02-18 PROCEDURE — 82947 ASSAY GLUCOSE BLOOD QUANT: CPT

## 2018-02-18 PROCEDURE — 84133 ASSAY OF URINE POTASSIUM: CPT

## 2018-02-18 PROCEDURE — 84300 ASSAY OF URINE SODIUM: CPT

## 2018-02-18 RX ORDER — SPIRONOLACTONE 25 MG/1
1 TABLET, FILM COATED ORAL
Qty: 30 | Refills: 0
Start: 2018-02-18 | End: 2018-03-19

## 2018-02-18 RX ORDER — HYDRALAZINE HCL 50 MG
1 TABLET ORAL
Qty: 0 | Refills: 0 | COMMUNITY

## 2018-02-18 RX ORDER — ISOSORBIDE DINITRATE 5 MG/1
1 TABLET ORAL
Qty: 90 | Refills: 0
Start: 2018-02-18 | End: 2018-03-19

## 2018-02-18 RX ORDER — METOPROLOL TARTRATE 50 MG
1 TABLET ORAL
Qty: 0 | Refills: 0 | COMMUNITY
Start: 2018-02-18

## 2018-02-18 RX ORDER — METOPROLOL TARTRATE 50 MG
1 TABLET ORAL
Qty: 30 | Refills: 0
Start: 2018-02-18 | End: 2018-03-19

## 2018-02-18 RX ORDER — ASPIRIN/CALCIUM CARB/MAGNESIUM 324 MG
1 TABLET ORAL
Qty: 0 | Refills: 0 | DISCHARGE
Start: 2018-02-18

## 2018-02-18 RX ORDER — ISOSORBIDE DINITRATE 5 MG/1
1 TABLET ORAL
Qty: 0 | Refills: 0 | COMMUNITY

## 2018-02-18 RX ORDER — DOCUSATE SODIUM 100 MG
1 CAPSULE ORAL
Qty: 0 | Refills: 0 | COMMUNITY

## 2018-02-18 RX ORDER — SPIRONOLACTONE 25 MG/1
1 TABLET, FILM COATED ORAL
Qty: 0 | Refills: 0 | COMMUNITY

## 2018-02-18 RX ORDER — HYDRALAZINE HCL 50 MG
1 TABLET ORAL
Qty: 90 | Refills: 0
Start: 2018-02-18 | End: 2018-03-19

## 2018-02-18 RX ORDER — VALSARTAN 80 MG/1
1 TABLET ORAL
Qty: 0 | Refills: 0 | COMMUNITY
Start: 2018-02-18

## 2018-02-18 RX ORDER — FUROSEMIDE 40 MG
1 TABLET ORAL
Qty: 0 | Refills: 0 | COMMUNITY
Start: 2018-02-18

## 2018-02-18 RX ORDER — DOCUSATE SODIUM 100 MG
1 CAPSULE ORAL
Qty: 0 | Refills: 0 | DISCHARGE
Start: 2018-02-18

## 2018-02-18 RX ORDER — METOPROLOL TARTRATE 50 MG
1 TABLET ORAL
Qty: 0 | Refills: 0 | COMMUNITY

## 2018-02-18 RX ORDER — ISOSORBIDE DINITRATE 5 MG/1
1 TABLET ORAL
Qty: 0 | Refills: 0 | COMMUNITY
Start: 2018-02-18

## 2018-02-18 RX ORDER — FUROSEMIDE 40 MG
1 TABLET ORAL
Qty: 30 | Refills: 0
Start: 2018-02-18 | End: 2018-03-19

## 2018-02-18 RX ORDER — VALSARTAN 80 MG/1
1 TABLET ORAL
Qty: 60 | Refills: 0
Start: 2018-02-18 | End: 2018-03-19

## 2018-02-18 RX ORDER — HYDRALAZINE HCL 50 MG
1 TABLET ORAL
Qty: 0 | Refills: 0 | COMMUNITY
Start: 2018-02-18

## 2018-02-18 RX ORDER — ASPIRIN/CALCIUM CARB/MAGNESIUM 324 MG
1 TABLET ORAL
Qty: 30 | Refills: 0
Start: 2018-02-18 | End: 2018-03-19

## 2018-02-18 RX ORDER — ASPIRIN/CALCIUM CARB/MAGNESIUM 324 MG
1 TABLET ORAL
Qty: 0 | Refills: 0 | COMMUNITY

## 2018-02-18 RX ORDER — FUROSEMIDE 40 MG
1 TABLET ORAL
Qty: 0 | Refills: 0 | COMMUNITY

## 2018-02-18 RX ADMIN — Medication 7 UNIT(S): at 08:26

## 2018-02-18 RX ADMIN — ISOSORBIDE DINITRATE 20 MILLIGRAM(S): 5 TABLET ORAL at 14:26

## 2018-02-18 RX ADMIN — Medication 81 MILLIGRAM(S): at 12:14

## 2018-02-18 RX ADMIN — Medication 10 MILLIGRAM(S): at 14:26

## 2018-02-18 RX ADMIN — VALSARTAN 40 MILLIGRAM(S): 80 TABLET ORAL at 05:12

## 2018-02-18 RX ADMIN — SPIRONOLACTONE 25 MILLIGRAM(S): 25 TABLET, FILM COATED ORAL at 05:12

## 2018-02-18 RX ADMIN — Medication 20 MILLIGRAM(S): at 05:12

## 2018-02-18 RX ADMIN — Medication 50 MILLIGRAM(S): at 05:11

## 2018-02-18 RX ADMIN — Medication 10 MILLIEQUIVALENT(S): at 12:14

## 2018-02-18 RX ADMIN — Medication 10 MILLIGRAM(S): at 05:12

## 2018-02-18 RX ADMIN — HEPARIN SODIUM 5000 UNIT(S): 5000 INJECTION INTRAVENOUS; SUBCUTANEOUS at 05:12

## 2018-02-18 RX ADMIN — Medication 7 UNIT(S): at 12:14

## 2018-02-18 RX ADMIN — VALSARTAN 40 MILLIGRAM(S): 80 TABLET ORAL at 17:08

## 2018-02-18 RX ADMIN — Medication 100 MILLIGRAM(S): at 05:12

## 2018-02-18 RX ADMIN — ISOSORBIDE DINITRATE 20 MILLIGRAM(S): 5 TABLET ORAL at 05:12

## 2018-02-18 RX ADMIN — Medication 7 UNIT(S): at 17:08

## 2018-02-18 RX ADMIN — HEPARIN SODIUM 5000 UNIT(S): 5000 INJECTION INTRAVENOUS; SUBCUTANEOUS at 14:26

## 2018-02-18 NOTE — PROGRESS NOTE ADULT - SUBJECTIVE AND OBJECTIVE BOX
Patient is a 72y old  Female who presents with a chief complaint of sent by nephrologist for low Blood Pressure. (16 Feb 2018 16:39)      SUBJECTIVE / OVERNIGHT EVENTS:   Feels better.  Denies CP/SOB/Palpitation/HA.    MEDICATIONS  (STANDING):  aspirin enteric coated 81 milliGRAM(s) Oral daily  atorvastatin 40 milliGRAM(s) Oral at bedtime  dextrose 5%. 1000 milliLiter(s) (50 mL/Hr) IV Continuous <Continuous>  dextrose 50% Injectable 12.5 Gram(s) IV Push once  dextrose 50% Injectable 25 Gram(s) IV Push once  dextrose 50% Injectable 25 Gram(s) IV Push once  docusate sodium 100 milliGRAM(s) Oral two times a day  furosemide    Tablet 20 milliGRAM(s) Oral daily  heparin  Injectable 5000 Unit(s) SubCutaneous every 8 hours  hydrALAZINE 10 milliGRAM(s) Oral every 8 hours  insulin glargine Injectable (LANTUS) 32 Unit(s) SubCutaneous at bedtime  insulin lispro (HumaLOG) corrective regimen sliding scale   SubCutaneous three times a day before meals  insulin lispro Injectable (HumaLOG) 7 Unit(s) SubCutaneous three times a day before meals  isosorbide   dinitrate Tablet (ISORDIL) 20 milliGRAM(s) Oral three times a day  ketorolac 0.5% Ophthalmic Solution 1 Drop(s) Both EYES at bedtime  metoprolol succinate ER 50 milliGRAM(s) Oral daily  potassium chloride    Tablet ER 10 milliEquivalent(s) Oral daily  spironolactone 25 milliGRAM(s) Oral daily  valsartan 40 milliGRAM(s) Oral two times a day    MEDICATIONS  (PRN):  dextrose Gel 1 Dose(s) Oral once PRN Blood Glucose LESS THAN 70 milliGRAM(s)/deciliter  glucagon  Injectable 1 milliGRAM(s) IntraMuscular once PRN Glucose LESS THAN 70 milligrams/deciliter        CAPILLARY BLOOD GLUCOSE      POCT Blood Glucose.: 95 mg/dL (18 Feb 2018 16:35)  POCT Blood Glucose.: 101 mg/dL (18 Feb 2018 11:56)  POCT Blood Glucose.: 76 mg/dL (18 Feb 2018 07:56)    I&O's Summary    17 Feb 2018 07:01  -  18 Feb 2018 07:00  --------------------------------------------------------  IN: 1120 mL / OUT: 0 mL / NET: 1120 mL    18 Feb 2018 07:01  -  18 Feb 2018 21:40  --------------------------------------------------------  IN: 1020 mL / OUT: 0 mL / NET: 1020 mL        PHYSICAL EXAM:  GENERAL: NAD, well-developed  HEAD:  Atraumatic, Normocephalic  NECK: Supple, No JVD  CHEST/LUNG: Clear to auscultation bilaterally; No wheezing.  HEART: Regular rate and rhythm; No murmurs, rubs, or gallops  ABDOMEN: Soft, Nontender, Nondistended; Bowel sounds present  EXTREMITIES:   No clubbing, cyanosis, or edema  NEUROLOGY: AAO X 3  SKIN: No rashes    LABS:                        11.1   7.10  )-----------( 171      ( 17 Feb 2018 08:54 )             33.1     02-18    140  |  106  |  25<H>  ----------------------------<  94  4.2   |  21<L>  |  1.23    Ca    9.8      18 Feb 2018 06:34  Phos  3.2     02-18  Mg     1.8     02-18              CAPILLARY BLOOD GLUCOSE      POCT Blood Glucose.: 95 mg/dL (18 Feb 2018 16:35)  POCT Blood Glucose.: 101 mg/dL (18 Feb 2018 11:56)  POCT Blood Glucose.: 76 mg/dL (18 Feb 2018 07:56)                RADIOLOGY & ADDITIONAL TESTS:    Imaging Personally Reviewed:    Consultant(s) Notes Reviewed:      Care Discussed with Consultants/Other Providers:

## 2018-02-18 NOTE — PROVIDER CONTACT NOTE (OTHER) - ASSESSMENT
pt felt dizzy, bp low
Pt A&Ox3, B/P 104/71, HR 62. No c/o dizziness, CP, SOB or discomfort. Pt due for Lasix, aldactone, valsartan, hydralazine & isordil, none of which have parameters. Which medications should be given?
Pt a&ox3, no c/o of chest pain sob or palpitations. Pt c/o of dizziness at baseline
Pt no c/o SOB, CP or palps
pt A&Ox3, vss, B/P 109/67. Pt due for Hydralazine 25mg & Isordil 20mg. Medications do not have parameters. Pt asymptomatic. Some medications held throughout the day. Should B/P medications be held?

## 2018-02-18 NOTE — CHART NOTE - NSCHARTNOTEFT_GEN_A_CORE
Asked to complete discharge paperwork on this patient by Dr Díaz - to be discharged home today  Discharge plan and medications confirmed with Dr Díaz  Discharge completed as directed  Vital signs stable, Afebrile, No acute distress

## 2018-02-18 NOTE — PROGRESS NOTE ADULT - ASSESSMENT
The patient is a 72y Female was sent by nephrologist for low BP.    Hypotension:    BP stable.  Cont current BP meds.      RIKKI:  Likely prerenal      DM II:  Lantus/Humalog/FSSS    DC planning.

## 2018-02-18 NOTE — PROVIDER CONTACT NOTE (OTHER) - ACTION/TREATMENT ORDERED:
NP aware, hold hydralazine,  no new orders at this time
NP notified, administer Isordil as ordered. Hydralazine held. Lasix, aldactone & valsartan moved to 9 am. Will reevaluate later this AM. Will continue to monitor
NP notified, will hold hydralazine & isordil as per NP. Will continue to monitor
bp came up , pt lying in bed
provider ordered: VS, and draw labs in the AM to see electrolyte levels

## 2018-02-18 NOTE — CHART NOTE - NSCHARTNOTEFT_GEN_A_CORE
2/17/18 :- Medicine NP :- Notified by RN that pt had 13 beats of WC. Pt asymptomatic. Vitals stable.  -Continue Telemonitoring  -Labs for am odered  -Continue B-Blocker, Metoprolol 50mg xl.  Will f/u Primary in am

## 2018-02-20 ENCOUNTER — RX RENEWAL (OUTPATIENT)
Age: 73
End: 2018-02-20

## 2018-02-21 PROBLEM — I10 ESSENTIAL (PRIMARY) HYPERTENSION: Chronic | Status: ACTIVE | Noted: 2018-02-13

## 2018-03-08 ENCOUNTER — APPOINTMENT (OUTPATIENT)
Dept: NEPHROLOGY | Facility: CLINIC | Age: 73
End: 2018-03-08
Payer: MEDICARE

## 2018-03-08 VITALS
SYSTOLIC BLOOD PRESSURE: 114 MMHG | BODY MASS INDEX: 28.98 KG/M2 | DIASTOLIC BLOOD PRESSURE: 63 MMHG | HEART RATE: 71 BPM | HEIGHT: 64 IN | OXYGEN SATURATION: 98 % | WEIGHT: 169.75 LBS

## 2018-03-08 PROCEDURE — 99214 OFFICE O/P EST MOD 30 MIN: CPT | Mod: GC

## 2018-03-08 RX ORDER — VALSARTAN AND HYDROCHLOROTHIAZIDE 160; 12.5 MG/1; MG/1
160-12.5 TABLET, FILM COATED ORAL DAILY
Refills: 0 | Status: DISCONTINUED | COMMUNITY
Start: 2017-01-26 | End: 2018-03-08

## 2018-03-09 ENCOUNTER — APPOINTMENT (OUTPATIENT)
Dept: ENDOCRINOLOGY | Facility: CLINIC | Age: 73
End: 2018-03-09

## 2018-03-09 LAB
ALBUMIN SERPL ELPH-MCNC: 4.2 G/DL
ANION GAP SERPL CALC-SCNC: 13 MMOL/L
APPEARANCE: CLEAR
BACTERIA: ABNORMAL
BASOPHILS # BLD AUTO: 0.02 K/UL
BASOPHILS NFR BLD AUTO: 0.3 %
BILIRUBIN URINE: NEGATIVE
BLOOD URINE: NEGATIVE
BUN SERPL-MCNC: 36 MG/DL
CALCIUM SERPL-MCNC: 10.2 MG/DL
CHLORIDE SERPL-SCNC: 104 MMOL/L
CO2 SERPL-SCNC: 23 MMOL/L
COLOR: YELLOW
CREAT SERPL-MCNC: 1.59 MG/DL
EOSINOPHIL # BLD AUTO: 0.07 K/UL
EOSINOPHIL NFR BLD AUTO: 0.9 %
GLUCOSE QUALITATIVE U: NEGATIVE MG/DL
GLUCOSE SERPL-MCNC: 140 MG/DL
HCT VFR BLD CALC: 37.9 %
HGB BLD-MCNC: 12.3 G/DL
HYALINE CASTS: 3 /LPF
IMM GRANULOCYTES NFR BLD AUTO: 0.3 %
KETONES URINE: NEGATIVE
LEUKOCYTE ESTERASE URINE: NEGATIVE
LYMPHOCYTES # BLD AUTO: 2.3 K/UL
LYMPHOCYTES NFR BLD AUTO: 30.1 %
MAN DIFF?: NORMAL
MCHC RBC-ENTMCNC: 31.5 PG
MCHC RBC-ENTMCNC: 32.5 GM/DL
MCV RBC AUTO: 96.9 FL
MICROSCOPIC-UA: NORMAL
MONOCYTES # BLD AUTO: 0.64 K/UL
MONOCYTES NFR BLD AUTO: 8.4 %
NEUTROPHILS # BLD AUTO: 4.58 K/UL
NEUTROPHILS NFR BLD AUTO: 60 %
NITRITE URINE: NEGATIVE
PH URINE: 5
PHOSPHATE SERPL-MCNC: 3.6 MG/DL
PLATELET # BLD AUTO: 208 K/UL
POTASSIUM SERPL-SCNC: 5.1 MMOL/L
PROTEIN URINE: NEGATIVE MG/DL
RBC # BLD: 3.91 M/UL
RBC # FLD: 15.4 %
RED BLOOD CELLS URINE: 1 /HPF
SODIUM SERPL-SCNC: 140 MMOL/L
SPECIFIC GRAVITY URINE: 1.02
SQUAMOUS EPITHELIAL CELLS: 2 /HPF
UROBILINOGEN URINE: NEGATIVE MG/DL
WBC # FLD AUTO: 7.63 K/UL
WHITE BLOOD CELLS URINE: 3 /HPF

## 2018-03-13 ENCOUNTER — APPOINTMENT (OUTPATIENT)
Dept: NEUROLOGY | Facility: CLINIC | Age: 73
End: 2018-03-13

## 2018-03-23 ENCOUNTER — NON-APPOINTMENT (OUTPATIENT)
Age: 73
End: 2018-03-23

## 2018-03-23 ENCOUNTER — APPOINTMENT (OUTPATIENT)
Dept: CARDIOLOGY | Facility: CLINIC | Age: 73
End: 2018-03-23
Payer: MEDICARE

## 2018-03-23 VITALS
OXYGEN SATURATION: 97 % | WEIGHT: 163 LBS | HEART RATE: 77 BPM | SYSTOLIC BLOOD PRESSURE: 108 MMHG | HEIGHT: 64 IN | BODY MASS INDEX: 27.83 KG/M2 | DIASTOLIC BLOOD PRESSURE: 67 MMHG | RESPIRATION RATE: 16 BRPM

## 2018-03-23 PROCEDURE — 99215 OFFICE O/P EST HI 40 MIN: CPT

## 2018-03-23 PROCEDURE — 36415 COLL VENOUS BLD VENIPUNCTURE: CPT

## 2018-03-23 PROCEDURE — 93000 ELECTROCARDIOGRAM COMPLETE: CPT

## 2018-03-25 LAB
ALBUMIN SERPL ELPH-MCNC: 4 G/DL
ALP BLD-CCNC: 54 U/L
ALT SERPL-CCNC: 16 U/L
ANION GAP SERPL CALC-SCNC: 15 MMOL/L
AST SERPL-CCNC: 24 U/L
BILIRUB SERPL-MCNC: 0.4 MG/DL
BUN SERPL-MCNC: 25 MG/DL
CALCIUM SERPL-MCNC: 10.4 MG/DL
CHLORIDE SERPL-SCNC: 104 MMOL/L
CHOLEST SERPL-MCNC: 176 MG/DL
CHOLEST/HDLC SERPL: 3.8 RATIO
CO2 SERPL-SCNC: 23 MMOL/L
CREAT SERPL-MCNC: 1.34 MG/DL
GLUCOSE SERPL-MCNC: 94 MG/DL
HDLC SERPL-MCNC: 46 MG/DL
LDLC SERPL CALC-MCNC: 102 MG/DL
NT-PROBNP SERPL-MCNC: 165 PG/ML
POTASSIUM SERPL-SCNC: 4.6 MMOL/L
PROT SERPL-MCNC: 8.2 G/DL
SODIUM SERPL-SCNC: 142 MMOL/L
TRIGL SERPL-MCNC: 140 MG/DL
TSH SERPL-ACNC: 1.99 UIU/ML

## 2018-03-26 ENCOUNTER — APPOINTMENT (OUTPATIENT)
Dept: NEUROLOGY | Facility: CLINIC | Age: 73
End: 2018-03-26

## 2018-04-02 ENCOUNTER — NON-APPOINTMENT (OUTPATIENT)
Age: 73
End: 2018-04-02

## 2018-04-02 ENCOUNTER — LABORATORY RESULT (OUTPATIENT)
Age: 73
End: 2018-04-02

## 2018-04-02 ENCOUNTER — APPOINTMENT (OUTPATIENT)
Dept: INTERNAL MEDICINE | Facility: CLINIC | Age: 73
End: 2018-04-02
Payer: MEDICARE

## 2018-04-02 VITALS — SYSTOLIC BLOOD PRESSURE: 120 MMHG | DIASTOLIC BLOOD PRESSURE: 60 MMHG

## 2018-04-02 LAB
BILIRUB UR QL STRIP: NORMAL
CLARITY UR: CLEAR
COLLECTION METHOD: NORMAL
GLUCOSE UR-MCNC: NORMAL
HCG UR QL: 0.2 EU/DL
HGB UR QL STRIP.AUTO: NORMAL
KETONES UR-MCNC: NORMAL
LEUKOCYTE ESTERASE UR QL STRIP: NORMAL
NITRITE UR QL STRIP: NORMAL
PH UR STRIP: 5
PROT UR STRIP-MCNC: NORMAL
SP GR UR STRIP: 1.01

## 2018-04-02 PROCEDURE — 99204 OFFICE O/P NEW MOD 45 MIN: CPT | Mod: 25

## 2018-04-02 PROCEDURE — 81003 URINALYSIS AUTO W/O SCOPE: CPT | Mod: QW

## 2018-04-02 PROCEDURE — 90670 PCV13 VACCINE IM: CPT

## 2018-04-02 PROCEDURE — 36415 COLL VENOUS BLD VENIPUNCTURE: CPT

## 2018-04-02 PROCEDURE — G0009: CPT

## 2018-04-02 PROCEDURE — 93000 ELECTROCARDIOGRAM COMPLETE: CPT

## 2018-04-03 LAB
25(OH)D3 SERPL-MCNC: 29.8 NG/ML
ALBUMIN SERPL ELPH-MCNC: 4.1 G/DL
ALP BLD-CCNC: 52 U/L
ALT SERPL-CCNC: 12 U/L
ANION GAP SERPL CALC-SCNC: 14 MMOL/L
AST SERPL-CCNC: 26 U/L
BASOPHILS # BLD AUTO: 0.03 K/UL
BASOPHILS NFR BLD AUTO: 0.5 %
BILIRUB SERPL-MCNC: 0.4 MG/DL
BUN SERPL-MCNC: 32 MG/DL
CALCIUM SERPL-MCNC: 10.2 MG/DL
CHLORIDE SERPL-SCNC: 104 MMOL/L
CHOLEST SERPL-MCNC: 176 MG/DL
CHOLEST/HDLC SERPL: 3.9 RATIO
CO2 SERPL-SCNC: 21 MMOL/L
CREAT SERPL-MCNC: 1.45 MG/DL
EOSINOPHIL # BLD AUTO: 0.13 K/UL
EOSINOPHIL NFR BLD AUTO: 2.2 %
FERRITIN SERPL-MCNC: 122 NG/ML
GLUCOSE SERPL-MCNC: 117 MG/DL
HBA1C MFR BLD HPLC: 6.2 %
HCT VFR BLD CALC: 38.8 %
HDLC SERPL-MCNC: 45 MG/DL
HGB BLD-MCNC: 12.8 G/DL
IMM GRANULOCYTES NFR BLD AUTO: 0.2 %
LDLC SERPL CALC-MCNC: 111 MG/DL
LYMPHOCYTES # BLD AUTO: 1.48 K/UL
LYMPHOCYTES NFR BLD AUTO: 25.3 %
MAN DIFF?: NORMAL
MCHC RBC-ENTMCNC: 31.5 PG
MCHC RBC-ENTMCNC: 33 GM/DL
MCV RBC AUTO: 95.6 FL
MONOCYTES # BLD AUTO: 0.67 K/UL
MONOCYTES NFR BLD AUTO: 11.5 %
NEUTROPHILS # BLD AUTO: 3.52 K/UL
NEUTROPHILS NFR BLD AUTO: 60.3 %
PLATELET # BLD AUTO: 213 K/UL
POTASSIUM SERPL-SCNC: 4.1 MMOL/L
PROT SERPL-MCNC: 8.1 G/DL
RBC # BLD: 4.06 M/UL
RBC # FLD: 14.8 %
SAVE SPECIMEN: NORMAL
SODIUM SERPL-SCNC: 139 MMOL/L
T3RU NFR SERPL: 1.01 INDEX
T4 SERPL-MCNC: 5.3 UG/DL
TRIGL SERPL-MCNC: 98 MG/DL
TSH SERPL-ACNC: 1.82 UIU/ML
URATE SERPL-MCNC: 6.8 MG/DL
VIT B12 SERPL-MCNC: 1141 PG/ML
WBC # FLD AUTO: 5.84 K/UL

## 2018-04-05 ENCOUNTER — APPOINTMENT (OUTPATIENT)
Dept: INTERNAL MEDICINE | Facility: CLINIC | Age: 73
End: 2018-04-05

## 2018-04-20 ENCOUNTER — APPOINTMENT (OUTPATIENT)
Dept: CV DIAGNOSITCS | Facility: HOSPITAL | Age: 73
End: 2018-04-20

## 2018-04-23 ENCOUNTER — APPOINTMENT (OUTPATIENT)
Dept: ENDOCRINOLOGY | Facility: CLINIC | Age: 73
End: 2018-04-23
Payer: MEDICARE

## 2018-04-23 VITALS
OXYGEN SATURATION: 98 % | BODY MASS INDEX: 28.34 KG/M2 | DIASTOLIC BLOOD PRESSURE: 68 MMHG | HEIGHT: 64 IN | SYSTOLIC BLOOD PRESSURE: 122 MMHG | HEART RATE: 71 BPM | WEIGHT: 166 LBS

## 2018-04-23 PROCEDURE — 99214 OFFICE O/P EST MOD 30 MIN: CPT

## 2018-04-23 RX ORDER — COLD-HOT PACK
EACH MISCELLANEOUS
Qty: 5 | Refills: 0 | Status: ACTIVE | COMMUNITY
Start: 2018-04-23 | End: 1900-01-01

## 2018-04-26 ENCOUNTER — APPOINTMENT (OUTPATIENT)
Dept: NEUROLOGY | Facility: CLINIC | Age: 73
End: 2018-04-26
Payer: MEDICARE

## 2018-04-26 VITALS
BODY MASS INDEX: 28.34 KG/M2 | SYSTOLIC BLOOD PRESSURE: 116 MMHG | HEART RATE: 71 BPM | WEIGHT: 166 LBS | DIASTOLIC BLOOD PRESSURE: 66 MMHG | HEIGHT: 64 IN

## 2018-04-26 PROCEDURE — 99213 OFFICE O/P EST LOW 20 MIN: CPT

## 2018-05-03 ENCOUNTER — FORM ENCOUNTER (OUTPATIENT)
Age: 73
End: 2018-05-03

## 2018-05-04 ENCOUNTER — APPOINTMENT (OUTPATIENT)
Dept: RADIOLOGY | Facility: CLINIC | Age: 73
End: 2018-05-04

## 2018-05-04 ENCOUNTER — APPOINTMENT (OUTPATIENT)
Dept: MAMMOGRAPHY | Facility: CLINIC | Age: 73
End: 2018-05-04

## 2018-05-04 ENCOUNTER — OUTPATIENT (OUTPATIENT)
Dept: OUTPATIENT SERVICES | Facility: HOSPITAL | Age: 73
LOS: 1 days | End: 2018-05-04
Payer: MEDICARE

## 2018-05-04 ENCOUNTER — APPOINTMENT (OUTPATIENT)
Dept: RADIOLOGY | Facility: IMAGING CENTER | Age: 73
End: 2018-05-04
Payer: MEDICARE

## 2018-05-04 ENCOUNTER — APPOINTMENT (OUTPATIENT)
Dept: MAMMOGRAPHY | Facility: IMAGING CENTER | Age: 73
End: 2018-05-04
Payer: MEDICARE

## 2018-05-04 DIAGNOSIS — Z95.810 PRESENCE OF AUTOMATIC (IMPLANTABLE) CARDIAC DEFIBRILLATOR: Chronic | ICD-10-CM

## 2018-05-04 DIAGNOSIS — Z95.5 PRESENCE OF CORONARY ANGIOPLASTY IMPLANT AND GRAFT: Chronic | ICD-10-CM

## 2018-05-04 DIAGNOSIS — Z00.00 ENCOUNTER FOR GENERAL ADULT MEDICAL EXAMINATION WITHOUT ABNORMAL FINDINGS: ICD-10-CM

## 2018-05-04 DIAGNOSIS — Z95.0 PRESENCE OF CARDIAC PACEMAKER: Chronic | ICD-10-CM

## 2018-05-04 PROCEDURE — 77063 BREAST TOMOSYNTHESIS BI: CPT

## 2018-05-04 PROCEDURE — 77067 SCR MAMMO BI INCL CAD: CPT

## 2018-05-04 PROCEDURE — 77067 SCR MAMMO BI INCL CAD: CPT | Mod: 26

## 2018-05-04 PROCEDURE — 77080 DXA BONE DENSITY AXIAL: CPT | Mod: 26

## 2018-05-04 PROCEDURE — 77063 BREAST TOMOSYNTHESIS BI: CPT | Mod: 26

## 2018-05-04 PROCEDURE — 77080 DXA BONE DENSITY AXIAL: CPT

## 2018-05-05 ENCOUNTER — OTHER (OUTPATIENT)
Age: 73
End: 2018-05-05

## 2018-05-10 ENCOUNTER — APPOINTMENT (OUTPATIENT)
Dept: CV DIAGNOSITCS | Facility: HOSPITAL | Age: 73
End: 2018-05-10

## 2018-05-11 ENCOUNTER — NON-APPOINTMENT (OUTPATIENT)
Age: 73
End: 2018-05-11

## 2018-05-11 ENCOUNTER — APPOINTMENT (OUTPATIENT)
Dept: CARDIOLOGY | Facility: CLINIC | Age: 73
End: 2018-05-11
Payer: MEDICARE

## 2018-05-11 ENCOUNTER — FORM ENCOUNTER (OUTPATIENT)
Age: 73
End: 2018-05-11

## 2018-05-11 VITALS
RESPIRATION RATE: 16 BRPM | OXYGEN SATURATION: 94 % | BODY MASS INDEX: 27.83 KG/M2 | DIASTOLIC BLOOD PRESSURE: 66 MMHG | HEIGHT: 64 IN | HEART RATE: 69 BPM | SYSTOLIC BLOOD PRESSURE: 100 MMHG | WEIGHT: 163 LBS

## 2018-05-11 PROCEDURE — 99215 OFFICE O/P EST HI 40 MIN: CPT

## 2018-05-11 PROCEDURE — 93000 ELECTROCARDIOGRAM COMPLETE: CPT

## 2018-05-12 ENCOUNTER — OUTPATIENT (OUTPATIENT)
Dept: OUTPATIENT SERVICES | Facility: HOSPITAL | Age: 73
LOS: 1 days | End: 2018-05-12
Payer: MEDICARE

## 2018-05-12 ENCOUNTER — APPOINTMENT (OUTPATIENT)
Dept: CT IMAGING | Facility: IMAGING CENTER | Age: 73
End: 2018-05-12
Payer: MEDICARE

## 2018-05-12 DIAGNOSIS — Z95.810 PRESENCE OF AUTOMATIC (IMPLANTABLE) CARDIAC DEFIBRILLATOR: Chronic | ICD-10-CM

## 2018-05-12 DIAGNOSIS — G95.9 DISEASE OF SPINAL CORD, UNSPECIFIED: ICD-10-CM

## 2018-05-12 DIAGNOSIS — Z95.0 PRESENCE OF CARDIAC PACEMAKER: Chronic | ICD-10-CM

## 2018-05-12 DIAGNOSIS — Z95.5 PRESENCE OF CORONARY ANGIOPLASTY IMPLANT AND GRAFT: Chronic | ICD-10-CM

## 2018-05-12 PROCEDURE — 72125 CT NECK SPINE W/O DYE: CPT | Mod: 26

## 2018-05-12 PROCEDURE — 72125 CT NECK SPINE W/O DYE: CPT

## 2018-05-16 ENCOUNTER — RX RENEWAL (OUTPATIENT)
Age: 73
End: 2018-05-16

## 2018-06-13 ENCOUNTER — APPOINTMENT (OUTPATIENT)
Dept: NEPHROLOGY | Facility: CLINIC | Age: 73
End: 2018-06-13

## 2018-06-28 ENCOUNTER — OUTPATIENT (OUTPATIENT)
Dept: OUTPATIENT SERVICES | Facility: HOSPITAL | Age: 73
LOS: 1 days | End: 2018-06-28

## 2018-06-28 ENCOUNTER — APPOINTMENT (OUTPATIENT)
Dept: CV DIAGNOSTICS | Facility: HOSPITAL | Age: 73
End: 2018-06-28
Payer: MEDICARE

## 2018-06-28 ENCOUNTER — APPOINTMENT (OUTPATIENT)
Dept: CV DIAGNOSITCS | Facility: HOSPITAL | Age: 73
End: 2018-06-28
Payer: MEDICARE

## 2018-06-28 DIAGNOSIS — Z95.810 PRESENCE OF AUTOMATIC (IMPLANTABLE) CARDIAC DEFIBRILLATOR: Chronic | ICD-10-CM

## 2018-06-28 DIAGNOSIS — Z95.0 PRESENCE OF CARDIAC PACEMAKER: Chronic | ICD-10-CM

## 2018-06-28 DIAGNOSIS — Z95.5 PRESENCE OF CORONARY ANGIOPLASTY IMPLANT AND GRAFT: Chronic | ICD-10-CM

## 2018-06-28 DIAGNOSIS — I50.22 CHRONIC SYSTOLIC (CONGESTIVE) HEART FAILURE: ICD-10-CM

## 2018-06-28 PROCEDURE — 93306 TTE W/DOPPLER COMPLETE: CPT | Mod: 26

## 2018-06-28 PROCEDURE — 93018 CV STRESS TEST I&R ONLY: CPT | Mod: GC

## 2018-06-28 PROCEDURE — 78452 HT MUSCLE IMAGE SPECT MULT: CPT | Mod: 26

## 2018-06-28 PROCEDURE — 93016 CV STRESS TEST SUPVJ ONLY: CPT | Mod: GC

## 2018-07-06 ENCOUNTER — NON-APPOINTMENT (OUTPATIENT)
Age: 73
End: 2018-07-06

## 2018-07-06 ENCOUNTER — APPOINTMENT (OUTPATIENT)
Dept: CARDIOLOGY | Facility: CLINIC | Age: 73
End: 2018-07-06
Payer: MEDICARE

## 2018-07-06 VITALS
OXYGEN SATURATION: 96 % | HEART RATE: 65 BPM | DIASTOLIC BLOOD PRESSURE: 69 MMHG | HEIGHT: 64 IN | WEIGHT: 163 LBS | SYSTOLIC BLOOD PRESSURE: 108 MMHG | BODY MASS INDEX: 27.83 KG/M2 | RESPIRATION RATE: 16 BRPM

## 2018-07-06 PROCEDURE — 93000 ELECTROCARDIOGRAM COMPLETE: CPT

## 2018-07-06 PROCEDURE — 99215 OFFICE O/P EST HI 40 MIN: CPT

## 2018-07-06 PROCEDURE — 36415 COLL VENOUS BLD VENIPUNCTURE: CPT

## 2018-07-08 ENCOUNTER — MOBILE ON CALL (OUTPATIENT)
Age: 73
End: 2018-07-08

## 2018-07-09 LAB
ALBUMIN SERPL ELPH-MCNC: 4.2 G/DL
ALP BLD-CCNC: 54 U/L
ALT SERPL-CCNC: 18 U/L
ANION GAP SERPL CALC-SCNC: 20 MMOL/L
AST SERPL-CCNC: 30 U/L
BASOPHILS # BLD AUTO: 0.04 K/UL
BASOPHILS NFR BLD AUTO: 0.6 %
BILIRUB SERPL-MCNC: 0.4 MG/DL
BUN SERPL-MCNC: 36 MG/DL
CALCIUM SERPL-MCNC: 10.3 MG/DL
CHLORIDE SERPL-SCNC: 101 MMOL/L
CHOLEST SERPL-MCNC: 157 MG/DL
CHOLEST/HDLC SERPL: 3.7 RATIO
CO2 SERPL-SCNC: 20 MMOL/L
CREAT SERPL-MCNC: 1.55 MG/DL
EOSINOPHIL # BLD AUTO: 0.09 K/UL
EOSINOPHIL NFR BLD AUTO: 1.3 %
GLUCOSE SERPL-MCNC: 65 MG/DL
HBA1C MFR BLD HPLC: 6.8 %
HCT VFR BLD CALC: 40.2 %
HDLC SERPL-MCNC: 43 MG/DL
HGB BLD-MCNC: 13.2 G/DL
IMM GRANULOCYTES NFR BLD AUTO: 0.1 %
LDLC SERPL CALC-MCNC: 94 MG/DL
LYMPHOCYTES # BLD AUTO: 1.81 K/UL
LYMPHOCYTES NFR BLD AUTO: 27.1 %
MAN DIFF?: NORMAL
MCHC RBC-ENTMCNC: 30.6 PG
MCHC RBC-ENTMCNC: 32.8 GM/DL
MCV RBC AUTO: 93.3 FL
MONOCYTES # BLD AUTO: 0.75 K/UL
MONOCYTES NFR BLD AUTO: 11.2 %
NEUTROPHILS # BLD AUTO: 3.97 K/UL
NEUTROPHILS NFR BLD AUTO: 59.7 %
NT-PROBNP SERPL-MCNC: 84 PG/ML
PLATELET # BLD AUTO: 169 K/UL
POTASSIUM SERPL-SCNC: 4.6 MMOL/L
PROT SERPL-MCNC: 8.3 G/DL
RBC # BLD: 4.31 M/UL
RBC # FLD: 13.8 %
SODIUM SERPL-SCNC: 141 MMOL/L
TRIGL SERPL-MCNC: 99 MG/DL
WBC # FLD AUTO: 6.67 K/UL

## 2018-07-12 ENCOUNTER — LABORATORY RESULT (OUTPATIENT)
Age: 73
End: 2018-07-12

## 2018-07-12 ENCOUNTER — NON-APPOINTMENT (OUTPATIENT)
Age: 73
End: 2018-07-12

## 2018-07-12 ENCOUNTER — APPOINTMENT (OUTPATIENT)
Dept: INTERNAL MEDICINE | Facility: CLINIC | Age: 73
End: 2018-07-12
Payer: MEDICARE

## 2018-07-12 VITALS — DIASTOLIC BLOOD PRESSURE: 70 MMHG | SYSTOLIC BLOOD PRESSURE: 110 MMHG

## 2018-07-12 VITALS — WEIGHT: 165 LBS | BODY MASS INDEX: 28.17 KG/M2 | HEIGHT: 64 IN

## 2018-07-12 PROCEDURE — 99214 OFFICE O/P EST MOD 30 MIN: CPT | Mod: 25

## 2018-07-12 PROCEDURE — 36415 COLL VENOUS BLD VENIPUNCTURE: CPT

## 2018-07-12 PROCEDURE — 93000 ELECTROCARDIOGRAM COMPLETE: CPT

## 2018-07-12 NOTE — ASSESSMENT
[FreeTextEntry1] : Her physical examination was normal except for trace edema in the presence of an AICD in the left upper chest. Her EKG was unchanged. Blood for pain and she was referred to us amount

## 2018-07-12 NOTE — HISTORY OF PRESENT ILLNESS
[de-identified] : This is 72-year-old lady with a history of ASHD, status post AICD, diabetes mellitus, hypertension, kidney disease who is here today for a followup visit. Her main complaint today is of pain at the top of her head which is constant for past 3 months. A recent CAT scan of the head was normal. She has not seen an ophthalmologist in quite a while. I told her to see an ophthalmologist.

## 2018-07-12 NOTE — PHYSICAL EXAM

## 2018-07-18 LAB
25(OH)D3 SERPL-MCNC: 33.7 NG/ML
ALBUMIN SERPL ELPH-MCNC: 4.1 G/DL
ALP BLD-CCNC: 54 U/L
ALT SERPL-CCNC: 22 U/L
ANION GAP SERPL CALC-SCNC: 14 MMOL/L
AST SERPL-CCNC: 32 U/L
BASOPHILS # BLD AUTO: 0.04 K/UL
BASOPHILS NFR BLD AUTO: 0.5 %
BILIRUB SERPL-MCNC: 0.4 MG/DL
BUN SERPL-MCNC: 30 MG/DL
CALCIUM SERPL-MCNC: 10.1 MG/DL
CHLORIDE SERPL-SCNC: 103 MMOL/L
CHOLEST SERPL-MCNC: 154 MG/DL
CHOLEST/HDLC SERPL: 3.4 RATIO
CO2 SERPL-SCNC: 26 MMOL/L
CREAT SERPL-MCNC: 1.34 MG/DL
EOSINOPHIL # BLD AUTO: 0.11 K/UL
EOSINOPHIL NFR BLD AUTO: 1.5 %
GLUCOSE SERPL-MCNC: 90 MG/DL
HBA1C MFR BLD HPLC: 6.8 %
HCT VFR BLD CALC: 41.2 %
HDLC SERPL-MCNC: 45 MG/DL
HGB BLD-MCNC: 13.2 G/DL
IMM GRANULOCYTES NFR BLD AUTO: 0.1 %
LDLC SERPL CALC-MCNC: 90 MG/DL
LYMPHOCYTES # BLD AUTO: 2.02 K/UL
LYMPHOCYTES NFR BLD AUTO: 27.3 %
MAN DIFF?: NORMAL
MCHC RBC-ENTMCNC: 30.4 PG
MCHC RBC-ENTMCNC: 32 GM/DL
MCV RBC AUTO: 94.9 FL
MONOCYTES # BLD AUTO: 0.53 K/UL
MONOCYTES NFR BLD AUTO: 7.2 %
NEUTROPHILS # BLD AUTO: 4.69 K/UL
NEUTROPHILS NFR BLD AUTO: 63.4 %
PLATELET # BLD AUTO: 172 K/UL
POTASSIUM SERPL-SCNC: 5 MMOL/L
PROT SERPL-MCNC: 8 G/DL
RBC # BLD: 4.34 M/UL
RBC # FLD: 14.5 %
SAVE SPECIMEN: NORMAL
SODIUM SERPL-SCNC: 142 MMOL/L
T3RU NFR SERPL: 1 INDEX
T4 SERPL-MCNC: 5.8 UG/DL
TRIGL SERPL-MCNC: 97 MG/DL
TSH SERPL-ACNC: 1.48 UIU/ML
URATE SERPL-MCNC: 6.4 MG/DL
WBC # FLD AUTO: 7.4 K/UL

## 2018-08-08 ENCOUNTER — APPOINTMENT (OUTPATIENT)
Dept: ELECTROPHYSIOLOGY | Facility: CLINIC | Age: 73
End: 2018-08-08

## 2018-08-17 ENCOUNTER — APPOINTMENT (OUTPATIENT)
Dept: NEUROLOGY | Facility: CLINIC | Age: 73
End: 2018-08-17
Payer: MEDICARE

## 2018-08-17 VITALS
HEIGHT: 64 IN | BODY MASS INDEX: 28.17 KG/M2 | SYSTOLIC BLOOD PRESSURE: 122 MMHG | WEIGHT: 165 LBS | HEART RATE: 63 BPM | DIASTOLIC BLOOD PRESSURE: 67 MMHG

## 2018-08-17 PROCEDURE — 99213 OFFICE O/P EST LOW 20 MIN: CPT

## 2018-08-21 ENCOUNTER — CLINICAL ADVICE (OUTPATIENT)
Age: 73
End: 2018-08-21

## 2018-08-21 DIAGNOSIS — N28.1 CYST OF KIDNEY, ACQUIRED: ICD-10-CM

## 2018-09-13 ENCOUNTER — APPOINTMENT (OUTPATIENT)
Dept: ENDOCRINOLOGY | Facility: CLINIC | Age: 73
End: 2018-09-13
Payer: MEDICARE

## 2018-09-13 VITALS
SYSTOLIC BLOOD PRESSURE: 110 MMHG | OXYGEN SATURATION: 98 % | BODY MASS INDEX: 28.85 KG/M2 | HEIGHT: 64 IN | DIASTOLIC BLOOD PRESSURE: 60 MMHG | WEIGHT: 169 LBS | HEART RATE: 60 BPM

## 2018-09-13 PROBLEM — E78.5 HYPERLIPIDEMIA, UNSPECIFIED: Chronic | Status: ACTIVE | Noted: 2017-08-17

## 2018-09-13 PROBLEM — E11.9 TYPE 2 DIABETES MELLITUS WITHOUT COMPLICATIONS: Chronic | Status: ACTIVE | Noted: 2017-08-17

## 2018-09-13 PROBLEM — I10 ESSENTIAL (PRIMARY) HYPERTENSION: Chronic | Status: ACTIVE | Noted: 2017-08-17

## 2018-09-13 PROBLEM — I48.91 UNSPECIFIED ATRIAL FIBRILLATION: Chronic | Status: ACTIVE | Noted: 2017-08-17

## 2018-09-13 PROCEDURE — 99214 OFFICE O/P EST MOD 30 MIN: CPT

## 2018-09-14 ENCOUNTER — MEDICATION RENEWAL (OUTPATIENT)
Age: 73
End: 2018-09-14

## 2018-09-28 ENCOUNTER — APPOINTMENT (OUTPATIENT)
Dept: ELECTROPHYSIOLOGY | Facility: CLINIC | Age: 73
End: 2018-09-28
Payer: MEDICARE

## 2018-09-28 PROCEDURE — 93284 PRGRMG EVAL IMPLANTABLE DFB: CPT

## 2018-10-10 ENCOUNTER — FORM ENCOUNTER (OUTPATIENT)
Age: 73
End: 2018-10-10

## 2018-10-11 ENCOUNTER — OTHER (OUTPATIENT)
Age: 73
End: 2018-10-11

## 2018-10-11 ENCOUNTER — APPOINTMENT (OUTPATIENT)
Dept: ULTRASOUND IMAGING | Facility: IMAGING CENTER | Age: 73
End: 2018-10-11

## 2018-10-11 ENCOUNTER — APPOINTMENT (OUTPATIENT)
Dept: INTERNAL MEDICINE | Facility: CLINIC | Age: 73
End: 2018-10-11
Payer: MEDICARE

## 2018-10-11 ENCOUNTER — OUTPATIENT (OUTPATIENT)
Dept: OUTPATIENT SERVICES | Facility: HOSPITAL | Age: 73
LOS: 1 days | End: 2018-10-11
Payer: MEDICARE

## 2018-10-11 VITALS
WEIGHT: 165 LBS | SYSTOLIC BLOOD PRESSURE: 107 MMHG | DIASTOLIC BLOOD PRESSURE: 69 MMHG | HEIGHT: 64 IN | HEART RATE: 67 BPM | BODY MASS INDEX: 28.17 KG/M2

## 2018-10-11 DIAGNOSIS — M79.89 OTHER SPECIFIED SOFT TISSUE DISORDERS: ICD-10-CM

## 2018-10-11 DIAGNOSIS — Z95.810 PRESENCE OF AUTOMATIC (IMPLANTABLE) CARDIAC DEFIBRILLATOR: Chronic | ICD-10-CM

## 2018-10-11 DIAGNOSIS — Z00.8 ENCOUNTER FOR OTHER GENERAL EXAMINATION: ICD-10-CM

## 2018-10-11 DIAGNOSIS — Z95.5 PRESENCE OF CORONARY ANGIOPLASTY IMPLANT AND GRAFT: Chronic | ICD-10-CM

## 2018-10-11 DIAGNOSIS — Z95.0 PRESENCE OF CARDIAC PACEMAKER: Chronic | ICD-10-CM

## 2018-10-11 LAB — GLUCOSE BLDC GLUCOMTR-MCNC: 114

## 2018-10-11 PROCEDURE — 93970 EXTREMITY STUDY: CPT

## 2018-10-11 PROCEDURE — 36415 COLL VENOUS BLD VENIPUNCTURE: CPT

## 2018-10-11 PROCEDURE — 99214 OFFICE O/P EST MOD 30 MIN: CPT | Mod: 25

## 2018-10-11 PROCEDURE — 93970 EXTREMITY STUDY: CPT | Mod: 26

## 2018-10-11 NOTE — HISTORY OF PRESENT ILLNESS
[de-identified] : 73 year old female here today with complaints of swelling her her right foot. \par Today she comes in with pain in her right foot.  She did not injur it.  It started about a week ago. \par She does see a foot doctor.  She has had swelling in the past. \par She isnt able to remember when she saw him. \par \par DM: on levemir\par \par CHF: no cp, no SOB \par \par She otherwise feels well.

## 2018-10-11 NOTE — ASSESSMENT
[FreeTextEntry1] : Calf pain: STAT Venous duplex \par \par DM: will check a1c\par \par HTN: bp stable\par \par Routine labs

## 2018-10-11 NOTE — PHYSICAL EXAM
[No Acute Distress] : no acute distress [Well Nourished] : well nourished [Well Developed] : well developed [Well-Appearing] : well-appearing [No Respiratory Distress] : no respiratory distress  [Clear to Auscultation] : lungs were clear to auscultation bilaterally [No Accessory Muscle Use] : no accessory muscle use [Normal Rate] : normal rate  [Regular Rhythm] : with a regular rhythm [Normal S1, S2] : normal S1 and S2 [No Murmur] : no murmur heard [Soft] : abdomen soft [Non Tender] : non-tender [Non-distended] : non-distended [No Masses] : no abdominal mass palpated [No HSM] : no HSM [Normal Bowel Sounds] : normal bowel sounds [Normal Affect] : the affect was normal [Normal Insight/Judgement] : insight and judgment were intact [de-identified] : pacemaker [de-identified] : 2/6 swelling in right ankle, foot and calf pain

## 2018-10-12 ENCOUNTER — MEDICATION RENEWAL (OUTPATIENT)
Age: 73
End: 2018-10-12

## 2018-11-09 ENCOUNTER — NON-APPOINTMENT (OUTPATIENT)
Age: 73
End: 2018-11-09

## 2018-11-09 ENCOUNTER — FORM ENCOUNTER (OUTPATIENT)
Age: 73
End: 2018-11-09

## 2018-11-09 ENCOUNTER — APPOINTMENT (OUTPATIENT)
Dept: CARDIOLOGY | Facility: CLINIC | Age: 73
End: 2018-11-09
Payer: MEDICARE

## 2018-11-09 VITALS
HEIGHT: 64 IN | OXYGEN SATURATION: 98 % | HEART RATE: 65 BPM | WEIGHT: 168 LBS | SYSTOLIC BLOOD PRESSURE: 118 MMHG | BODY MASS INDEX: 28.68 KG/M2 | DIASTOLIC BLOOD PRESSURE: 72 MMHG

## 2018-11-09 PROCEDURE — 93000 ELECTROCARDIOGRAM COMPLETE: CPT

## 2018-11-09 PROCEDURE — 36415 COLL VENOUS BLD VENIPUNCTURE: CPT

## 2018-11-09 PROCEDURE — 99214 OFFICE O/P EST MOD 30 MIN: CPT

## 2018-11-10 ENCOUNTER — OUTPATIENT (OUTPATIENT)
Dept: OUTPATIENT SERVICES | Facility: HOSPITAL | Age: 73
LOS: 1 days | End: 2018-11-10
Payer: MEDICARE

## 2018-11-10 ENCOUNTER — APPOINTMENT (OUTPATIENT)
Dept: RADIOLOGY | Facility: IMAGING CENTER | Age: 73
End: 2018-11-10
Payer: MEDICARE

## 2018-11-10 DIAGNOSIS — Z95.810 PRESENCE OF AUTOMATIC (IMPLANTABLE) CARDIAC DEFIBRILLATOR: Chronic | ICD-10-CM

## 2018-11-10 DIAGNOSIS — Z95.0 PRESENCE OF CARDIAC PACEMAKER: Chronic | ICD-10-CM

## 2018-11-10 DIAGNOSIS — M79.671 PAIN IN RIGHT FOOT: ICD-10-CM

## 2018-11-10 DIAGNOSIS — Z95.5 PRESENCE OF CORONARY ANGIOPLASTY IMPLANT AND GRAFT: Chronic | ICD-10-CM

## 2018-11-10 PROCEDURE — 73630 X-RAY EXAM OF FOOT: CPT

## 2018-11-10 PROCEDURE — 73630 X-RAY EXAM OF FOOT: CPT | Mod: 26,RT

## 2018-11-11 LAB
ALBUMIN SERPL ELPH-MCNC: 4.2 G/DL
ALP BLD-CCNC: 55 U/L
ALT SERPL-CCNC: 19 U/L
ANION GAP SERPL CALC-SCNC: 15 MMOL/L
AST SERPL-CCNC: 27 U/L
BILIRUB SERPL-MCNC: 0.4 MG/DL
BUN SERPL-MCNC: 31 MG/DL
CALCIUM SERPL-MCNC: 10.1 MG/DL
CHLORIDE SERPL-SCNC: 104 MMOL/L
CHOLEST SERPL-MCNC: 148 MG/DL
CHOLEST/HDLC SERPL: 3.3 RATIO
CO2 SERPL-SCNC: 23 MMOL/L
CREAT SERPL-MCNC: 1.23 MG/DL
GLUCOSE SERPL-MCNC: 28 MG/DL
HBA1C MFR BLD HPLC: 6.5 %
HDLC SERPL-MCNC: 45 MG/DL
LDLC SERPL CALC-MCNC: 88 MG/DL
NT-PROBNP SERPL-MCNC: 122 PG/ML
POTASSIUM SERPL-SCNC: 4.4 MMOL/L
PROT SERPL-MCNC: 8 G/DL
SODIUM SERPL-SCNC: 142 MMOL/L
TRIGL SERPL-MCNC: 74 MG/DL

## 2018-11-11 NOTE — ASSESSMENT
[FreeTextEntry1] : 71 y/o F w/ h/o IDDM (A1c 6.5), HFrEF (fluctuating EF 25%, Dx 2011; improved to 70% 2016) s/p [employer] ICD, CAD s/p several PCI (last 2016; no h/o MI) who is here for f/u evaluation, referred by Dr. Zhu who has been doing well since last visit with negative cardiovascular testing since. Currently appears euvolemic and well compensated.\par \par 1. HFrEF - resolved; s/p BiV ICD\par - continue lasix every other day\par - continue hydral 10 mg q8h, isordil 20 mg q8h, valsartan 40 mg twice daily, and price 25 mg daily\par - will increase toprol XL to 100 mg daily \par - labs checked in office today and were improved from prior\par \par 2. CAD - recent stress test w/o ischemia; chest pain resolved\par - last PCI 2016; on ASA 81 mg daily and Effient 10 mg daily; can consider d/c'ing Effient as last stent was 2016\par - on crestor 40 mg qhs; lipid panel checked which was still above goal (LDL 88); will start zetia 10 mg daily\par - increase toprol to 100 mg daily \par \par RTC 4 months

## 2018-11-11 NOTE — PHYSICAL EXAM
[Normal Conjunctiva] : the conjunctiva exhibited no abnormalities [Auscultation Breath Sounds / Voice Sounds] : lungs were clear to auscultation bilaterally [FreeTextEntry1] : mild cognitive impairment with memory recall

## 2018-11-11 NOTE — HISTORY OF PRESENT ILLNESS
[FreeTextEntry1] : 72 y/o F w/ h/o IDDM (A1c 13), HFrEF (fluctuating EF 25%, Dx 2011; improved to 70% 2016) s/p [employer] ICD, CAD s/p several PCI (last 2016; no h/o MI) who is here for follow up. \par \mauricio Was last seen 7/6 where she was tomer flora and her lasix was changed to every other day. She has been doing well since. Denies orthopnea/PND/LE edema. \par \par Takes all medications and follows low sodium/fluid restriction. Denies palpitations/ICD shocks. \par

## 2018-12-14 ENCOUNTER — APPOINTMENT (OUTPATIENT)
Dept: ENDOCRINOLOGY | Facility: CLINIC | Age: 73
End: 2018-12-14
Payer: MEDICARE

## 2018-12-14 VITALS — BODY MASS INDEX: 28.17 KG/M2 | HEART RATE: 70 BPM | OXYGEN SATURATION: 97 % | WEIGHT: 165 LBS | HEIGHT: 64 IN

## 2018-12-14 VITALS — DIASTOLIC BLOOD PRESSURE: 76 MMHG | SYSTOLIC BLOOD PRESSURE: 118 MMHG

## 2018-12-14 PROCEDURE — 99214 OFFICE O/P EST MOD 30 MIN: CPT

## 2019-01-28 ENCOUNTER — APPOINTMENT (OUTPATIENT)
Dept: ELECTROPHYSIOLOGY | Facility: CLINIC | Age: 74
End: 2019-01-28

## 2019-01-28 ENCOUNTER — APPOINTMENT (OUTPATIENT)
Dept: ELECTROPHYSIOLOGY | Facility: CLINIC | Age: 74
End: 2019-01-28
Payer: MEDICARE

## 2019-01-28 ENCOUNTER — MEDICATION RENEWAL (OUTPATIENT)
Age: 74
End: 2019-01-28

## 2019-01-28 PROCEDURE — 93284 PRGRMG EVAL IMPLANTABLE DFB: CPT

## 2019-01-28 PROCEDURE — 93290 INTERROG DEV EVAL ICPMS IP: CPT

## 2019-01-29 ENCOUNTER — APPOINTMENT (OUTPATIENT)
Dept: INTERNAL MEDICINE | Facility: CLINIC | Age: 74
End: 2019-01-29
Payer: MEDICARE

## 2019-01-29 ENCOUNTER — LABORATORY RESULT (OUTPATIENT)
Age: 74
End: 2019-01-29

## 2019-01-29 ENCOUNTER — NON-APPOINTMENT (OUTPATIENT)
Age: 74
End: 2019-01-29

## 2019-01-29 ENCOUNTER — MEDICATION RENEWAL (OUTPATIENT)
Age: 74
End: 2019-01-29

## 2019-01-29 ENCOUNTER — RX RENEWAL (OUTPATIENT)
Age: 74
End: 2019-01-29

## 2019-01-29 VITALS — WEIGHT: 166 LBS | BODY MASS INDEX: 28.34 KG/M2 | HEIGHT: 64 IN

## 2019-01-29 VITALS — SYSTOLIC BLOOD PRESSURE: 120 MMHG | DIASTOLIC BLOOD PRESSURE: 70 MMHG

## 2019-01-29 LAB
ALBUMIN SERPL ELPH-MCNC: 4.3 G/DL
ALP BLD-CCNC: 53 U/L
ALT SERPL-CCNC: 34 U/L
ANION GAP SERPL CALC-SCNC: 14 MMOL/L
AST SERPL-CCNC: 34 U/L
BILIRUB SERPL-MCNC: 0.7 MG/DL
BUN SERPL-MCNC: 29 MG/DL
CALCIUM SERPL-MCNC: 10.5 MG/DL
CHLORIDE SERPL-SCNC: 103 MMOL/L
CO2 SERPL-SCNC: 24 MMOL/L
CREAT SERPL-MCNC: 1.26 MG/DL
GLUCOSE SERPL-MCNC: 99 MG/DL
NT-PROBNP SERPL-MCNC: 168 PG/ML
POTASSIUM SERPL-SCNC: 4.8 MMOL/L
PROT SERPL-MCNC: 8 G/DL
SODIUM SERPL-SCNC: 140 MMOL/L

## 2019-01-29 PROCEDURE — 93000 ELECTROCARDIOGRAM COMPLETE: CPT

## 2019-01-29 PROCEDURE — 99214 OFFICE O/P EST MOD 30 MIN: CPT | Mod: 25

## 2019-01-29 PROCEDURE — 36415 COLL VENOUS BLD VENIPUNCTURE: CPT

## 2019-01-30 LAB
25(OH)D3 SERPL-MCNC: 39.6 NG/ML
ALBUMIN SERPL ELPH-MCNC: 4.3 G/DL
ALP BLD-CCNC: 51 U/L
ALT SERPL-CCNC: 33 U/L
ANION GAP SERPL CALC-SCNC: 9 MMOL/L
AST SERPL-CCNC: 32 U/L
BASOPHILS # BLD AUTO: 0.03 K/UL
BASOPHILS NFR BLD AUTO: 0.4 %
BILIRUB SERPL-MCNC: 0.6 MG/DL
BUN SERPL-MCNC: 25 MG/DL
CALCIUM SERPL-MCNC: 9.9 MG/DL
CHLORIDE SERPL-SCNC: 102 MMOL/L
CHOLEST SERPL-MCNC: 141 MG/DL
CHOLEST/HDLC SERPL: 3.1 RATIO
CO2 SERPL-SCNC: 27 MMOL/L
CREAT SERPL-MCNC: 1.16 MG/DL
EOSINOPHIL # BLD AUTO: 0.06 K/UL
EOSINOPHIL NFR BLD AUTO: 0.7 %
FERRITIN SERPL-MCNC: 149 NG/ML
GLUCOSE SERPL-MCNC: 49 MG/DL
HBA1C MFR BLD HPLC: 7.1 %
HCT VFR BLD CALC: 41 %
HDLC SERPL-MCNC: 45 MG/DL
HGB BLD-MCNC: 12.9 G/DL
IMM GRANULOCYTES NFR BLD AUTO: 0.1 %
LDLC SERPL CALC-MCNC: 81 MG/DL
LYMPHOCYTES # BLD AUTO: 3.15 K/UL
LYMPHOCYTES NFR BLD AUTO: 37.5 %
MAN DIFF?: NORMAL
MCHC RBC-ENTMCNC: 30.2 PG
MCHC RBC-ENTMCNC: 31.5 GM/DL
MCV RBC AUTO: 96 FL
MONOCYTES # BLD AUTO: 0.67 K/UL
MONOCYTES NFR BLD AUTO: 8 %
NEUTROPHILS # BLD AUTO: 4.47 K/UL
NEUTROPHILS NFR BLD AUTO: 53.3 %
PLATELET # BLD AUTO: 167 K/UL
POTASSIUM SERPL-SCNC: 4.5 MMOL/L
PROT SERPL-MCNC: 8 G/DL
RBC # BLD: 4.27 M/UL
RBC # FLD: 14.8 %
SAVE SPECIMEN: NORMAL
SODIUM SERPL-SCNC: 138 MMOL/L
T3RU NFR SERPL: 1 INDEX
T4 SERPL-MCNC: 5.8 UG/DL
TRIGL SERPL-MCNC: 75 MG/DL
TSH SERPL-ACNC: 2.63 UIU/ML
URATE SERPL-MCNC: 5.8 MG/DL
VIT B12 SERPL-MCNC: 1126 PG/ML
WBC # FLD AUTO: 8.39 K/UL

## 2019-02-08 ENCOUNTER — APPOINTMENT (OUTPATIENT)
Dept: CARDIOLOGY | Facility: CLINIC | Age: 74
End: 2019-02-08
Payer: MEDICARE

## 2019-02-08 ENCOUNTER — NON-APPOINTMENT (OUTPATIENT)
Age: 74
End: 2019-02-08

## 2019-02-08 VITALS
DIASTOLIC BLOOD PRESSURE: 69 MMHG | SYSTOLIC BLOOD PRESSURE: 102 MMHG | HEIGHT: 64 IN | OXYGEN SATURATION: 97 % | RESPIRATION RATE: 16 BRPM | HEART RATE: 60 BPM | BODY MASS INDEX: 28 KG/M2 | WEIGHT: 164 LBS

## 2019-02-08 VITALS — DIASTOLIC BLOOD PRESSURE: 68 MMHG | SYSTOLIC BLOOD PRESSURE: 96 MMHG

## 2019-02-08 PROCEDURE — 93000 ELECTROCARDIOGRAM COMPLETE: CPT

## 2019-02-08 PROCEDURE — 99214 OFFICE O/P EST MOD 30 MIN: CPT

## 2019-02-08 NOTE — PHYSICAL EXAM
[Normal Conjunctiva] : the conjunctiva exhibited no abnormalities [Auscultation Breath Sounds / Voice Sounds] : lungs were clear to auscultation bilaterally [Well Groomed] : well groomed [General Appearance - In No Acute Distress] : no acute distress [Normal Oral Mucosa] : normal oral mucosa [No Oral Pallor] : no oral pallor [Normal Oropharynx] : normal oropharynx [] : no respiratory distress [Respiration, Rhythm And Depth] : normal respiratory rhythm and effort [Heart Rate And Rhythm] : heart rate and rhythm were normal [Heart Sounds] : normal S1 and S2 [Arterial Pulses Normal] : the arterial pulses were normal [Nail Clubbing] : no clubbing of the fingernails [Cyanosis, Localized] : no localized cyanosis [Skin Color & Pigmentation] : normal skin color and pigmentation [FreeTextEntry1] : mild cognitive impairment with memory recall

## 2019-02-08 NOTE — ASSESSMENT
[FreeTextEntry1] : 74 y/o F w/ h/o IDDM (A1c 6.5), HFrEF (fluctuating EF 25%, Dx 2011; improved to 70% 2016) s/p [employer] CRT-D, CAD s/p several PCI (last 2016; no h/o MI) referred by Dr. Zhu who has been doing well since last visit with negative cardiovascular testing since. Currently appears euvolemic and compensated but with episodes of dizziness found to have low B/P 102/69 to 96/68..\par \par 1. HFrEF - resolved; s/p BiV ICD\par - change lasix to 20 mg three times a week on [id number] and as needed for weight gain of 2 lbs\par - continue hydral 10 mg q8h, isordil 20 mg q8h, valsartan 40 mg twice daily, and price 25 mg daily\par - will continue toprol XL to 100 mg daily \par - schedule echocardiogram to follow up with next appt\par - labs checked 1/29/19 ( K 4.5 and BUN/creat 25/1.16), pro BNP on 1/28 168\par - pt with dizziness, will see if there is any improvement with decrease in furosemide\par - will refer for Home PT ( [facility]) if her insurance accepts\par - pt will check with primary to see if she is eligible for home services including HHA\par \par 2. CAD - recent stress test w/o ischemia; still with some right chest pains but is reproducible, likely musculoskeletal \par - last PCI 2016; on ASA 81 mg daily and Effient 10 mg daily; can consider d/c'ing Effient as last stent was 2016\par - on crestor 40 mg qhs; lipid panel checked which was still above goal (LDL 81); will continue zetia 10 mg daily\par - continue toprol to 100 mg daily \par \par RTC 2 months

## 2019-02-08 NOTE — REVIEW OF SYSTEMS
[see HPI] : see HPI [Negative] : Heme/Lymph [Chest Pain] : chest pain [Dysphagia] : dysphagia [Dizziness] : dizziness

## 2019-02-08 NOTE — HISTORY OF PRESENT ILLNESS
[FreeTextEntry1] : 74 y/o F w/ h/o IDDM (A1c 13), HFrEF (fluctuating EF 25%, Dx 2011; improved to 70% 2016) s/p [employer] CRT-D, CAD s/p several PCI (last 2016; no h/o MI) who is here for follow up. \par \par Pt is here for a follow up appt . She was last seen by Dr. Chambers on 11/9/18 and metoprolol was increased to 100 mg daily and she continued other medications hyral 10 q8, tiffany 20 q8, valsartan 40 bid and lasix 20 mg QOD. Pt had a CRT-D device check 1/28/19 to establish care with Dr. Phillips in the device clinic and found to be functioning appropriately. Weight is stable in the range of 164 lbs. Pt states she lives at home with her daughter. She is able to walk around in her home from room to room without dyspnea but is limited by dizziness and feels head is heavy at times. She is afraid she may fall but has not had any falls since last visit. She can climb up to 5 steps if holding on and she sleeps with 2 pillows with no orthopnea. She uses a wheelchair for longer distances. She states she gets right sided chest pains that radiate to her right arm about once per week and the pain was reproducible today. She also states she has difficulty swallowing certain foods at times. She was just seen by her primary 1/29/19 and had lab work done with normal CBC, BMP, pro BNP but with elevated Hg A1C.  Denies orthopnea/PND/LE edema. \par \par Takes all medications and follows low sodium/fluid restriction. Denies palpitations/ICD shocks. \par

## 2019-02-14 ENCOUNTER — FORM ENCOUNTER (OUTPATIENT)
Age: 74
End: 2019-02-14

## 2019-02-15 ENCOUNTER — OUTPATIENT (OUTPATIENT)
Dept: OUTPATIENT SERVICES | Facility: HOSPITAL | Age: 74
LOS: 1 days | End: 2019-02-15
Payer: MEDICARE

## 2019-02-15 ENCOUNTER — APPOINTMENT (OUTPATIENT)
Dept: CT IMAGING | Facility: IMAGING CENTER | Age: 74
End: 2019-02-15
Payer: MEDICARE

## 2019-02-15 DIAGNOSIS — E11.65 TYPE 2 DIABETES MELLITUS WITH HYPERGLYCEMIA: ICD-10-CM

## 2019-02-15 DIAGNOSIS — Z95.810 PRESENCE OF AUTOMATIC (IMPLANTABLE) CARDIAC DEFIBRILLATOR: Chronic | ICD-10-CM

## 2019-02-15 DIAGNOSIS — G95.9 DISEASE OF SPINAL CORD, UNSPECIFIED: ICD-10-CM

## 2019-02-15 DIAGNOSIS — Z95.5 PRESENCE OF CORONARY ANGIOPLASTY IMPLANT AND GRAFT: Chronic | ICD-10-CM

## 2019-02-15 DIAGNOSIS — Z95.810 PRESENCE OF AUTOMATIC (IMPLANTABLE) CARDIAC DEFIBRILLATOR: ICD-10-CM

## 2019-02-15 DIAGNOSIS — Z95.0 PRESENCE OF CARDIAC PACEMAKER: Chronic | ICD-10-CM

## 2019-02-15 PROCEDURE — 70450 CT HEAD/BRAIN W/O DYE: CPT | Mod: 26

## 2019-02-15 PROCEDURE — 70450 CT HEAD/BRAIN W/O DYE: CPT

## 2019-03-08 ENCOUNTER — TRANSCRIPTION ENCOUNTER (OUTPATIENT)
Age: 74
End: 2019-03-08

## 2019-03-13 ENCOUNTER — APPOINTMENT (OUTPATIENT)
Dept: OPHTHALMOLOGY | Facility: CLINIC | Age: 74
End: 2019-03-13
Payer: MEDICARE

## 2019-03-13 PROCEDURE — 92134 CPTRZ OPH DX IMG PST SGM RTA: CPT

## 2019-03-13 PROCEDURE — 92004 COMPRE OPH EXAM NEW PT 1/>: CPT

## 2019-03-29 ENCOUNTER — APPOINTMENT (OUTPATIENT)
Dept: CV DIAGNOSITCS | Facility: HOSPITAL | Age: 74
End: 2019-03-29
Payer: MEDICARE

## 2019-03-29 ENCOUNTER — NON-APPOINTMENT (OUTPATIENT)
Age: 74
End: 2019-03-29

## 2019-03-29 ENCOUNTER — OUTPATIENT (OUTPATIENT)
Dept: OUTPATIENT SERVICES | Facility: HOSPITAL | Age: 74
LOS: 1 days | End: 2019-03-29

## 2019-03-29 ENCOUNTER — APPOINTMENT (OUTPATIENT)
Dept: CARDIOLOGY | Facility: CLINIC | Age: 74
End: 2019-03-29
Payer: MEDICARE

## 2019-03-29 VITALS
DIASTOLIC BLOOD PRESSURE: 69 MMHG | HEART RATE: 71 BPM | HEIGHT: 64 IN | OXYGEN SATURATION: 97 % | RESPIRATION RATE: 16 BRPM | SYSTOLIC BLOOD PRESSURE: 128 MMHG | BODY MASS INDEX: 28 KG/M2 | WEIGHT: 164 LBS

## 2019-03-29 DIAGNOSIS — Z95.810 PRESENCE OF AUTOMATIC (IMPLANTABLE) CARDIAC DEFIBRILLATOR: Chronic | ICD-10-CM

## 2019-03-29 DIAGNOSIS — Z95.5 PRESENCE OF CORONARY ANGIOPLASTY IMPLANT AND GRAFT: Chronic | ICD-10-CM

## 2019-03-29 DIAGNOSIS — I50.22 CHRONIC SYSTOLIC (CONGESTIVE) HEART FAILURE: ICD-10-CM

## 2019-03-29 DIAGNOSIS — Z95.0 PRESENCE OF CARDIAC PACEMAKER: Chronic | ICD-10-CM

## 2019-03-29 PROCEDURE — 99214 OFFICE O/P EST MOD 30 MIN: CPT

## 2019-03-29 PROCEDURE — 36415 COLL VENOUS BLD VENIPUNCTURE: CPT

## 2019-03-29 PROCEDURE — 93306 TTE W/DOPPLER COMPLETE: CPT | Mod: 26

## 2019-03-29 PROCEDURE — 93000 ELECTROCARDIOGRAM COMPLETE: CPT

## 2019-03-29 NOTE — PHYSICAL EXAM
[Well Groomed] : well groomed [General Appearance - In No Acute Distress] : no acute distress [Normal Conjunctiva] : the conjunctiva exhibited no abnormalities [Normal Oral Mucosa] : normal oral mucosa [No Oral Pallor] : no oral pallor [Normal Oropharynx] : normal oropharynx [Respiration, Rhythm And Depth] : normal respiratory rhythm and effort [Auscultation Breath Sounds / Voice Sounds] : lungs were clear to auscultation bilaterally [Heart Rate And Rhythm] : heart rate and rhythm were normal [Heart Sounds] : normal S1 and S2 [Arterial Pulses Normal] : the arterial pulses were normal [Nail Clubbing] : no clubbing of the fingernails [Cyanosis, Localized] : no localized cyanosis [Skin Color & Pigmentation] : normal skin color and pigmentation [Normal Appearance] : normal appearance [Bowel Sounds] : normal bowel sounds [Abdomen Soft] : soft [Abdomen Tenderness] : non-tender [Skin Turgor] : normal skin turgor [] : no rash [FreeTextEntry1] : mild cognitive impairment with memory recall

## 2019-03-29 NOTE — HISTORY OF PRESENT ILLNESS
[FreeTextEntry1] : 74 y/o F w/ h/o IDDM (A1c 13), HFrEF (fluctuating EF 25%, Dx 2011; improved to 70% 2016) s/p [employer] CRT-D, CAD s/p several PCI (last 2016; no h/o MI) who is here for follow up. \par \par Pt is here for a follow up appt . She was last seen in NP clinic on 2/8/19 and furosemide was changed to lasix 20 mg TIW from QOD. Pt had a CRT-D device check 1/28/19 to establish care with Dr. Phillips in the device clinic and found to be functioning appropriately. Weight is stable in the range of 164 lbs. Pt states she lives at home with her daughter. She is able to walk around in her home from room to room without dyspnea. She started home PT since last visit 3 times a week and is able to climb 9-10 stairs and participate in the home exercises. She sleeps with 2 pillows with no orthopnea. She uses a wheelchair for longer distances.  Denies chest paindizziness/ LH, PND and LE edema. Pt is scheduled to have a TTE today.\par \par Takes all medications and follows low sodium/fluid restriction. Denies palpitations/ICD shocks. \par

## 2019-03-29 NOTE — ASSESSMENT
[FreeTextEntry1] : 72 y/o F w/ h/o IDDM (A1c 6.5), HFrEF (fluctuating EF 25%, Dx 2011; improved to 70% 2016) s/p [employer] CRT-D, CAD s/p several PCI (last 2016; no h/o MI) referred by Dr. Zhu who has been doing well since last visit with negative cardiovascular testing since. Currently appears euvolemic and compensated and normotensive.\par \par 1. HFrEF - resolved; s/p BiV ICD with ICD 70%\par - continue lasix to 20 mg three times a week on Yjy-Xhu-Nuiala and as needed for weight gain of 2 lbs\par - continue hydral 10 mg q8h, isordil 20 mg q8h, valsartan 40 mg twice daily, and price 25 mg daily\par - will continue toprol XL to 100 mg daily \par - for repeat echocardiogram today\par - pt with no further dizziness after decrease in furosemide\par - pt is doing well with Home PT ( [facility]) 3 x per week\par - pt will check with primary to see if she is eligible for home services including HHA\par \par 2. CAD - stress test w/o ischemia; no further chest pian\par - last PCI 2016; on ASA 81 mg daily and Effient 10 mg daily; can consider d/c'ing Effient as last stent was 2016\par - on crestor 40 mg qhs; will continue zetia 10 mg daily\par - continue toprol to 100 mg daily \par \par RTC 3 months

## 2019-04-01 ENCOUNTER — APPOINTMENT (OUTPATIENT)
Dept: ENDOCRINOLOGY | Facility: CLINIC | Age: 74
End: 2019-04-01
Payer: MEDICARE

## 2019-04-01 VITALS
DIASTOLIC BLOOD PRESSURE: 60 MMHG | BODY MASS INDEX: 28 KG/M2 | HEIGHT: 64 IN | OXYGEN SATURATION: 98 % | HEART RATE: 65 BPM | WEIGHT: 164 LBS | SYSTOLIC BLOOD PRESSURE: 124 MMHG

## 2019-04-01 VITALS — WEIGHT: 166 LBS | BODY MASS INDEX: 28.49 KG/M2

## 2019-04-01 PROCEDURE — 95251 CONT GLUC MNTR ANALYSIS I&R: CPT

## 2019-04-01 PROCEDURE — 95250 CONT GLUC MNTR PHYS/QHP EQP: CPT

## 2019-04-01 PROCEDURE — 83036 HEMOGLOBIN GLYCOSYLATED A1C: CPT | Mod: QW

## 2019-04-01 PROCEDURE — 99214 OFFICE O/P EST MOD 30 MIN: CPT

## 2019-04-04 LAB
CREAT SPEC-SCNC: 161 MG/DL
HBA1C MFR BLD HPLC: 6.3
MICROALBUMIN 24H UR DL<=1MG/L-MCNC: <1.2 MG/DL
MICROALBUMIN/CREAT 24H UR-RTO: NORMAL MG/G

## 2019-04-08 ENCOUNTER — CLINICAL ADVICE (OUTPATIENT)
Age: 74
End: 2019-04-08

## 2019-04-15 ENCOUNTER — APPOINTMENT (OUTPATIENT)
Dept: OPHTHALMOLOGY | Facility: CLINIC | Age: 74
End: 2019-04-15
Payer: MEDICARE

## 2019-04-15 PROCEDURE — 92012 INTRM OPH EXAM EST PATIENT: CPT

## 2019-04-15 PROCEDURE — 92134 CPTRZ OPH DX IMG PST SGM RTA: CPT

## 2019-04-23 ENCOUNTER — APPOINTMENT (OUTPATIENT)
Dept: ELECTROPHYSIOLOGY | Facility: CLINIC | Age: 74
End: 2019-04-23
Payer: MEDICARE

## 2019-04-23 PROCEDURE — 93295 DEV INTERROG REMOTE 1/2/MLT: CPT

## 2019-04-23 PROCEDURE — 93296 REM INTERROG EVL PM/IDS: CPT

## 2019-05-02 ENCOUNTER — APPOINTMENT (OUTPATIENT)
Dept: INTERNAL MEDICINE | Facility: CLINIC | Age: 74
End: 2019-05-02
Payer: MEDICARE

## 2019-05-02 ENCOUNTER — LABORATORY RESULT (OUTPATIENT)
Age: 74
End: 2019-05-02

## 2019-05-02 ENCOUNTER — NON-APPOINTMENT (OUTPATIENT)
Age: 74
End: 2019-05-02

## 2019-05-02 VITALS — SYSTOLIC BLOOD PRESSURE: 120 MMHG | DIASTOLIC BLOOD PRESSURE: 70 MMHG

## 2019-05-02 LAB
ALBUMIN SERPL ELPH-MCNC: 4 G/DL
ALP BLD-CCNC: 52 U/L
ALT SERPL-CCNC: 26 U/L
ANION GAP SERPL CALC-SCNC: 12 MMOL/L
AST SERPL-CCNC: 26 U/L
BASOPHILS # BLD AUTO: 0.06 K/UL
BASOPHILS NFR BLD AUTO: 0.8 %
BILIRUB SERPL-MCNC: 0.4 MG/DL
BUN SERPL-MCNC: 32 MG/DL
CALCIUM SERPL-MCNC: 10.2 MG/DL
CHLORIDE SERPL-SCNC: 104 MMOL/L
CHOLEST SERPL-MCNC: 193 MG/DL
CHOLEST/HDLC SERPL: 3.9 RATIO
CO2 SERPL-SCNC: 26 MMOL/L
CREAT SERPL-MCNC: 1.25 MG/DL
EOSINOPHIL # BLD AUTO: 0.05 K/UL
EOSINOPHIL NFR BLD AUTO: 0.7 %
ESTIMATED AVERAGE GLUCOSE: 146 MG/DL
FERRITIN SERPL-MCNC: 112 NG/ML
FOLATE SERPL-MCNC: 11.9 NG/ML
GLUCOSE SERPL-MCNC: 122 MG/DL
HBA1C MFR BLD HPLC: 6.7 %
HCT VFR BLD CALC: 42.2 %
HDLC SERPL-MCNC: 49 MG/DL
HGB BLD-MCNC: 13.4 G/DL
IMM GRANULOCYTES NFR BLD AUTO: 0.4 %
LDLC SERPL CALC-MCNC: 114 MG/DL
LYMPHOCYTES # BLD AUTO: 1.51 K/UL
LYMPHOCYTES NFR BLD AUTO: 21.2 %
MAN DIFF?: NORMAL
MCHC RBC-ENTMCNC: 31.2 PG
MCHC RBC-ENTMCNC: 31.8 GM/DL
MCV RBC AUTO: 98.1 FL
MONOCYTES # BLD AUTO: 0.59 K/UL
MONOCYTES NFR BLD AUTO: 8.3 %
NEUTROPHILS # BLD AUTO: 4.88 K/UL
NEUTROPHILS NFR BLD AUTO: 68.6 %
PLATELET # BLD AUTO: 172 K/UL
POTASSIUM SERPL-SCNC: 4.4 MMOL/L
PROT SERPL-MCNC: 7.1 G/DL
RBC # BLD: 4.3 M/UL
RBC # FLD: 14.7 %
SAVE SPECIMEN: NORMAL
SODIUM SERPL-SCNC: 142 MMOL/L
T3RU NFR SERPL: 1 TBI
T4 SERPL-MCNC: 5 UG/DL
TRIGL SERPL-MCNC: 148 MG/DL
TSH SERPL-ACNC: 1.84 UIU/ML
URATE SERPL-MCNC: 6 MG/DL
VIT B12 SERPL-MCNC: 989 PG/ML
WBC # FLD AUTO: 7.12 K/UL

## 2019-05-02 PROCEDURE — 99214 OFFICE O/P EST MOD 30 MIN: CPT | Mod: 25

## 2019-05-02 PROCEDURE — 90732 PPSV23 VACC 2 YRS+ SUBQ/IM: CPT

## 2019-05-02 PROCEDURE — G0009: CPT

## 2019-05-02 PROCEDURE — 36415 COLL VENOUS BLD VENIPUNCTURE: CPT

## 2019-05-02 PROCEDURE — 93000 ELECTROCARDIOGRAM COMPLETE: CPT

## 2019-05-02 RX ORDER — BLOOD SUGAR DIAGNOSTIC
STRIP MISCELLANEOUS 4 TIMES DAILY
Qty: 2 | Refills: 5 | Status: ACTIVE | COMMUNITY
Start: 2018-09-13

## 2019-05-02 NOTE — REVIEW OF SYSTEMS
[Fatigue] : fatigue [Vision Problems] : vision problems [Palpitations] : palpitations [Lower Ext Edema] : lower extremity edema [Dyspnea on Exertion] : dyspnea on exertion [Joint Pain] : joint pain [Joint Stiffness] : joint stiffness [Muscle Pain] : muscle pain [Dizziness] : dizziness [Negative] : Heme/Lymph [Wheezing] : no wheezing [Cough] : no cough

## 2019-05-02 NOTE — ASSESSMENT
[FreeTextEntry1] : Problem\par Diabetes mellitus\par CVA\par Diabetic retinopathy\par Hypertension\par Hypercholesterolemia\par Cervical radiculopathy\par Assessment\par This is a 73-year-old lady would've a host of medical issues most of them related to her diabetes mellitus. She has type 2 diabetes mellitus complicated by CVA, retinopathy, diabetic nephropathy, she presently feels well except for her decreased vision she denies any chest pains or shortness of breath. In addition she has hypertension and hypercholesterolemia. Her physical examination does not reveal any acute changes. Bloods were drawn to check her A1c lipids her cholesterolHer EKG reveals an anterior wall infarct. She recently has had an echo but has not seen a cardiologist in 2 years. She was referred to cardiology in consultation\par

## 2019-05-02 NOTE — HISTORY OF PRESENT ILLNESS
[de-identified] : This is a 73-year-old woman with a history of diabetes mellitus type 2 with complications of minor CVA, retinopathy, renal disease, who also in addition has cervical spine disease with radiculopathy and hypertension. She Also has a history of ASHD and status post pacemaker insertion

## 2019-05-02 NOTE — PHYSICAL EXAM
[Normal Sclera/Conjunctiva] : normal sclera/conjunctiva [PERRL] : pupils equal round and reactive to light [Normal Oropharynx] : the oropharynx was normal [Normal TMs] : both tympanic membranes were normal [No Lymphadenopathy] : no lymphadenopathy [Thyroid Normal, No Nodules] : the thyroid was normal and there were no nodules present [Clear to Auscultation] : lungs were clear to auscultation bilaterally [No Accessory Muscle Use] : no accessory muscle use [Normal Rate] : normal rate  [Regular Rhythm] : with a regular rhythm [No Carotid Bruits] : no carotid bruits [No Abdominal Bruit] : a ~M bruit was not heard ~T in the abdomen [No Varicosities] : no varicosities [Pedal Pulses Present] : the pedal pulses are present [Soft] : abdomen soft [No HSM] : no HSM [Normal Posterior Cervical Nodes] : no posterior cervical lymphadenopathy [Normal Anterior Cervical Nodes] : no anterior cervical lymphadenopathy [No CVA Tenderness] : no CVA  tenderness [No Spinal Tenderness] : no spinal tenderness [No Joint Swelling] : no joint swelling [Grossly Normal Strength/Tone] : grossly normal strength/tone [No Rash] : no rash [Normal Gait] : normal gait [Coordination Grossly Intact] : coordination grossly intact [No Focal Deficits] : no focal deficits [Deep Tendon Reflexes (DTR)] : deep tendon reflexes were 2+ and symmetric [Normal Affect] : the affect was normal [Normal Insight/Judgement] : insight and judgment were intact [Right Foot Was Examined] : Right foot ~C was examined [Left Foot Was Examined] : left foot ~C was examined [None] : no ulcers in either foot were found [] : both feet [de-identified] : AICD in Place

## 2019-05-03 LAB — 25(OH)D3 SERPL-MCNC: 37.4 NG/ML

## 2019-05-10 ENCOUNTER — CLINICAL ADVICE (OUTPATIENT)
Age: 74
End: 2019-05-10

## 2019-05-16 ENCOUNTER — RX RENEWAL (OUTPATIENT)
Age: 74
End: 2019-05-16

## 2019-06-03 ENCOUNTER — APPOINTMENT (OUTPATIENT)
Dept: OTOLARYNGOLOGY | Facility: CLINIC | Age: 74
End: 2019-06-03
Payer: MEDICARE

## 2019-06-03 VITALS
WEIGHT: 166 LBS | HEIGHT: 64 IN | HEART RATE: 64 BPM | DIASTOLIC BLOOD PRESSURE: 72 MMHG | SYSTOLIC BLOOD PRESSURE: 119 MMHG | BODY MASS INDEX: 28.34 KG/M2

## 2019-06-03 PROCEDURE — 92557 COMPREHENSIVE HEARING TEST: CPT

## 2019-06-03 PROCEDURE — 99204 OFFICE O/P NEW MOD 45 MIN: CPT | Mod: 25

## 2019-06-03 PROCEDURE — 92567 TYMPANOMETRY: CPT

## 2019-06-03 PROCEDURE — G0268 REMOVAL OF IMPACTED WAX MD: CPT

## 2019-06-04 NOTE — HISTORY OF PRESENT ILLNESS
[de-identified] : PAtient is here with progressive hearing loss in both ears vfor the last 2 years. She does not have any ear pain right now but up to last night she had pain in the ears. SHe does clean her ears at home because when eh cannot hear she tries to clean the ears to see if it helps. She has occaiosnal dizziness and vertigo but nothing today. SHe does not have any pain in the eas right now but she has occasional ringing in the ears that comes and goes \par \par She also has an isue with the neck where she has pain and stiffness along the left side of the neck especially at night

## 2019-06-04 NOTE — ASSESSMENT
[FreeTextEntry1] : Patient complaining of diminished hearing. She has some moderate amount of wax bilaterally which was curetted out. Audiogram actually showed a relatively normal hearing separate dip in the high frequencies symmetrical in nature consistent presbycusis. She was reassured and encouraged to followup with us annually.

## 2019-06-13 ENCOUNTER — RX RENEWAL (OUTPATIENT)
Age: 74
End: 2019-06-13

## 2019-07-05 ENCOUNTER — RX RENEWAL (OUTPATIENT)
Age: 74
End: 2019-07-05

## 2019-07-19 ENCOUNTER — APPOINTMENT (OUTPATIENT)
Dept: CARDIOLOGY | Facility: CLINIC | Age: 74
End: 2019-07-19
Payer: MEDICARE

## 2019-07-19 ENCOUNTER — NON-APPOINTMENT (OUTPATIENT)
Age: 74
End: 2019-07-19

## 2019-07-19 VITALS
HEIGHT: 64 IN | DIASTOLIC BLOOD PRESSURE: 74 MMHG | HEART RATE: 79 BPM | RESPIRATION RATE: 16 BRPM | WEIGHT: 161.25 LBS | BODY MASS INDEX: 27.53 KG/M2 | SYSTOLIC BLOOD PRESSURE: 128 MMHG | OXYGEN SATURATION: 96 %

## 2019-07-19 PROCEDURE — 36415 COLL VENOUS BLD VENIPUNCTURE: CPT

## 2019-07-19 PROCEDURE — 99214 OFFICE O/P EST MOD 30 MIN: CPT

## 2019-07-19 PROCEDURE — 93000 ELECTROCARDIOGRAM COMPLETE: CPT

## 2019-07-21 NOTE — PHYSICAL EXAM
[Normal Appearance] : normal appearance [Well Groomed] : well groomed [General Appearance - In No Acute Distress] : no acute distress [Normal Conjunctiva] : the conjunctiva exhibited no abnormalities [Normal Oral Mucosa] : normal oral mucosa [No Oral Pallor] : no oral pallor [Normal Oropharynx] : normal oropharynx [Respiration, Rhythm And Depth] : normal respiratory rhythm and effort [Auscultation Breath Sounds / Voice Sounds] : lungs were clear to auscultation bilaterally [Heart Rate And Rhythm] : heart rate and rhythm were normal [Heart Sounds] : normal S1 and S2 [Arterial Pulses Normal] : the arterial pulses were normal [Bowel Sounds] : normal bowel sounds [Abdomen Soft] : soft [Abdomen Tenderness] : non-tender [Nail Clubbing] : no clubbing of the fingernails [Cyanosis, Localized] : no localized cyanosis [Skin Color & Pigmentation] : normal skin color and pigmentation [Skin Turgor] : normal skin turgor [] : no rash [FreeTextEntry1] : mild cognitive impairment with memory recall

## 2019-07-21 NOTE — ASSESSMENT
[FreeTextEntry1] : 74 y/o F w/ h/o IDDM (A1c 6.5), HFrEF (fluctuating EF 25%, Dx 2011; improved to 70% 2016) s/p [employer] CRT-D, CAD s/p several PCI (last 2016; no h/o MI) referred by Dr. Zhu who has been doing well since last visit with negative cardiovascular testing since. Currently appears euvolemic and compensated and normotensive.\par \par 1. HFrEF - resolved; s/p BiV ICD with ICD 70%\par - continue lasix to 20 mg three times a week on Lmr-Quu-Immmhs and as needed for weight gain of 2 lbs\par - will d/c hydral and increase ARB to minimize polypharmacy; switch valsartan 40 mg twice/day to losartan 50 mg daily; continue with price 25 mg daily; repeat labs at medicine visit\par - will continue toprol  mg daily\par \par 2. CAD - stress test w/o ischemia; chest pian likely noncardiac\par - last PCI 2016; on ASA 81 mg daily and Effient 10 mg daily; can consider d/c'ing Effient as last stent was 2016\par - on crestor 40 mg qhs; will continue zetia 10 mg daily\par - continue toprol to 100 mg daily \par \par RTC 3 months with NP, 6 months with me

## 2019-07-22 ENCOUNTER — APPOINTMENT (OUTPATIENT)
Dept: ENDOCRINOLOGY | Facility: CLINIC | Age: 74
End: 2019-07-22
Payer: MEDICARE

## 2019-07-22 ENCOUNTER — RX RENEWAL (OUTPATIENT)
Age: 74
End: 2019-07-22

## 2019-07-22 VITALS
WEIGHT: 156 LBS | DIASTOLIC BLOOD PRESSURE: 70 MMHG | HEIGHT: 64 IN | SYSTOLIC BLOOD PRESSURE: 120 MMHG | BODY MASS INDEX: 26.63 KG/M2 | HEART RATE: 72 BPM | OXYGEN SATURATION: 97 %

## 2019-07-22 LAB
GLUCOSE BLDC GLUCOMTR-MCNC: 229
HBA1C MFR BLD HPLC: 7.6

## 2019-07-22 PROCEDURE — 99214 OFFICE O/P EST MOD 30 MIN: CPT | Mod: 25

## 2019-07-22 PROCEDURE — 83036 HEMOGLOBIN GLYCOSYLATED A1C: CPT | Mod: QW

## 2019-07-22 PROCEDURE — 82962 GLUCOSE BLOOD TEST: CPT

## 2019-07-23 ENCOUNTER — RX RENEWAL (OUTPATIENT)
Age: 74
End: 2019-07-23

## 2019-07-23 RX ORDER — GLUCOSAM/CHON-MSM1/C/MANG/BOSW 500-416.6
TABLET ORAL
Qty: 1 | Refills: 5 | Status: ACTIVE | COMMUNITY
Start: 2018-04-23 | End: 1900-01-01

## 2019-07-29 ENCOUNTER — APPOINTMENT (OUTPATIENT)
Dept: ELECTROPHYSIOLOGY | Facility: CLINIC | Age: 74
End: 2019-07-29

## 2019-07-30 LAB
ALBUMIN SERPL ELPH-MCNC: 4.4 G/DL
ALP BLD-CCNC: 58 U/L
ALT SERPL-CCNC: 22 U/L
ANION GAP SERPL CALC-SCNC: 14 MMOL/L
AST SERPL-CCNC: 23 U/L
BASOPHILS # BLD AUTO: 0.03 K/UL
BASOPHILS NFR BLD AUTO: 0.3 %
BILIRUB SERPL-MCNC: 0.4 MG/DL
BUN SERPL-MCNC: 27 MG/DL
CALCIUM SERPL-MCNC: 10.3 MG/DL
CHLORIDE SERPL-SCNC: 101 MMOL/L
CHOLEST SERPL-MCNC: 165 MG/DL
CHOLEST/HDLC SERPL: 3.4 RATIO
CO2 SERPL-SCNC: 22 MMOL/L
CREAT SERPL-MCNC: 1.32 MG/DL
EOSINOPHIL # BLD AUTO: 0 K/UL
EOSINOPHIL NFR BLD AUTO: 0 %
GLUCOSE SERPL-MCNC: 216 MG/DL
HCT VFR BLD CALC: 44.9 %
HDLC SERPL-MCNC: 49 MG/DL
HGB BLD-MCNC: 14.3 G/DL
IMM GRANULOCYTES NFR BLD AUTO: 0.4 %
LDLC SERPL CALC-MCNC: 102 MG/DL
LYMPHOCYTES # BLD AUTO: 0.83 K/UL
LYMPHOCYTES NFR BLD AUTO: 8.5 %
MAN DIFF?: NORMAL
MCHC RBC-ENTMCNC: 30.6 PG
MCHC RBC-ENTMCNC: 31.8 GM/DL
MCV RBC AUTO: 96.1 FL
MONOCYTES # BLD AUTO: 0.55 K/UL
MONOCYTES NFR BLD AUTO: 5.6 %
NEUTROPHILS # BLD AUTO: 8.31 K/UL
NEUTROPHILS NFR BLD AUTO: 85.2 %
NT-PROBNP SERPL-MCNC: 249 PG/ML
PLATELET # BLD AUTO: 187 K/UL
POTASSIUM SERPL-SCNC: 4.9 MMOL/L
PROT SERPL-MCNC: 8.2 G/DL
RBC # BLD: 4.67 M/UL
RBC # FLD: 13.4 %
SODIUM SERPL-SCNC: 137 MMOL/L
TRIGL SERPL-MCNC: 68 MG/DL
WBC # FLD AUTO: 9.76 K/UL

## 2019-07-30 RX ORDER — VALSARTAN 40 MG/1
40 TABLET, COATED ORAL
Qty: 180 | Refills: 1 | Status: DISCONTINUED | COMMUNITY
Start: 2018-03-08 | End: 2019-07-30

## 2019-07-30 RX ORDER — METOPROLOL SUCCINATE 50 MG/1
50 TABLET, EXTENDED RELEASE ORAL
Qty: 135 | Refills: 0 | Status: DISCONTINUED | COMMUNITY
Start: 2019-01-09 | End: 2019-07-30

## 2019-07-30 RX ORDER — PRASUGREL 10 MG/1
10 TABLET, FILM COATED ORAL
Qty: 90 | Refills: 1 | Status: DISCONTINUED | COMMUNITY
Start: 2018-04-02 | End: 2019-07-30

## 2019-07-30 RX ORDER — HYDRALAZINE HYDROCHLORIDE 10 MG/1
10 TABLET ORAL
Qty: 270 | Refills: 1 | Status: DISCONTINUED | COMMUNITY
Start: 2017-09-07 | End: 2019-07-30

## 2019-08-02 ENCOUNTER — APPOINTMENT (OUTPATIENT)
Dept: INTERNAL MEDICINE | Facility: CLINIC | Age: 74
End: 2019-08-02
Payer: MEDICARE

## 2019-08-02 VITALS — DIASTOLIC BLOOD PRESSURE: 70 MMHG | SYSTOLIC BLOOD PRESSURE: 120 MMHG

## 2019-08-02 VITALS — BODY MASS INDEX: 26.8 KG/M2 | HEIGHT: 64 IN | WEIGHT: 157 LBS

## 2019-08-02 PROCEDURE — 36415 COLL VENOUS BLD VENIPUNCTURE: CPT

## 2019-08-02 PROCEDURE — 99214 OFFICE O/P EST MOD 30 MIN: CPT | Mod: 25

## 2019-08-02 NOTE — PHYSICAL EXAM
[Normal] : no carotid or abdominal bruits heard, no varicosities, pedal pulses are present, no peripheral edema, no extremity clubbing or cyanosis and no palpable aorta [Left Foot Was Examined] : left foot ~C was examined [Right Foot Was Examined] : Right foot ~C was examined [None] : no ulcers in either foot were found [] : both feet

## 2019-08-02 NOTE — ASSESSMENT
[FreeTextEntry1] : Problems\par ASHD\par Tachycardia\par AICD placement\par Diabetes\par Diabetic neuropathy\par Diabetic retinopathy\par CVA\par Hip pain\par Assessment\par This is a 74-year-old woman with long history of diabetes mellitus complicated by diabetic neuropathy and diabetic nephropathy who also has a history of ASHD complicated by cardiac arrhythmias and placement of an AICD. Her vital signs are stable today on physical examination basically is unrevealing. Her main complaint today is of pain in her right hip when she walks. I requested an x-ray of the right hip and started her on a small dose of Mobic 7/2 mg p.r.n. with food for severe pain

## 2019-08-02 NOTE — REVIEW OF SYSTEMS
[Fatigue] : fatigue [Vision Problems] : vision problems [Lower Ext Edema] : lower extremity edema [Palpitations] : palpitations [Dyspnea on Exertion] : dyspnea on exertion [Joint Pain] : joint pain [Joint Stiffness] : joint stiffness [Muscle Pain] : muscle pain [Dizziness] : dizziness [Negative] : Heme/Lymph [Wheezing] : no wheezing [Cough] : no cough

## 2019-08-07 ENCOUNTER — APPOINTMENT (OUTPATIENT)
Dept: OPHTHALMOLOGY | Facility: CLINIC | Age: 74
End: 2019-08-07

## 2019-08-14 ENCOUNTER — APPOINTMENT (OUTPATIENT)
Dept: CARDIOLOGY | Facility: CLINIC | Age: 74
End: 2019-08-14

## 2019-09-06 RX ORDER — MELOXICAM 7.5 MG/1
7.5 TABLET ORAL DAILY
Qty: 20 | Refills: 0 | Status: DISCONTINUED | COMMUNITY
Start: 2019-08-02 | End: 2019-09-06

## 2019-09-18 NOTE — ED PROVIDER NOTE - CARDIAC, MLM
Go to EP GI in make an appointment with Dr Libra Flood  are today to be seen within 7 days    Observe for any increased abdominal pain or any further rectal bleeding seek help if needed by going to the emergency room if bleeding or pain is significant    Do blood work to Mondays in a row    Check stool for blood to Mondays in a row Normal rate, regular rhythm.  Heart sounds S1, S2.  No murmurs, rubs or gallops.

## 2019-10-09 ENCOUNTER — APPOINTMENT (OUTPATIENT)
Dept: CARDIOLOGY | Facility: CLINIC | Age: 74
End: 2019-10-09
Payer: MEDICARE

## 2019-10-09 VITALS
DIASTOLIC BLOOD PRESSURE: 59 MMHG | HEART RATE: 67 BPM | BODY MASS INDEX: 27.46 KG/M2 | OXYGEN SATURATION: 98 % | HEIGHT: 64 IN | SYSTOLIC BLOOD PRESSURE: 90 MMHG

## 2019-10-09 VITALS — WEIGHT: 160 LBS | BODY MASS INDEX: 27.46 KG/M2

## 2019-10-09 PROCEDURE — 93000 ELECTROCARDIOGRAM COMPLETE: CPT

## 2019-10-09 PROCEDURE — 99214 OFFICE O/P EST MOD 30 MIN: CPT

## 2019-10-09 PROCEDURE — 36415 COLL VENOUS BLD VENIPUNCTURE: CPT

## 2019-10-09 NOTE — ASSESSMENT
[FreeTextEntry1] : 75 y/o F w/ h/o IDDM (A1c 6.5), HFrEF (fluctuating EF 25%, Dx 2011; improved to 70% 2016) s/p [employer] [facility] CRT-D, CAD s/p several PCI (last 2016; no h/o MI) initially referred by Dr. Zhu who has been doing well since last visit with negative cardiovascular testing since. Currently appears euvolemic and compensated and low normotensive.\par \par 1. HFrEF - resolved; s/p BiV ICD with ICD 70%\par - continue lasix to 20 mg three times a week on Dli-Zwf-Fuuswz and as needed for weight gain of 2 lbs\par - continue losartan 50 mg daily\par - continue with price 25 mg daily; repeat labs today, will decrease furosemide if pro BNP is stable\par - will continue toprol  mg daily\par \par 2. CAD - stress test w/o ischemia; chest pian likely noncardiac\par - last PCI 2016; on ASA 81 mg daily and Effient 10 mg daily; Effient was d/ruthann last visist as last stent was 2016\par - on crestor 40 mg qhs; will continue zetia 10 mg daily\par - continue toprol to 100 mg daily \par - LLE with some swelling and discomfort, will check bilateral lower extremity Dopplers to R/O DVT\par \par RTC 2 months

## 2019-10-09 NOTE — HISTORY OF PRESENT ILLNESS
[FreeTextEntry1] : 73 y/o F w/ h/o IDDM (A1c 6.7 5/19), HFrecEF (EF 25%, Dx 2011; improved to 70%) s/p [employer] [facility] CRT-D, CAD s/p several PCI (last 2016; no h/o MI),  CVA, CKD, DM with nephropathy, HTN who is here for follow up. \par \par She was last seen in NP clinic on 7/19/19 by Dr. Chambers and had been doing well. She has tolerated the change from valsartan 40 mg bid to losartan 50 mg daily and is off Effient as cardiac stent was in 2016. Weight is stable in the range of 160 lbs. Pt states she lives at home with her daughter. She is able to walk approx 1 block with her cane and can around in her home from room to room without dyspnea. She restarted home PT.  Able to climb 9-10 stairs w/o CP/SOB. She sleeps with 2 pillows with no orthopnea. Reports some swelling in her left leg and some discomfort when it is palpated. Denies chest pain, SOB/dizziness/diaphoresis and no syncope.  \par \par Takes all medications and follows low sodium/fluid restriction. Denies palpitations/ICD shocks.

## 2019-10-09 NOTE — PHYSICAL EXAM
[Normal Appearance] : normal appearance [Well Groomed] : well groomed [General Appearance - In No Acute Distress] : no acute distress [Normal Conjunctiva] : the conjunctiva exhibited no abnormalities [Normal Oral Mucosa] : normal oral mucosa [No Oral Pallor] : no oral pallor [Normal Oropharynx] : normal oropharynx [Auscultation Breath Sounds / Voice Sounds] : lungs were clear to auscultation bilaterally [Respiration, Rhythm And Depth] : normal respiratory rhythm and effort [Heart Sounds] : normal S1 and S2 [Heart Rate And Rhythm] : heart rate and rhythm were normal [Arterial Pulses Normal] : the arterial pulses were normal [Bowel Sounds] : normal bowel sounds [Abdomen Soft] : soft [Abdomen Tenderness] : non-tender [Nail Clubbing] : no clubbing of the fingernails [Cyanosis, Localized] : no localized cyanosis [Skin Turgor] : normal skin turgor [Skin Color & Pigmentation] : normal skin color and pigmentation [] : no rash [FreeTextEntry1] : mild cognitive impairment with memory recall

## 2019-10-10 ENCOUNTER — APPOINTMENT (OUTPATIENT)
Dept: OPHTHALMOLOGY | Facility: CLINIC | Age: 74
End: 2019-10-10
Payer: MEDICARE

## 2019-10-10 ENCOUNTER — NON-APPOINTMENT (OUTPATIENT)
Age: 74
End: 2019-10-10

## 2019-10-10 LAB
ALBUMIN SERPL ELPH-MCNC: 4.2 G/DL
ALP BLD-CCNC: 56 U/L
ALT SERPL-CCNC: 18 U/L
ANION GAP SERPL CALC-SCNC: 13 MMOL/L
AST SERPL-CCNC: 23 U/L
BASOPHILS # BLD AUTO: 0.06 K/UL
BASOPHILS NFR BLD AUTO: 1 %
BILIRUB SERPL-MCNC: 0.6 MG/DL
BUN SERPL-MCNC: 29 MG/DL
CALCIUM SERPL-MCNC: 10.2 MG/DL
CHLORIDE SERPL-SCNC: 104 MMOL/L
CO2 SERPL-SCNC: 24 MMOL/L
CREAT SERPL-MCNC: 1.18 MG/DL
EOSINOPHIL # BLD AUTO: 0.06 K/UL
EOSINOPHIL NFR BLD AUTO: 1 %
GLUCOSE SERPL-MCNC: 92 MG/DL
HCT VFR BLD CALC: 42.1 %
HGB BLD-MCNC: 13.3 G/DL
IMM GRANULOCYTES NFR BLD AUTO: 0.5 %
LYMPHOCYTES # BLD AUTO: 1.64 K/UL
LYMPHOCYTES NFR BLD AUTO: 26.1 %
MAN DIFF?: NORMAL
MCHC RBC-ENTMCNC: 31.2 PG
MCHC RBC-ENTMCNC: 31.6 GM/DL
MCV RBC AUTO: 98.8 FL
MONOCYTES # BLD AUTO: 0.8 K/UL
MONOCYTES NFR BLD AUTO: 12.7 %
NEUTROPHILS # BLD AUTO: 3.69 K/UL
NEUTROPHILS NFR BLD AUTO: 58.7 %
NT-PROBNP SERPL-MCNC: 113 PG/ML
PLATELET # BLD AUTO: 166 K/UL
POTASSIUM SERPL-SCNC: 4.4 MMOL/L
PROT SERPL-MCNC: 7.7 G/DL
RBC # BLD: 4.26 M/UL
RBC # FLD: 14.2 %
SODIUM SERPL-SCNC: 141 MMOL/L
WBC # FLD AUTO: 6.28 K/UL

## 2019-10-10 PROCEDURE — 92014 COMPRE OPH EXAM EST PT 1/>: CPT

## 2019-10-11 ENCOUNTER — APPOINTMENT (OUTPATIENT)
Dept: ENDOCRINOLOGY | Facility: CLINIC | Age: 74
End: 2019-10-11
Payer: MEDICARE

## 2019-10-11 VITALS
SYSTOLIC BLOOD PRESSURE: 110 MMHG | OXYGEN SATURATION: 98 % | HEIGHT: 64 IN | DIASTOLIC BLOOD PRESSURE: 62 MMHG | BODY MASS INDEX: 27.83 KG/M2 | WEIGHT: 163 LBS | HEART RATE: 69 BPM

## 2019-10-11 PROCEDURE — 99214 OFFICE O/P EST MOD 30 MIN: CPT

## 2019-10-11 PROCEDURE — 82962 GLUCOSE BLOOD TEST: CPT

## 2019-10-11 PROCEDURE — 83036 HEMOGLOBIN GLYCOSYLATED A1C: CPT | Mod: QW

## 2019-10-18 LAB
GLUCOSE BLDC GLUCOMTR-MCNC: 89
HBA1C MFR BLD HPLC: 6.4

## 2019-10-23 ENCOUNTER — APPOINTMENT (OUTPATIENT)
Dept: ELECTROPHYSIOLOGY | Facility: CLINIC | Age: 74
End: 2019-10-23
Payer: MEDICARE

## 2019-10-23 PROCEDURE — 93296 REM INTERROG EVL PM/IDS: CPT

## 2019-10-23 PROCEDURE — 93295 DEV INTERROG REMOTE 1/2/MLT: CPT

## 2019-11-04 ENCOUNTER — MEDICATION RENEWAL (OUTPATIENT)
Age: 74
End: 2019-11-04

## 2019-11-18 ENCOUNTER — NON-APPOINTMENT (OUTPATIENT)
Age: 74
End: 2019-11-18

## 2019-11-18 ENCOUNTER — APPOINTMENT (OUTPATIENT)
Dept: OPHTHALMOLOGY | Facility: CLINIC | Age: 74
End: 2019-11-18
Payer: MEDICARE

## 2019-11-18 PROCEDURE — 92083 EXTENDED VISUAL FIELD XM: CPT

## 2019-11-18 PROCEDURE — 92133 CPTRZD OPH DX IMG PST SGM ON: CPT

## 2019-11-18 PROCEDURE — 92012 INTRM OPH EXAM EST PATIENT: CPT

## 2019-11-18 PROCEDURE — 92015 DETERMINE REFRACTIVE STATE: CPT

## 2019-11-22 NOTE — ED ADULT NURSE NOTE - PAIN RATING/NUMBER SCALE (0-10): ACTIVITY
[Mucoid Discharge] : mucoid discharge [Inflamed Nasal Mucosa] : inflamed nasal mucosa [Warm, Well Perfused x4] : warm, well perfused x4 [Capillary Refill <2s] : capillary refill < 2s [NL] : warm 10

## 2019-12-02 ENCOUNTER — APPOINTMENT (OUTPATIENT)
Dept: INTERNAL MEDICINE | Facility: CLINIC | Age: 74
End: 2019-12-02
Payer: MEDICARE

## 2019-12-02 VITALS — HEIGHT: 64 IN | BODY MASS INDEX: 26.12 KG/M2 | WEIGHT: 153 LBS

## 2019-12-02 VITALS — DIASTOLIC BLOOD PRESSURE: 70 MMHG | SYSTOLIC BLOOD PRESSURE: 110 MMHG

## 2019-12-02 PROCEDURE — 99214 OFFICE O/P EST MOD 30 MIN: CPT

## 2019-12-02 NOTE — HISTORY OF PRESENT ILLNESS
[de-identified] : This is a 74-year-old woman with a history of diabetes mellitus, chronic renal disease, hypertension, Mild CVA, diabetes,

## 2019-12-02 NOTE — ASSESSMENT
[FreeTextEntry1] : Problems\par Hypertension\par Diabetes\par CVA\par Cervical myelopathy\par Diabetic polyneuropathy\par Assessment\par I spoke to the patient regarding diet and the importance of diet on her diabetes hypertension and polyneuropathy. I explained to her importance of keeping A1c below 7. Her blood pressure today was well controlled at 130/70. The patient has an AICD in place which is being followed by her cardiologist. The patient today denies any chest pains or shortness of breath.

## 2019-12-02 NOTE — REVIEW OF SYSTEMS
[Fatigue] : fatigue [Vision Problems] : vision problems [Lower Ext Edema] : lower extremity edema [Palpitations] : palpitations [Wheezing] : no wheezing [Cough] : no cough [Dyspnea on Exertion] : dyspnea on exertion [Joint Pain] : joint pain [Joint Stiffness] : joint stiffness [Muscle Pain] : muscle pain [Dizziness] : dizziness [Negative] : Heme/Lymph

## 2019-12-06 ENCOUNTER — RX RENEWAL (OUTPATIENT)
Age: 74
End: 2019-12-06

## 2019-12-11 ENCOUNTER — APPOINTMENT (OUTPATIENT)
Dept: CARDIOLOGY | Facility: CLINIC | Age: 74
End: 2019-12-11

## 2020-01-01 NOTE — HISTORY OF PRESENT ILLNESS
[de-identified] : This is a 74-year-old woman with a history of severe ASHD status post AICD placement, in addition she has diabetes mellitus complicated by diabetic neuropathy diabetic renal insufficiency and hypertension. She is status post small CVA. She presently complains of pain in her right hip which can be severe. Statement Selected

## 2020-01-05 ENCOUNTER — RX RENEWAL (OUTPATIENT)
Age: 75
End: 2020-01-05

## 2020-01-22 ENCOUNTER — APPOINTMENT (OUTPATIENT)
Dept: ELECTROPHYSIOLOGY | Facility: CLINIC | Age: 75
End: 2020-01-22
Payer: MEDICARE

## 2020-01-22 ENCOUNTER — NON-APPOINTMENT (OUTPATIENT)
Age: 75
End: 2020-01-22

## 2020-01-22 ENCOUNTER — APPOINTMENT (OUTPATIENT)
Dept: CARDIOLOGY | Facility: CLINIC | Age: 75
End: 2020-01-22
Payer: MEDICARE

## 2020-01-22 VITALS
SYSTOLIC BLOOD PRESSURE: 119 MMHG | DIASTOLIC BLOOD PRESSURE: 75 MMHG | OXYGEN SATURATION: 98 % | WEIGHT: 155 LBS | HEART RATE: 62 BPM | HEIGHT: 64 IN | BODY MASS INDEX: 26.46 KG/M2

## 2020-01-22 PROCEDURE — 36415 COLL VENOUS BLD VENIPUNCTURE: CPT

## 2020-01-22 PROCEDURE — 93295 DEV INTERROG REMOTE 1/2/MLT: CPT

## 2020-01-22 PROCEDURE — 93000 ELECTROCARDIOGRAM COMPLETE: CPT

## 2020-01-22 PROCEDURE — 99214 OFFICE O/P EST MOD 30 MIN: CPT

## 2020-01-22 PROCEDURE — 93296 REM INTERROG EVL PM/IDS: CPT

## 2020-01-24 NOTE — HISTORY OF PRESENT ILLNESS
[FreeTextEntry1] : 73 y/o F w/ h/o IDDM (A1c 6.7 5/19), HFrecEF (EF 25%, Dx 2011; improved to 70%) s/p [facility] [facility] CRT-D, CAD s/p several PCI (last 2016; no h/o MI),  CVA, CKD, DM with nephropathy (A1c 6.4 10/19), HTN who is here for follow up. \par \par Last seen by NP 10/9/19 where she had been doing well. Weight is stable in the range of 155-157 lbs. Pt states she lives at home with her daughter. She is able to walk approx 1 block with her cane and hasn't been walking much due to weather. Able to climb 9-10 stairs w/o CP/SOB but gets tired. She sleeps with 2 pillows with no orthopnea. Denies chest pain, SOB/dizziness/diaphoresis and no syncope.  \par \par Takes all medications and follows low sodium/fluid restriction. Denies palpitations/ICD shocks. \par \par Reports some pain in RUQ that occurs intermittently, sometimes associated with food. Denies nausea/fevers/chills. Reports constipation (at times a week).

## 2020-01-24 NOTE — PHYSICAL EXAM
[Well Groomed] : well groomed [Normal Appearance] : normal appearance [Normal Conjunctiva] : the conjunctiva exhibited no abnormalities [General Appearance - In No Acute Distress] : no acute distress [No Oral Pallor] : no oral pallor [Normal Oral Mucosa] : normal oral mucosa [Normal Oropharynx] : normal oropharynx [Respiration, Rhythm And Depth] : normal respiratory rhythm and effort [Auscultation Breath Sounds / Voice Sounds] : lungs were clear to auscultation bilaterally [Heart Sounds] : normal S1 and S2 [Heart Rate And Rhythm] : heart rate and rhythm were normal [Arterial Pulses Normal] : the arterial pulses were normal [Abdomen Soft] : soft [Bowel Sounds] : normal bowel sounds [Nail Clubbing] : no clubbing of the fingernails [Abdomen Tenderness] : non-tender [Cyanosis, Localized] : no localized cyanosis [Skin Color & Pigmentation] : normal skin color and pigmentation [Skin Turgor] : normal skin turgor [] : no rash [FreeTextEntry1] : mild cognitive impairment with memory recall

## 2020-01-24 NOTE — ASSESSMENT
[FreeTextEntry1] : 73 y/o F w/ h/o IDDM (A1c 6.5), HFrecEF (fluctuating EF 25%, Dx 2011; improved to 70% 2016) s/p [facility] [facility] CRT-D, CAD s/p several PCI (last 2016; no h/o MI) initially referred by Dr. Zhu who has been doing well since last visit. Currently appears euvolemic and compensated and normotensive.\par \par 1. HFrEF - resolved; s/p BiV ICD with ICD 70%\par - continue lasix 20 mg three times a week on Cxt-Fkn-Dwpezo and as needed for weight gain of 2 lbs\par - continue losartan 50 mg daily\par - continue with price 25 mg daily; repeat labs checked in office and reviewed with patient\par - will continue toprol  mg daily\par \par 2. CAD - stress test w/o ischemia 2018\par - last PCI 2016; on ASA 81 mg daily\par - on crestor 40 mg qhs and zetia 10 mg daily; lipid panel above goal  \par - continue toprol to 100 mg daily \par \par RTC 6 months

## 2020-01-29 LAB
ALBUMIN SERPL ELPH-MCNC: 4.1 G/DL
ALP BLD-CCNC: 59 U/L
ALT SERPL-CCNC: 18 U/L
ANION GAP SERPL CALC-SCNC: 16 MMOL/L
AST SERPL-CCNC: 25 U/L
BASOPHILS # BLD AUTO: 0.05 K/UL
BASOPHILS NFR BLD AUTO: 0.7 %
BILIRUB SERPL-MCNC: 0.5 MG/DL
BUN SERPL-MCNC: 30 MG/DL
CALCIUM SERPL-MCNC: 10.1 MG/DL
CHLORIDE SERPL-SCNC: 104 MMOL/L
CHOLEST SERPL-MCNC: 226 MG/DL
CHOLEST/HDLC SERPL: 4.6 RATIO
CO2 SERPL-SCNC: 23 MMOL/L
CREAT SERPL-MCNC: 1.23 MG/DL
EOSINOPHIL # BLD AUTO: 0.08 K/UL
EOSINOPHIL NFR BLD AUTO: 1.1 %
ESTIMATED AVERAGE GLUCOSE: 137 MG/DL
GLUCOSE SERPL-MCNC: 70 MG/DL
HBA1C MFR BLD HPLC: 6.4 %
HCT VFR BLD CALC: 42.8 %
HDLC SERPL-MCNC: 49 MG/DL
HGB BLD-MCNC: 13.6 G/DL
IMM GRANULOCYTES NFR BLD AUTO: 0.4 %
LDLC SERPL CALC-MCNC: 155 MG/DL
LYMPHOCYTES # BLD AUTO: 2.01 K/UL
LYMPHOCYTES NFR BLD AUTO: 28.2 %
MAN DIFF?: NORMAL
MCHC RBC-ENTMCNC: 30.9 PG
MCHC RBC-ENTMCNC: 31.8 GM/DL
MCV RBC AUTO: 97.3 FL
MONOCYTES # BLD AUTO: 0.82 K/UL
MONOCYTES NFR BLD AUTO: 11.5 %
NEUTROPHILS # BLD AUTO: 4.15 K/UL
NEUTROPHILS NFR BLD AUTO: 58.1 %
NT-PROBNP SERPL-MCNC: 179 PG/ML
PLATELET # BLD AUTO: 157 K/UL
POTASSIUM SERPL-SCNC: 4.5 MMOL/L
PROT SERPL-MCNC: 7.6 G/DL
RBC # BLD: 4.4 M/UL
RBC # FLD: 14.4 %
SODIUM SERPL-SCNC: 142 MMOL/L
TRIGL SERPL-MCNC: 109 MG/DL
WBC # FLD AUTO: 7.14 K/UL

## 2020-03-03 ENCOUNTER — NON-APPOINTMENT (OUTPATIENT)
Age: 75
End: 2020-03-03

## 2020-03-03 ENCOUNTER — APPOINTMENT (OUTPATIENT)
Dept: ELECTROPHYSIOLOGY | Facility: CLINIC | Age: 75
End: 2020-03-03
Payer: MEDICARE

## 2020-03-03 VITALS
SYSTOLIC BLOOD PRESSURE: 126 MMHG | OXYGEN SATURATION: 98 % | WEIGHT: 155 LBS | HEIGHT: 64 IN | DIASTOLIC BLOOD PRESSURE: 71 MMHG | BODY MASS INDEX: 26.46 KG/M2

## 2020-03-03 LAB
ALBUMIN SERPL ELPH-MCNC: 4.5 G/DL
ALP BLD-CCNC: 67 U/L
ALT SERPL-CCNC: 56 U/L
ANION GAP SERPL CALC-SCNC: 12 MMOL/L
AST SERPL-CCNC: 32 U/L
BASOPHILS # BLD AUTO: 0.04 K/UL
BASOPHILS NFR BLD AUTO: 0.4 %
BILIRUB SERPL-MCNC: 0.7 MG/DL
BUN SERPL-MCNC: 28 MG/DL
CALCIUM SERPL-MCNC: 10.7 MG/DL
CHLORIDE SERPL-SCNC: 102 MMOL/L
CO2 SERPL-SCNC: 24 MMOL/L
CREAT SERPL-MCNC: 1.23 MG/DL
EOSINOPHIL # BLD AUTO: 0.05 K/UL
EOSINOPHIL NFR BLD AUTO: 0.5 %
GLUCOSE SERPL-MCNC: 206 MG/DL
HCT VFR BLD CALC: 46 %
HGB BLD-MCNC: 14.8 G/DL
IMM GRANULOCYTES NFR BLD AUTO: 0.5 %
LYMPHOCYTES # BLD AUTO: 2.04 K/UL
LYMPHOCYTES NFR BLD AUTO: 20.6 %
MAN DIFF?: NORMAL
MCHC RBC-ENTMCNC: 30.8 PG
MCHC RBC-ENTMCNC: 32.2 GM/DL
MCV RBC AUTO: 95.6 FL
MONOCYTES # BLD AUTO: 0.88 K/UL
MONOCYTES NFR BLD AUTO: 8.9 %
NEUTROPHILS # BLD AUTO: 6.86 K/UL
NEUTROPHILS NFR BLD AUTO: 69.1 %
PLATELET # BLD AUTO: 166 K/UL
POTASSIUM SERPL-SCNC: 5 MMOL/L
PROT SERPL-MCNC: 8 G/DL
RBC # BLD: 4.81 M/UL
RBC # FLD: 13.5 %
SODIUM SERPL-SCNC: 138 MMOL/L
WBC # FLD AUTO: 9.92 K/UL

## 2020-03-03 PROCEDURE — 93284 PRGRMG EVAL IMPLANTABLE DFB: CPT

## 2020-03-03 PROCEDURE — 93000 ELECTROCARDIOGRAM COMPLETE: CPT | Mod: 59

## 2020-03-03 PROCEDURE — 99215 OFFICE O/P EST HI 40 MIN: CPT

## 2020-03-03 PROCEDURE — 93290 INTERROG DEV EVAL ICPMS IP: CPT | Mod: 59

## 2020-03-03 PROCEDURE — 93290 INTERROG DEV EVAL ICPMS IP: CPT

## 2020-03-03 NOTE — PHYSICAL EXAM
[General Appearance - Well Developed] : well developed [Normal Appearance] : normal appearance [Well Groomed] : well groomed [General Appearance - In No Acute Distress] : no acute distress [General Appearance - Well Nourished] : well nourished [No Deformities] : no deformities [Eyelids - No Xanthelasma] : the eyelids demonstrated no xanthelasmas [Normal Conjunctiva] : the conjunctiva exhibited no abnormalities [Normal Oral Mucosa] : normal oral mucosa [No Oral Pallor] : no oral pallor [No Oral Cyanosis] : no oral cyanosis [Normal Jugular Venous A Waves Present] : normal jugular venous A waves present [No Jugular Venous Humphrey A Waves] : no jugular venous humphrey A waves [Normal Jugular Venous V Waves Present] : normal jugular venous V waves present [Respiration, Rhythm And Depth] : normal respiratory rhythm and effort [Auscultation Breath Sounds / Voice Sounds] : lungs were clear to auscultation bilaterally [Exaggerated Use Of Accessory Muscles For Inspiration] : no accessory muscle use [Heart Sounds] : normal S1 and S2 [Heart Rate And Rhythm] : heart rate and rhythm were normal [Murmurs] : no murmurs present [Abdomen Soft] : soft [Abdomen Mass (___ Cm)] : no abdominal mass palpated [Abdomen Tenderness] : non-tender [Gait - Sufficient For Exercise Testing] : the gait was sufficient for exercise testing [Abnormal Walk] : normal gait [Cyanosis, Localized] : no localized cyanosis [Nail Clubbing] : no clubbing of the fingernails [Petechial Hemorrhages (___cm)] : no petechial hemorrhages [] : no rash [Skin Color & Pigmentation] : normal skin color and pigmentation [No Venous Stasis] : no venous stasis [No Skin Ulcers] : no skin ulcer [Skin Lesions] : no skin lesions [No Xanthoma] : no  xanthoma was observed [Oriented To Time, Place, And Person] : oriented to person, place, and time [Affect] : the affect was normal [Mood] : the mood was normal [No Anxiety] : not feeling anxious [FreeTextEntry1] : trace ankle edema

## 2020-03-03 NOTE — HISTORY OF PRESENT ILLNESS
[FreeTextEntry1] : Ankit Chambers MD\par Kyle Mejia MD\par \par Ms. JANKI NEWSOME is a 73 y/o F w/ h/o IDDM (A1c 6.7 5/19), HFrecEF (EF 25%, Dx 2011; improved to 70%) s/p [facility] [facility] CRT-D, CAD s/p several PCI (last 2016; no h/o MI), CVA, CKD, DM with nephropathy (A1c 6.4 10/19), HTN who is here because her device is at [employer]. She has not had chest pain, dyspnea, palpitations, lightheadedness, syncope, near syncope or unusual fatigue.

## 2020-03-03 NOTE — DISCUSSION/SUMMARY
[FreeTextEntry1] : In summary Ms. JANKI NEWSOME is a 73 y/o F w/ h/o IDDM (A1c 6.7 5/19), HFrecEF (EF 25%, Dx 2011; improved to 70%) s/p [facility] CRT-D, CAD s/p several PCI (last 2016; no h/o MI), CVA, CKD, DM with nephropathy (A1c 6.4 10/19), HTN who is here because her device is at [employer]. She has not had chest pain, dyspnea, palpitations, lightheadedness, syncope, near syncope or unusual fatigue. I recommended that she undergo a biVICD generator change.  Risks, benefits and alternatives discussed and patient agrees to proceed. \par \par Sincerely,\par \par Lowell Hood MD

## 2020-03-03 NOTE — REVIEW OF SYSTEMS
[Eyeglasses] : currently wearing eyeglasses [Lower Ext Edema] : lower extremity edema [Negative] : Heme/Lymph [FreeTextEntry1] : Head shaking secondary to prior to trauma

## 2020-03-06 ENCOUNTER — APPOINTMENT (OUTPATIENT)
Dept: ENDOCRINOLOGY | Facility: CLINIC | Age: 75
End: 2020-03-06
Payer: MEDICARE

## 2020-03-06 VITALS
WEIGHT: 155 LBS | BODY MASS INDEX: 26.46 KG/M2 | DIASTOLIC BLOOD PRESSURE: 70 MMHG | SYSTOLIC BLOOD PRESSURE: 118 MMHG | OXYGEN SATURATION: 97 % | HEART RATE: 77 BPM | HEIGHT: 64 IN

## 2020-03-06 PROCEDURE — 95251 CONT GLUC MNTR ANALYSIS I&R: CPT

## 2020-03-06 PROCEDURE — 99214 OFFICE O/P EST MOD 30 MIN: CPT | Mod: 25

## 2020-03-10 ENCOUNTER — APPOINTMENT (OUTPATIENT)
Dept: INTERNAL MEDICINE | Facility: CLINIC | Age: 75
End: 2020-03-10
Payer: MEDICARE

## 2020-03-10 VITALS — BODY MASS INDEX: 26.46 KG/M2 | WEIGHT: 155 LBS | HEIGHT: 64 IN

## 2020-03-10 VITALS — SYSTOLIC BLOOD PRESSURE: 120 MMHG | DIASTOLIC BLOOD PRESSURE: 70 MMHG

## 2020-03-10 PROCEDURE — 99214 OFFICE O/P EST MOD 30 MIN: CPT

## 2020-03-10 NOTE — PHYSICAL EXAM
[Normal] : no carotid or abdominal bruits heard, no varicosities, pedal pulses are present, no peripheral edema, no extremity clubbing or cyanosis and no palpable aorta [Right Foot Was Examined] : Right foot ~C was examined [Left Foot Was Examined] : left foot ~C was examined [None] : no ulcers in either foot were found [] : both feet

## 2020-03-10 NOTE — REVIEW OF SYSTEMS
[Fatigue] : fatigue [Vision Problems] : vision problems [Palpitations] : palpitations [Lower Ext Edema] : lower extremity edema [Wheezing] : no wheezing [Cough] : no cough [Dyspnea on Exertion] : dyspnea on exertion [Joint Pain] : joint pain [Joint Stiffness] : joint stiffness [Muscle Pain] : muscle pain [Dizziness] : dizziness [Negative] : Heme/Lymph

## 2020-03-10 NOTE — ASSESSMENT
[FreeTextEntry1] : Problems\par Hypertension\par Diabetes\par CVA\par Cervical myelopathy\par Diabetic polyneuropathy\par Assessment\par I spoke to the patient regarding diet and the importance of diet on her diabetes hypertension and polyneuropathy. I explained to her importance of keeping A1c below 7. Her blood pressure today was well controlled at 130/70. The patient has an AICD in place which is being followed by her cardiologist. The patient today denies any chest pains or shortness of breath.\par 3/10/2020\par This is a patient who recently saw her cardiologist who advised replacement of her AICD as well. Where it was nonfunctioning. In addition she saw her endocrinologist and her A1c was well controlled at 6.2. The patient is most adherent with her low cholesterol low sugar and low sodium diet. Her blood pressure today was 130/60. I explained to her importance of following her sodium and low cholesterol diet. In addition she understands the importance of adhering to taking her insulin therapy

## 2020-03-10 NOTE — HISTORY OF PRESENT ILLNESS
[de-identified] : This is a patient with a history of chronic renal disease, CVA, diabetes mellitus, hypertension who is here for followup

## 2020-03-12 LAB
25(OH)D3 SERPL-MCNC: 32.9 NG/ML
ALBUMIN SERPL ELPH-MCNC: 4.2 G/DL
ALP BLD-CCNC: 65 U/L
ALT SERPL-CCNC: 61 U/L
ANION GAP SERPL CALC-SCNC: 12 MMOL/L
AST SERPL-CCNC: 41 U/L
BILIRUB SERPL-MCNC: 0.9 MG/DL
BUN SERPL-MCNC: 30 MG/DL
CALCIUM SERPL-MCNC: 9.6 MG/DL
CALCIUM SERPL-MCNC: 9.6 MG/DL
CHLORIDE SERPL-SCNC: 101 MMOL/L
CO2 SERPL-SCNC: 25 MMOL/L
CREAT SERPL-MCNC: 1.29 MG/DL
CREAT SPEC-SCNC: 110 MG/DL
GLUCOSE SERPL-MCNC: 169 MG/DL
MICROALBUMIN 24H UR DL<=1MG/L-MCNC: <1.2 MG/DL
MICROALBUMIN/CREAT 24H UR-RTO: NORMAL MG/G
PARATHYROID HORMONE INTACT: 69 PG/ML
POTASSIUM SERPL-SCNC: 4.7 MMOL/L
PROT SERPL-MCNC: 7.1 G/DL
SODIUM SERPL-SCNC: 138 MMOL/L

## 2020-03-15 LAB
M TB IFN-G BLD-IMP: NEGATIVE
QUANTIFERON TB PLUS MITOGEN MINUS NIL: 5.24 IU/ML
QUANTIFERON TB PLUS NIL: 2.13 IU/ML
QUANTIFERON TB PLUS TB1 MINUS NIL: 0.16 IU/ML
QUANTIFERON TB PLUS TB2 MINUS NIL: 0.04 IU/ML

## 2020-05-01 ENCOUNTER — OUTPATIENT (OUTPATIENT)
Dept: OUTPATIENT SERVICES | Facility: HOSPITAL | Age: 75
LOS: 1 days | End: 2020-05-01
Payer: MEDICARE

## 2020-05-01 DIAGNOSIS — Z95.5 PRESENCE OF CORONARY ANGIOPLASTY IMPLANT AND GRAFT: Chronic | ICD-10-CM

## 2020-05-01 DIAGNOSIS — Z95.810 PRESENCE OF AUTOMATIC (IMPLANTABLE) CARDIAC DEFIBRILLATOR: Chronic | ICD-10-CM

## 2020-05-01 DIAGNOSIS — Z95.0 PRESENCE OF CARDIAC PACEMAKER: Chronic | ICD-10-CM

## 2020-05-01 PROCEDURE — G9001: CPT

## 2020-05-13 ENCOUNTER — APPOINTMENT (OUTPATIENT)
Dept: ELECTROPHYSIOLOGY | Facility: CLINIC | Age: 75
End: 2020-05-13

## 2020-05-18 ENCOUNTER — OUTPATIENT (OUTPATIENT)
Dept: OUTPATIENT SERVICES | Facility: HOSPITAL | Age: 75
LOS: 1 days | Discharge: HOME | End: 2020-05-18
Payer: MEDICARE

## 2020-05-18 ENCOUNTER — TRANSCRIPTION ENCOUNTER (OUTPATIENT)
Age: 75
End: 2020-05-18

## 2020-05-18 VITALS
TEMPERATURE: 98 F | RESPIRATION RATE: 18 BRPM | SYSTOLIC BLOOD PRESSURE: 103 MMHG | HEART RATE: 72 BPM | DIASTOLIC BLOOD PRESSURE: 77 MMHG | OXYGEN SATURATION: 100 %

## 2020-05-18 VITALS
OXYGEN SATURATION: 100 % | TEMPERATURE: 99 F | RESPIRATION RATE: 17 BRPM | SYSTOLIC BLOOD PRESSURE: 131 MMHG | HEART RATE: 66 BPM | DIASTOLIC BLOOD PRESSURE: 67 MMHG

## 2020-05-18 DIAGNOSIS — Z95.0 PRESENCE OF CARDIAC PACEMAKER: Chronic | ICD-10-CM

## 2020-05-18 DIAGNOSIS — Z95.810 PRESENCE OF AUTOMATIC (IMPLANTABLE) CARDIAC DEFIBRILLATOR: Chronic | ICD-10-CM

## 2020-05-18 DIAGNOSIS — I50.22 CHRONIC SYSTOLIC (CONGESTIVE) HEART FAILURE: ICD-10-CM

## 2020-05-18 DIAGNOSIS — Z95.5 PRESENCE OF CORONARY ANGIOPLASTY IMPLANT AND GRAFT: Chronic | ICD-10-CM

## 2020-05-18 LAB
ANION GAP SERPL CALC-SCNC: 13 MMO/L — SIGNIFICANT CHANGE UP (ref 7–14)
BUN SERPL-MCNC: 21 MG/DL — SIGNIFICANT CHANGE UP (ref 7–23)
CALCIUM SERPL-MCNC: 10.1 MG/DL — SIGNIFICANT CHANGE UP (ref 8.4–10.5)
CHLORIDE SERPL-SCNC: 102 MMOL/L — SIGNIFICANT CHANGE UP (ref 98–107)
CO2 SERPL-SCNC: 25 MMOL/L — SIGNIFICANT CHANGE UP (ref 22–31)
CREAT SERPL-MCNC: 1.18 MG/DL — SIGNIFICANT CHANGE UP (ref 0.5–1.3)
GLUCOSE BLDC GLUCOMTR-MCNC: 117 MG/DL — HIGH (ref 70–99)
GLUCOSE BLDC GLUCOMTR-MCNC: 142 MG/DL — HIGH (ref 70–99)
GLUCOSE BLDC GLUCOMTR-MCNC: 210 MG/DL — HIGH (ref 70–99)
GLUCOSE BLDC GLUCOMTR-MCNC: 218 MG/DL — HIGH (ref 70–99)
GLUCOSE SERPL-MCNC: 162 MG/DL — HIGH (ref 70–99)
HCT VFR BLD CALC: 41.1 % — SIGNIFICANT CHANGE UP (ref 34.5–45)
HGB BLD-MCNC: 13.4 G/DL — SIGNIFICANT CHANGE UP (ref 11.5–15.5)
MAGNESIUM SERPL-MCNC: 2 MG/DL — SIGNIFICANT CHANGE UP (ref 1.6–2.6)
MCHC RBC-ENTMCNC: 31.3 PG — SIGNIFICANT CHANGE UP (ref 27–34)
MCHC RBC-ENTMCNC: 32.6 % — SIGNIFICANT CHANGE UP (ref 32–36)
MCV RBC AUTO: 96 FL — SIGNIFICANT CHANGE UP (ref 80–100)
NRBC # FLD: 0 K/UL — SIGNIFICANT CHANGE UP (ref 0–0)
PHOSPHATE SERPL-MCNC: 3.7 MG/DL — SIGNIFICANT CHANGE UP (ref 2.5–4.5)
PLATELET # BLD AUTO: 136 K/UL — LOW (ref 150–400)
PMV BLD: 10.9 FL — SIGNIFICANT CHANGE UP (ref 7–13)
POTASSIUM SERPL-MCNC: 4.2 MMOL/L — SIGNIFICANT CHANGE UP (ref 3.5–5.3)
POTASSIUM SERPL-SCNC: 4.2 MMOL/L — SIGNIFICANT CHANGE UP (ref 3.5–5.3)
RBC # BLD: 4.28 M/UL — SIGNIFICANT CHANGE UP (ref 3.8–5.2)
RBC # FLD: 13.7 % — SIGNIFICANT CHANGE UP (ref 10.3–14.5)
SODIUM SERPL-SCNC: 140 MMOL/L — SIGNIFICANT CHANGE UP (ref 135–145)
WBC # BLD: 7.61 K/UL — SIGNIFICANT CHANGE UP (ref 3.8–10.5)
WBC # FLD AUTO: 7.61 K/UL — SIGNIFICANT CHANGE UP (ref 3.8–10.5)

## 2020-05-18 PROCEDURE — 93010 ELECTROCARDIOGRAM REPORT: CPT

## 2020-05-18 RX ORDER — INSULIN LISPRO 100/ML
6 VIAL (ML) SUBCUTANEOUS
Refills: 0 | Status: DISCONTINUED | OUTPATIENT
Start: 2020-05-18 | End: 2020-05-18

## 2020-05-18 RX ORDER — SPIRONOLACTONE 25 MG/1
25 TABLET, FILM COATED ORAL DAILY
Refills: 0 | Status: DISCONTINUED | OUTPATIENT
Start: 2020-05-19 | End: 2020-05-18

## 2020-05-18 RX ORDER — DEXTROSE 50 % IN WATER 50 %
25 SYRINGE (ML) INTRAVENOUS ONCE
Refills: 0 | Status: DISCONTINUED | OUTPATIENT
Start: 2020-05-18 | End: 2020-05-18

## 2020-05-18 RX ORDER — SODIUM CHLORIDE 9 MG/ML
3 INJECTION INTRAMUSCULAR; INTRAVENOUS; SUBCUTANEOUS EVERY 8 HOURS
Refills: 0 | Status: DISCONTINUED | OUTPATIENT
Start: 2020-05-18 | End: 2020-05-18

## 2020-05-18 RX ORDER — LOSARTAN POTASSIUM 100 MG/1
50 TABLET, FILM COATED ORAL DAILY
Refills: 0 | Status: DISCONTINUED | OUTPATIENT
Start: 2020-05-19 | End: 2020-05-18

## 2020-05-18 RX ORDER — ROSUVASTATIN CALCIUM 5 MG/1
1 TABLET ORAL
Qty: 0 | Refills: 0 | DISCHARGE

## 2020-05-18 RX ORDER — CEPHALEXIN 500 MG
500 CAPSULE ORAL EVERY 12 HOURS
Refills: 0 | Status: DISCONTINUED | OUTPATIENT
Start: 2020-05-18 | End: 2020-05-18

## 2020-05-18 RX ORDER — INSULIN ASPART 100 [IU]/ML
7 INJECTION, SOLUTION SUBCUTANEOUS
Qty: 0 | Refills: 0 | DISCHARGE

## 2020-05-18 RX ORDER — DEXTROSE 50 % IN WATER 50 %
12.5 SYRINGE (ML) INTRAVENOUS ONCE
Refills: 0 | Status: DISCONTINUED | OUTPATIENT
Start: 2020-05-18 | End: 2020-05-18

## 2020-05-18 RX ORDER — DEXTROSE 50 % IN WATER 50 %
15 SYRINGE (ML) INTRAVENOUS ONCE
Refills: 0 | Status: DISCONTINUED | OUTPATIENT
Start: 2020-05-18 | End: 2020-05-18

## 2020-05-18 RX ORDER — GLUCAGON INJECTION, SOLUTION 0.5 MG/.1ML
1 INJECTION, SOLUTION SUBCUTANEOUS ONCE
Refills: 0 | Status: DISCONTINUED | OUTPATIENT
Start: 2020-05-18 | End: 2020-05-18

## 2020-05-18 RX ORDER — POTASSIUM CHLORIDE 20 MEQ
1 PACKET (EA) ORAL
Qty: 0 | Refills: 0 | DISCHARGE

## 2020-05-18 RX ORDER — ASPIRIN/CALCIUM CARB/MAGNESIUM 324 MG
81 TABLET ORAL DAILY
Refills: 0 | Status: DISCONTINUED | OUTPATIENT
Start: 2020-05-18 | End: 2020-05-18

## 2020-05-18 RX ORDER — INSULIN LISPRO 100/ML
VIAL (ML) SUBCUTANEOUS
Refills: 0 | Status: DISCONTINUED | OUTPATIENT
Start: 2020-05-18 | End: 2020-05-18

## 2020-05-18 RX ORDER — INSULIN LISPRO 100/ML
VIAL (ML) SUBCUTANEOUS AT BEDTIME
Refills: 0 | Status: DISCONTINUED | OUTPATIENT
Start: 2020-05-18 | End: 2020-05-18

## 2020-05-18 RX ORDER — CEPHALEXIN 500 MG
1 CAPSULE ORAL
Qty: 0 | Refills: 0 | DISCHARGE
Start: 2020-05-18

## 2020-05-18 RX ORDER — SODIUM CHLORIDE 9 MG/ML
1000 INJECTION, SOLUTION INTRAVENOUS
Refills: 0 | Status: DISCONTINUED | OUTPATIENT
Start: 2020-05-18 | End: 2020-05-18

## 2020-05-18 RX ORDER — INSULIN DETEMIR 100/ML (3)
18 INSULIN PEN (ML) SUBCUTANEOUS AT BEDTIME
Refills: 0 | Status: DISCONTINUED | OUTPATIENT
Start: 2020-05-18 | End: 2020-05-18

## 2020-05-18 RX ORDER — FUROSEMIDE 40 MG
20 TABLET ORAL DAILY
Refills: 0 | Status: DISCONTINUED | OUTPATIENT
Start: 2020-05-19 | End: 2020-05-18

## 2020-05-18 RX ORDER — INSULIN DETEMIR 100/ML (3)
32 INSULIN PEN (ML) SUBCUTANEOUS
Qty: 0 | Refills: 0 | DISCHARGE

## 2020-05-18 RX ORDER — METOPROLOL TARTRATE 50 MG
100 TABLET ORAL DAILY
Refills: 0 | Status: DISCONTINUED | OUTPATIENT
Start: 2020-05-18 | End: 2020-05-18

## 2020-05-18 RX ORDER — NEPAFENAC 3 MG/ML
1 SUSPENSION OPHTHALMIC
Qty: 0 | Refills: 0 | DISCHARGE

## 2020-05-18 RX ORDER — ISOSORBIDE DINITRATE 5 MG/1
20 TABLET ORAL THREE TIMES A DAY
Refills: 0 | Status: DISCONTINUED | OUTPATIENT
Start: 2020-05-19 | End: 2020-05-18

## 2020-05-18 RX ADMIN — SODIUM CHLORIDE 3 MILLILITER(S): 9 INJECTION INTRAMUSCULAR; INTRAVENOUS; SUBCUTANEOUS at 22:16

## 2020-05-18 RX ADMIN — Medication 500 MILLIGRAM(S): at 22:11

## 2020-05-18 RX ADMIN — Medication 18 UNIT(S): at 22:16

## 2020-05-18 NOTE — DISCHARGE NOTE PROVIDER - CARE PROVIDER_API CALL
Lowell Hood  CARDIAC ELECTROPHYSIOLOGY  [address] [address] [place] [address]  Phone: [phone]  Fax: [phone]  Follow Up Time:

## 2020-05-18 NOTE — DISCHARGE NOTE NURSING/CASE MANAGEMENT/SOCIAL WORK - PATIENT PORTAL LINK FT
You can access the FollowMyHealth Patient Portal offered by [facility] by registering at the following website: [email or web address]. By joining [employer]’s FollowMyHealth portal, you will also be able to view your health information using other applications (apps) compatible with our system.

## 2020-05-18 NOTE — H&P CARDIOLOGY - NEGATIVE CARDIOVASCULAR SYMPTOMS
no paroxysmal nocturnal dyspnea/no chest pain/no dyspnea on exertion/no orthopnea/no palpitations/no peripheral edema

## 2020-05-18 NOTE — H&P CARDIOLOGY - PMH
Afib    CAD (coronary artery disease)  s/p stent of mLAD, rPDA, rPLS  Chronic congestive heart failure, unspecified congestive heart failure type  s/p AICD  Diabetes    DM (diabetes mellitus)    DM (diabetes mellitus)    HLD (hyperlipidemia)    HLD (hyperlipidemia)    HTN (hypertension)    HTN (hypertension)    Hypertension

## 2020-05-18 NOTE — H&P CARDIOLOGY - RS GEN PE MLT RESP DETAILS PC
no chest wall tenderness/no intercostal retractions/respirations non-labored/no wheezes/no rales/normal/airway patent/no rhonchi/breath sounds equal/good air movement/clear to auscultation bilaterally

## 2020-05-18 NOTE — CHART NOTE - NSCHARTNOTEFT_GEN_A_CORE
Patient s/p ICD generator change, Left chest appears clean, dry, intact, no evidence of active bleeding, no hematoma, + pressure dressing in place.  case d/w Dr Hood, OK to d/c pt home with current medications, Pt to take one dose of keflex now prior to dc and one dose in am. Pt to follow up with EP clinic as instructed.

## 2020-05-18 NOTE — H&P CARDIOLOGY - PSH
AICD (automatic cardioverter/defibrillator) present    AICD (automatic cardioverter/defibrillator) present    History of heart artery stent    Pacemaker

## 2020-05-18 NOTE — DISCHARGE NOTE PROVIDER - NSDCMRMEDTOKEN_GEN_ALL_CORE_FT
aspirin 81 mg oral delayed release tablet: 1 tab(s) orally once a day  furosemide 20 mg oral tablet: 1 tab(s) orally once a day  HumaLOG 100 units/mL injectable solution: 6 unit(s) injectable 3 times a day with meals  isosorbide dinitrate 20 mg oral tablet: 1 tab(s) orally 3 times a day  Keflex 500 mg oral capsule: 1 cap(s) orally every 12 hours  Levemir 100 units/mL subcutaneous solution: 18 unit(s) subcutaneous once a day (at bedtime)  losartan 50 mg oral tablet: 1 tab(s) orally once a day  metoprolol succinate 100 mg oral tablet, extended release: 1 tab(s) orally once a day  spironolactone 25 mg oral tablet: 1 tab(s) orally once a day

## 2020-05-18 NOTE — DISCHARGE NOTE PROVIDER - NSDCFUSCHEDAPPT_GEN_ALL_CORE_FT
JANKI NEWSOME ; 06/09/2020 ; [facility] Med Endocr [address] [facility]  JANKI NEWSOME ; 06/12/2020 ; [facility] [place] [facility] Cardio Electro [address] [facility]  JANKI NEWSOME ; 06/16/2020 ; [facility] [place] Med GenInt [address] [facility]  JANKI NEWSOME ; 07/22/2020 ; [facility] [place] Cardio Electro [address] [place], [place] [address]JANKI THOMPSON ; 07/24/2020 ; [facility] [place] [facility] Cardio [address] [place] [address] JANKI NEWSOME ; 06/09/2020 ; [facility] Med Endocr [address] [facility]  JANKI NEWSOME ; 06/12/2020 ; [facility] Cardio Electro [address] [facility]  JANKI NEWSOME ; 06/16/2020 ; [facility] Med GenInt [address] [facility]  JANKI NEWSOME ; 07/22/2020 ; [facility] Cardio Electro [address] [place], [place] [address]JANKI THOMPSON ; 07/24/2020 ; [facility] Cardio [address] [place] [address]

## 2020-05-18 NOTE — H&P CARDIOLOGY - HISTORY OF PRESENT ILLNESS
73 y/o F who walks with a cane with PMH of DM type II(on Insulin), HFrEF(EF pf 20% Dx in 2011; improved to 70% and s/p [facility] AICD). CAD(s/p several PCI last one in 2016), CVA, CKD, HTN who presented to [facility] as her device is at [facility]. Patient stated that she had a follow up with Dr. Hood in January and was told that her AICD generator needed to be changed as it is [employer]. Patient otherwise denied any CP, SOB, fevers, chills, N/V/D/C, abdominal pain, dysuria, melena, hematochezia, recent travel, sick contact, sore throat, near syncope, lightheadedness, dizziness pleuritic or positional chest pain.

## 2020-05-18 NOTE — H&P CARDIOLOGY - PSYCHIATRIC DETAILS
Patient : Milton Orosco Age: 34 year old Sex: male   MRN: [id number] Encounter Date: 3/8/2020  E19/19    History     HPI  3/8/2020  12:21 PM Milton Orosco is a 34 year old male with a h/o seizure disorder who presents to the ED for evaluation of right shoulder pain that began 1 week ago. The pt woke up with the pain and the pain has continued since. He feels the pain radiate down the back of the arm to the elbow and radiate to his neck. If the pt looks up, the pain will shoot down. Pt also reports right-sided CP but denies SOB. Ibuprofen was taken for the pain. 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 There are no further complaints or modifying factors at this time.    PCP: Pari Naranjo MD    Allergies   Allergen Reactions   • Penicillins        Current Discharge Medication List      Prior to Admission Medications    Details   cyclobenzaprine (FLEXERIL) 10 MG tablet Take 1 tablet by mouth 3 times daily as needed for Muscle spasms.  Qty: 15 tablet, Refills: 0      divalproex (DEPAKOTE ER) 500 MG 24 hr ER tablet TK 3 TS PO QD  Refills: 6      azithromycin (ZITHROMAX) 250 MG tablet Take 2 tablet on day 1 then 1 tablet once a day for 4 days.  Qty: 6 tablet, Refills: 0      triamcinolone (ARISTOCORT) 0.1 % ointment Apply topically 2 times daily.  Qty: 30 g, Refills: 0      ibuprofen (MOTRIN) 600 MG tablet Take 1 tablet by mouth every 6 hours as needed for Pain.  Qty: 30 tablet, Refills: 0      divalproex (DEPAKOTE) 500 MG delayed release EC tablet Take 500 mg by mouth 2 times daily.               Past Medical History:   Diagnosis Date   • ADHD (attention deficit hyperactivity disorder)    • Chronic kidney disease    • Depressive disorder    • Seizure (CMS/HCC)      History reviewed. No pertinent past surgical history.    Family History   Problem Relation Age of Onset   • Stroke Mother    • Diabetes Mother    • Stroke/TIA Mother    • Heart disease Father    • High cholesterol Father    •  Alcohol Abuse Father        Social History     Tobacco Use   • Smoking status: Current Every Day Smoker     Packs/day: 1.00     Years: 20.00     Pack years: 20.00   • Smokeless tobacco: Never Used   Substance Use Topics   • Alcohol use: Yes     Comment: rare   • Drug use: Yes     Types: Marijuana       Review of Systems   Constitutional: Negative for appetite change, chills and fever.   HENT: Negative for congestion and nosebleeds.    Eyes: Negative for visual disturbance.   Respiratory: Negative for cough and shortness of breath.    Cardiovascular: Positive for chest pain (right-side). Negative for palpitations and leg swelling.   Gastrointestinal: Negative for abdominal pain, blood in stool, constipation, diarrhea, nausea and vomiting.   Genitourinary: Negative for difficulty urinating, frequency and urgency.   Musculoskeletal: Positive for neck pain (secondary to shoulder pain). Negative for back pain.        Positive for right shoulder pain (radiates down the back of the arm to the elbow)   Skin: Negative for rash.   Neurological: Negative for dizziness, weakness and headaches.   Hematological: Does not bruise/bleed easily.   Psychiatric/Behavioral: Negative for confusion and sleep disturbance.       Physical Exam     ED Triage Vitals [03/08/20 1221]   ED Triage Vitals Group      Temp 98.7 °F (37.1 °C)      Heart Rate 85      Resp 18      BP (!) 148/90      SpO2 100 %      EtCO2 mmHg       Height 5' 9\" (1.753 m)      Weight 211 lb (95.7 kg)      Weight Scale Used ED Stated      BMI (Calculated) 31.16      IBW/kg (Calculated) 70.7       Physical Exam   Constitutional: No distress.   HENT:   Nose: Nose normal.   Mouth/Throat: Oropharynx is clear and moist.   Eyes: Pupils are equal, round, and reactive to light. Conjunctivae and EOM are normal. No scleral icterus.   Neck: Normal range of motion. Neck supple.   Cardiovascular: Normal rate, regular rhythm, normal heart sounds and intact distal pulses.   No murmur  heard.  Pulmonary/Chest: Effort normal and breath sounds normal. No respiratory distress. He has no wheezes. He has no rales.   Abdominal: Soft. Bowel sounds are normal. He exhibits no distension and no mass. There is no abdominal tenderness.   Musculoskeletal: Normal range of motion.         General: No edema.      Right shoulder: He exhibits tenderness and spasm.      Cervical back: He exhibits no tenderness.      Comments: Tenderness and spasms of right lateral neck and right trapezius  Full muscle strength  Full flexion and extension of right wrist  Full strength and sensory to right hand  No erythema or edema   Lymphadenopathy:     He has no cervical adenopathy.   Neurological: He is alert. No cranial nerve deficit. Coordination normal.   Skin: Skin is warm and dry. No rash noted.   Psychiatric: He has a normal mood and affect. His behavior is normal.   Nursing note and vitals reviewed.    ED Course     Procedures    Lab Results     No results found for this visit on 03/08/20.    EKG Results       Radiology Results     Imaging Results    None         ED Medication Orders (From admission, onward)    Ordered Start     Status Ordering Provider    03/08/20 1232 03/08/20 1232  HYDROcodone-acetaminophen (NORCO) 5-325 MG per tablet 2 tablet  ONCE      Last MAR action:  Given ALEYDA GORE  Vitals  Vitals:    03/08/20 1221 03/08/20 1237 03/08/20 1306   BP: (!) 148/90  133/88   Pulse: 85  75   Resp: 18 18 18   Temp: 98.7 °F (37.1 °C)     SpO2: 100%  98%   Weight: 95.7 kg     Height: 5' 9\" (1.753 m)         ED Course  12:27 PM I discussed that the symptoms indicate a pinched nerve. If the pain continues, the pt will need a MRI. Right now, the pt should focus on pain control. Pt will receive a referral for PT and a PCP. He should continue to take tylenol and ibuprofen. Pt will receive prescriptions for gabapentin and Vicodin. I stressed that Vicodin should only be taken if other medications do not  resolve the pain. The patient was advised to follow-up with their PCP, and to return to the ED in the event he develops any new or worsening symptoms. The patient understands and agrees with the plan of care. All additional questions and concerns have been addressed at this time.        [facility]  Critical Care time spent on this patient outside of billable procedures:  None  Clinical Impression  ED Diagnosis        Final diagnosis    Cervical radiculopathy     Associated Orders          SERVICE TO PHYSICAL THERAPY         The patient was provided with a recommendation to follow up with a primary care provider and obtain reassessment of his/her blood pressure within three months.    Follow Up:  Salvador Mcnamara MD  [address]  [facility] [id number] [phone]    In 2 days            Summary of your Discharge Medications      Take these Medications      Details   gabapentin 300 MG capsule  Commonly known as:  NEURONTIN   Take one tab 3 times a day for one week then increase to 2 tabs 3 times a day.     HYDROcodone-acetaminophen 5-325 MG per tablet  Commonly known as:  NORCO   Take 1-2 tablets by mouth every 4 hours as needed for Pain.     tiZANidine 4 MG tablet  Commonly known as:  ZANAFLEX   Take 1 tablet by mouth every 6 hours as needed (muscle spasm).            Pt is discharged to home/self care in stable condition.       I have reviewed the information recorded by the scribe for accuracy and agree with its contents.    ____________________________________________________________________    Aicha Capps acting as a scribe for Dr. Mily Lopez  Dictation # [id number]  Scribe: Aicha Lopez, DO  03/08/20 1440     normal affect/normal behavior

## 2020-05-18 NOTE — H&P CARDIOLOGY - FAMILY HISTORY
Sibling  Still living? Unknown  Family history of stomach cancer, Age at diagnosis: Age Unknown  Family history of AICD (automatic internal cardiac defibrillator), Age at diagnosis: Age Unknown

## 2020-05-18 NOTE — H&P CARDIOLOGY - NEGATIVE NEUROLOGICAL SYMPTOMS
no focal seizures/no loss of sensation/no loss of consciousness/no transient paralysis/no paresthesias/no syncope/no vertigo/no headache/no confusion/no hemiparesis/no difficulty walking/no weakness/no generalized seizures/no tremors

## 2020-05-18 NOTE — CHART NOTE - NSCHARTNOTEFT_GEN_A_CORE
Notified by RN that patient does not want to go home as she is feeling "dizziness and lightheadedness" post procedure. Examined patient at bedside and patient stated that after the procedure she feels like she is "going to pass out" and that the dizziness is new for her(was not present prior to the AICD generator change). Blood pressure noted to be 114/83. Spoke with Dr. Hood who agreed that it would be safe to keep patient overnight and monitor. Will be admitted to telemetry overnight. Fall precautions ordered. Daughter Sanna called and updated.

## 2020-05-18 NOTE — DISCHARGE NOTE PROVIDER - NSDCCPCAREPLAN_GEN_ALL_CORE_FT
PRINCIPAL DISCHARGE DIAGNOSIS  Diagnosis: Malfunction of biventricular implantable cardioverter-defibrillator (ICD) generator, initial encounter  Assessment and Plan of Treatment:       SECONDARY DISCHARGE DIAGNOSES  Diagnosis: Diabetes  Assessment and Plan of Treatment:     Diagnosis: HTN (hypertension)  Assessment and Plan of Treatment:     Diagnosis: Chronic CHF  Assessment and Plan of Treatment:

## 2020-05-18 NOTE — DISCHARGE NOTE PROVIDER - HOSPITAL COURSE
75 y/o F who walks with a cane with PMH of DM type II(on Insulin), HFrEF(EF pf 20% Dx in 2011; improved to 70% and s/p [employer] AICD). CAD(s/p several PCI last one in 2016), CVA, CKD, HTN who presented to [facility] as her device is at [facility]. Patient stated that she had a follow up with Dr. Hood in January and was told that her AICD generator needed to be changed as it is [employer]. Patient otherwise denied any CP, SOB, fevers, chills, N/V/D/C, abdominal pain, dysuria, melena, hematochezia, recent travel, sick contact, sore throat, near syncope, lightheadedness, dizziness pleuritic or positional chest pain.         5/18 EP Dr Hood -->  S/p BIV AICD generator change([employer])     pt to continue to take keflex one more dose in am

## 2020-05-21 DIAGNOSIS — Z71.89 OTHER SPECIFIED COUNSELING: ICD-10-CM

## 2020-06-09 ENCOUNTER — APPOINTMENT (OUTPATIENT)
Dept: ENDOCRINOLOGY | Facility: CLINIC | Age: 75
End: 2020-06-09
Payer: MEDICARE

## 2020-06-09 VITALS
OXYGEN SATURATION: 97 % | HEART RATE: 78 BPM | HEIGHT: 64 IN | WEIGHT: 158 LBS | BODY MASS INDEX: 26.98 KG/M2 | TEMPERATURE: 98.3 F | SYSTOLIC BLOOD PRESSURE: 122 MMHG | DIASTOLIC BLOOD PRESSURE: 70 MMHG

## 2020-06-09 DIAGNOSIS — R60.0 LOCALIZED EDEMA: ICD-10-CM

## 2020-06-09 LAB — HBA1C MFR BLD HPLC: 8.5

## 2020-06-09 PROCEDURE — 83036 HEMOGLOBIN GLYCOSYLATED A1C: CPT | Mod: QW

## 2020-06-09 PROCEDURE — 99215 OFFICE O/P EST HI 40 MIN: CPT | Mod: 25

## 2020-06-11 LAB
25(OH)D3 SERPL-MCNC: 32.7 NG/ML
ALBUMIN SERPL ELPH-MCNC: 4.1 G/DL
ALP BLD-CCNC: 69 U/L
ALT SERPL-CCNC: 72 U/L
ANION GAP SERPL CALC-SCNC: 14 MMOL/L
AST SERPL-CCNC: 62 U/L
BASOPHILS # BLD AUTO: 0.08 K/UL
BASOPHILS NFR BLD AUTO: 1.2 %
BILIRUB SERPL-MCNC: 0.7 MG/DL
BUN SERPL-MCNC: 26 MG/DL
CALCIUM SERPL-MCNC: 10.2 MG/DL
CALCIUM SERPL-MCNC: 10.2 MG/DL
CHLORIDE SERPL-SCNC: 101 MMOL/L
CO2 SERPL-SCNC: 22 MMOL/L
CREAT SERPL-MCNC: 1.17 MG/DL
EOSINOPHIL # BLD AUTO: 0.07 K/UL
EOSINOPHIL NFR BLD AUTO: 1.1 %
GLUCOSE SERPL-MCNC: 172 MG/DL
HCT VFR BLD CALC: 42.5 %
HGB BLD-MCNC: 12.9 G/DL
IMM GRANULOCYTES NFR BLD AUTO: 0.2 %
LYMPHOCYTES # BLD AUTO: 1.96 K/UL
LYMPHOCYTES NFR BLD AUTO: 29.7 %
MAN DIFF?: NORMAL
MCHC RBC-ENTMCNC: 30.4 GM/DL
MCHC RBC-ENTMCNC: 30.4 PG
MCV RBC AUTO: 100.2 FL
MONOCYTES # BLD AUTO: 0.8 K/UL
MONOCYTES NFR BLD AUTO: 12.1 %
NEUTROPHILS # BLD AUTO: 3.68 K/UL
NEUTROPHILS NFR BLD AUTO: 55.7 %
PARATHYROID HORMONE INTACT: 62 PG/ML
PLATELET # BLD AUTO: 170 K/UL
POTASSIUM SERPL-SCNC: 4.8 MMOL/L
PROT SERPL-MCNC: 7.3 G/DL
RBC # BLD: 4.24 M/UL
RBC # FLD: 14.2 %
SARS-COV-2 IGG SERPL IA-ACNC: 0.01 INDEX
SARS-COV-2 IGG SERPL QL IA: NEGATIVE
SODIUM SERPL-SCNC: 138 MMOL/L
TSH SERPL-ACNC: 2.06 UIU/ML
WBC # FLD AUTO: 6.6 K/UL

## 2020-06-12 ENCOUNTER — APPOINTMENT (OUTPATIENT)
Dept: ELECTROPHYSIOLOGY | Facility: CLINIC | Age: 75
End: 2020-06-12
Payer: MEDICARE

## 2020-06-12 PROCEDURE — 99024 POSTOP FOLLOW-UP VISIT: CPT

## 2020-06-16 ENCOUNTER — APPOINTMENT (OUTPATIENT)
Dept: INTERNAL MEDICINE | Facility: CLINIC | Age: 75
End: 2020-06-16
Payer: MEDICARE

## 2020-06-16 ENCOUNTER — NON-APPOINTMENT (OUTPATIENT)
Age: 75
End: 2020-06-16

## 2020-06-16 VITALS — SYSTOLIC BLOOD PRESSURE: 120 MMHG | DIASTOLIC BLOOD PRESSURE: 70 MMHG

## 2020-06-16 VITALS — TEMPERATURE: 97.8 F | BODY MASS INDEX: 27.14 KG/M2 | HEIGHT: 64 IN | WEIGHT: 159 LBS

## 2020-06-16 PROCEDURE — 99214 OFFICE O/P EST MOD 30 MIN: CPT | Mod: 25

## 2020-06-16 PROCEDURE — 93000 ELECTROCARDIOGRAM COMPLETE: CPT

## 2020-06-16 NOTE — PHYSICAL EXAM
[Normal] : soft, non-tender, non-distended, no masses palpated, no HSM and normal bowel sounds [Right Foot Was Examined] : Right foot ~C was examined [Left Foot Was Examined] : left foot ~C was examined [None] : no ulcers in either foot were found [] : both feet

## 2020-06-16 NOTE — HISTORY OF PRESENT ILLNESS
[de-identified] : This is a 74-year-old woman with a history of diabetes mellitus, chronic renal disease, hypertension, Mild CVA, diabetes,

## 2020-06-16 NOTE — ASSESSMENT
[FreeTextEntry1] : Problems\par Hypertension\par Diabetes\par CVA\par Cervical myelopathy\par Diabetic polyneuropathy\par Assessment\par I spoke to the patient regarding diet and the importance of diet on her diabetes hypertension and polyneuropathy. I explained to her importance of keeping A1c below 7. Her blood pressure today was well controlled at 130/70. The patient has an AICD in place which is being followed by her cardiologist. The patient today denies any chest pains or shortness of breath.\par 6/16/2020\par This is a patient with a host of medical issues. She has type 2 diabetes mellitus which is not well-controlled as the last A1c was 8.5 with mild chronic renal insufficiency. In addition she previously had a CVA. Her history is also complicated by ischemic cardiomyopathy with a low ejection fraction and cardiac arrhythmias for which she is has an AICD. This was recently checked by EPS and found to be functioning properly. Main complaint is significant dyspnea on extreme exertion. She denies any pedal edema. I spoke to the patient about being on a low-sodium diet and also low carbohydrate diet. In the importance of sense on her general health\par Of note on her last blood tests she had them all transaminitis. I referred her for an ultrasound of the liver

## 2020-06-18 ENCOUNTER — OUTPATIENT (OUTPATIENT)
Dept: OUTPATIENT SERVICES | Facility: HOSPITAL | Age: 75
LOS: 1 days | End: 2020-06-18
Payer: MEDICARE

## 2020-06-18 ENCOUNTER — APPOINTMENT (OUTPATIENT)
Dept: ULTRASOUND IMAGING | Facility: CLINIC | Age: 75
End: 2020-06-18
Payer: MEDICARE

## 2020-06-18 DIAGNOSIS — Z95.810 PRESENCE OF AUTOMATIC (IMPLANTABLE) CARDIAC DEFIBRILLATOR: Chronic | ICD-10-CM

## 2020-06-18 DIAGNOSIS — R74.0 NONSPECIFIC ELEVATION OF LEVELS OF TRANSAMINASE AND LACTIC ACID DEHYDROGENASE [LDH]: ICD-10-CM

## 2020-06-18 DIAGNOSIS — Z95.0 PRESENCE OF CARDIAC PACEMAKER: Chronic | ICD-10-CM

## 2020-06-18 DIAGNOSIS — Z95.5 PRESENCE OF CORONARY ANGIOPLASTY IMPLANT AND GRAFT: Chronic | ICD-10-CM

## 2020-06-18 PROCEDURE — 76700 US EXAM ABDOM COMPLETE: CPT | Mod: 26

## 2020-06-18 PROCEDURE — 76700 US EXAM ABDOM COMPLETE: CPT

## 2020-06-19 ENCOUNTER — OUTPATIENT (OUTPATIENT)
Dept: OUTPATIENT SERVICES | Facility: HOSPITAL | Age: 75
LOS: 1 days | End: 2020-06-19
Payer: MEDICARE

## 2020-06-19 ENCOUNTER — APPOINTMENT (OUTPATIENT)
Dept: ULTRASOUND IMAGING | Facility: CLINIC | Age: 75
End: 2020-06-19
Payer: MEDICARE

## 2020-06-19 DIAGNOSIS — Z95.810 PRESENCE OF AUTOMATIC (IMPLANTABLE) CARDIAC DEFIBRILLATOR: Chronic | ICD-10-CM

## 2020-06-19 DIAGNOSIS — I50.22 CHRONIC SYSTOLIC (CONGESTIVE) HEART FAILURE: ICD-10-CM

## 2020-06-19 DIAGNOSIS — E34.9 ENDOCRINE DISORDER, UNSPECIFIED: ICD-10-CM

## 2020-06-19 DIAGNOSIS — Z95.0 PRESENCE OF CARDIAC PACEMAKER: Chronic | ICD-10-CM

## 2020-06-19 DIAGNOSIS — Z95.5 PRESENCE OF CORONARY ANGIOPLASTY IMPLANT AND GRAFT: Chronic | ICD-10-CM

## 2020-06-19 PROCEDURE — 93970 EXTREMITY STUDY: CPT | Mod: 26

## 2020-06-19 PROCEDURE — 76775 US EXAM ABDO BACK WALL LIM: CPT | Mod: 26

## 2020-06-19 PROCEDURE — 76775 US EXAM ABDO BACK WALL LIM: CPT

## 2020-06-19 PROCEDURE — 93970 EXTREMITY STUDY: CPT

## 2020-06-25 ENCOUNTER — RX RENEWAL (OUTPATIENT)
Age: 75
End: 2020-06-25

## 2020-06-26 DIAGNOSIS — N28.1 CYST OF KIDNEY, ACQUIRED: ICD-10-CM

## 2020-07-08 ENCOUNTER — RX RENEWAL (OUTPATIENT)
Age: 75
End: 2020-07-08

## 2020-07-13 ENCOUNTER — RX RENEWAL (OUTPATIENT)
Age: 75
End: 2020-07-13

## 2020-07-17 ENCOUNTER — RX RENEWAL (OUTPATIENT)
Age: 75
End: 2020-07-17

## 2020-07-22 ENCOUNTER — APPOINTMENT (OUTPATIENT)
Dept: ELECTROPHYSIOLOGY | Facility: CLINIC | Age: 75
End: 2020-07-22

## 2020-07-24 ENCOUNTER — NON-APPOINTMENT (OUTPATIENT)
Age: 75
End: 2020-07-24

## 2020-07-24 ENCOUNTER — APPOINTMENT (OUTPATIENT)
Dept: CARDIOLOGY | Facility: CLINIC | Age: 75
End: 2020-07-24
Payer: MEDICARE

## 2020-07-24 VITALS
DIASTOLIC BLOOD PRESSURE: 70 MMHG | HEIGHT: 64 IN | SYSTOLIC BLOOD PRESSURE: 107 MMHG | HEART RATE: 64 BPM | WEIGHT: 160 LBS | TEMPERATURE: 97.1 F | OXYGEN SATURATION: 97 % | BODY MASS INDEX: 27.31 KG/M2 | RESPIRATION RATE: 16 BRPM

## 2020-07-24 PROCEDURE — 93000 ELECTROCARDIOGRAM COMPLETE: CPT

## 2020-07-24 PROCEDURE — 36415 COLL VENOUS BLD VENIPUNCTURE: CPT

## 2020-07-24 PROCEDURE — 99214 OFFICE O/P EST MOD 30 MIN: CPT

## 2020-07-24 RX ORDER — CLOTRIMAZOLE AND BETAMETHASONE DIPROPIONATE 10; .5 MG/G; MG/G
1-0.05 CREAM TOPICAL 3 TIMES DAILY
Qty: 1 | Refills: 2 | Status: DISCONTINUED | COMMUNITY
Start: 2019-12-06 | End: 2020-07-24

## 2020-07-24 RX ORDER — LANCETS
EACH MISCELLANEOUS
Qty: 2 | Refills: 5 | Status: DISCONTINUED | COMMUNITY
Start: 2018-02-22 | End: 2020-07-24

## 2020-07-24 RX ORDER — DIABETIC SUPPLIES,MISCELL
EACH MISCELLANEOUS
Qty: 1 | Refills: 5 | Status: DISCONTINUED | COMMUNITY
Start: 2018-01-26 | End: 2020-07-24

## 2020-07-24 RX ORDER — BLOOD-GLUCOSE METER
W/DEVICE EACH MISCELLANEOUS
Qty: 1 | Refills: 0 | Status: DISCONTINUED | COMMUNITY
Start: 2018-02-22 | End: 2020-07-24

## 2020-07-24 RX ORDER — LANCETS 30 GAUGE
EACH MISCELLANEOUS
Refills: 3 | Status: DISCONTINUED | COMMUNITY
Start: 2018-05-16 | End: 2020-07-24

## 2020-07-24 RX ORDER — MULTIVITAMIN/IRON/FOLIC ACID 18MG-0.4MG
600-400 TABLET ORAL
Qty: 1 | Refills: 5 | Status: DISCONTINUED | COMMUNITY
Start: 2018-12-14 | End: 2020-07-24

## 2020-07-24 RX ORDER — BLOOD SUGAR DIAGNOSTIC
STRIP MISCELLANEOUS 4 TIMES DAILY
Qty: 300 | Refills: 1 | Status: DISCONTINUED | COMMUNITY
Start: 2018-02-22 | End: 2020-07-24

## 2020-07-24 RX ORDER — BENZONATATE 100 MG/1
100 CAPSULE ORAL 3 TIMES DAILY
Qty: 30 | Refills: 1 | Status: DISCONTINUED | COMMUNITY
Start: 2020-03-10 | End: 2020-07-24

## 2020-07-24 NOTE — HISTORY OF PRESENT ILLNESS
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 \par \par Last seen 1/22/20 where she had been doing well. Weight is stable in the range of 155-157 lbs. Pt states she lives at home with her daughter. She is able to walk approx 1 block with her cane and hasn't been walking much due to weather. Able to climb 9-10 stairs w/o CP/SOB but gets tired. She sleeps with 2 pillows with no orthopnea. Denies chest pain, SOB/dizziness/diaphoresis and no syncope.  \par \par Takes all medications and follows low sodium/fluid restriction. Denies palpitations/ICD shocks. Had recent generator change June and reports pain from it controlled with Tylenol (once/night). \par \par Reports some pain in RUQ (10/10) that occurs intermittently, worse at night, lasting 2 minutes. Uses hot pack with improvement. Denies nausea/fevers/chills. Reports normal bowel movement.

## 2020-07-24 NOTE — ASSESSMENT
[FreeTextEntry1] : 74 y/o F w/ h/o IDDM (A1c 6.5), HFrecEF (fluctuating EF 25%, Dx 2011; improved to 70% 2016) s/p [employer] CRT-D, CAD s/p several PCI (last 2016; no h/o MI) who has been doing well since last visit. Currently appears euvolemic and compensated and normotensive.\par \par 1. HFrEF - resolved; s/p BiV ICD with ICD 70%\par - prev on lasix 20 mg three times/week but appears dehydrated; will stop for now and take prn\par - continue losartan 50 mg daily\par - continue with price 25 mg daily\par - continue toprol  mg daily\par - recheck labs today \par \par 2. CAD - stress test w/o ischemia 2018\par - last PCI 2016; on ASA 81 mg daily\par - on crestor 40 mg qhs and zetia 10 mg daily; lipid panel above goal ; recheck today\par - continue toprol to 100 mg daily \par \par RTC 6 months

## 2020-07-24 NOTE — PHYSICAL EXAM
[Well Groomed] : well groomed [Normal Appearance] : normal appearance [Normal Conjunctiva] : the conjunctiva exhibited no abnormalities [General Appearance - In No Acute Distress] : no acute distress [Normal Oral Mucosa] : normal oral mucosa [No Oral Pallor] : no oral pallor [Normal Oropharynx] : normal oropharynx [Heart Sounds] : normal S1 and S2 [Auscultation Breath Sounds / Voice Sounds] : lungs were clear to auscultation bilaterally [Respiration, Rhythm And Depth] : normal respiratory rhythm and effort [Heart Rate And Rhythm] : heart rate and rhythm were normal [Bowel Sounds] : normal bowel sounds [Arterial Pulses Normal] : the arterial pulses were normal [Abdomen Soft] : soft [Abdomen Tenderness] : non-tender [Cyanosis, Localized] : no localized cyanosis [Nail Clubbing] : no clubbing of the fingernails [Skin Turgor] : normal skin turgor [Skin Color & Pigmentation] : normal skin color and pigmentation [] : no rash [FreeTextEntry1] : mild cognitive impairment with memory recall

## 2020-07-29 LAB
ALBUMIN SERPL ELPH-MCNC: 4.3 G/DL
ALP BLD-CCNC: 60 U/L
ALT SERPL-CCNC: 43 U/L
ANION GAP SERPL CALC-SCNC: 16 MMOL/L
AST SERPL-CCNC: 37 U/L
BASOPHILS # BLD AUTO: 0.05 K/UL
BASOPHILS NFR BLD AUTO: 0.7 %
BILIRUB SERPL-MCNC: 0.5 MG/DL
BUN SERPL-MCNC: 33 MG/DL
CALCIUM SERPL-MCNC: 10.4 MG/DL
CHLORIDE SERPL-SCNC: 99 MMOL/L
CHOLEST SERPL-MCNC: 153 MG/DL
CHOLEST/HDLC SERPL: 3.4 RATIO
CO2 SERPL-SCNC: 24 MMOL/L
CREAT SERPL-MCNC: 1.27 MG/DL
EOSINOPHIL # BLD AUTO: 0.06 K/UL
EOSINOPHIL NFR BLD AUTO: 0.8 %
ESTIMATED AVERAGE GLUCOSE: 174 MG/DL
GLUCOSE SERPL-MCNC: 96 MG/DL
HBA1C MFR BLD HPLC: 7.7 %
HCT VFR BLD CALC: 42.8 %
HDLC SERPL-MCNC: 46 MG/DL
HGB BLD-MCNC: 13.4 G/DL
IMM GRANULOCYTES NFR BLD AUTO: 0.3 %
LDLC SERPL CALC-MCNC: 90 MG/DL
LYMPHOCYTES # BLD AUTO: 2.08 K/UL
LYMPHOCYTES NFR BLD AUTO: 28.3 %
MAN DIFF?: NORMAL
MCHC RBC-ENTMCNC: 30.8 PG
MCHC RBC-ENTMCNC: 31.3 GM/DL
MCV RBC AUTO: 98.4 FL
MONOCYTES # BLD AUTO: 0.7 K/UL
MONOCYTES NFR BLD AUTO: 9.5 %
NEUTROPHILS # BLD AUTO: 4.43 K/UL
NEUTROPHILS NFR BLD AUTO: 60.4 %
NT-PROBNP SERPL-MCNC: 124 PG/ML
PLATELET # BLD AUTO: 127 K/UL
POTASSIUM SERPL-SCNC: 4.6 MMOL/L
PROT SERPL-MCNC: 7.6 G/DL
RBC # BLD: 4.35 M/UL
RBC # FLD: 14.2 %
SODIUM SERPL-SCNC: 139 MMOL/L
TRIGL SERPL-MCNC: 87 MG/DL
TSH SERPL-ACNC: 2.04 UIU/ML
WBC # FLD AUTO: 7.34 K/UL

## 2020-08-05 ENCOUNTER — APPOINTMENT (OUTPATIENT)
Dept: CT IMAGING | Facility: CLINIC | Age: 75
End: 2020-08-05
Payer: MEDICARE

## 2020-08-05 ENCOUNTER — RESULT REVIEW (OUTPATIENT)
Age: 75
End: 2020-08-05

## 2020-08-05 ENCOUNTER — OUTPATIENT (OUTPATIENT)
Dept: OUTPATIENT SERVICES | Facility: HOSPITAL | Age: 75
LOS: 1 days | End: 2020-08-05
Payer: MEDICARE

## 2020-08-05 DIAGNOSIS — Z95.5 PRESENCE OF CORONARY ANGIOPLASTY IMPLANT AND GRAFT: Chronic | ICD-10-CM

## 2020-08-05 DIAGNOSIS — Z95.0 PRESENCE OF CARDIAC PACEMAKER: Chronic | ICD-10-CM

## 2020-08-05 DIAGNOSIS — Z95.810 PRESENCE OF AUTOMATIC (IMPLANTABLE) CARDIAC DEFIBRILLATOR: Chronic | ICD-10-CM

## 2020-08-05 DIAGNOSIS — Z00.8 ENCOUNTER FOR OTHER GENERAL EXAMINATION: ICD-10-CM

## 2020-08-05 PROCEDURE — 72125 CT NECK SPINE W/O DYE: CPT

## 2020-08-05 PROCEDURE — 72125 CT NECK SPINE W/O DYE: CPT | Mod: 26

## 2020-09-15 ENCOUNTER — LABORATORY RESULT (OUTPATIENT)
Age: 75
End: 2020-09-15

## 2020-09-15 ENCOUNTER — NON-APPOINTMENT (OUTPATIENT)
Age: 75
End: 2020-09-15

## 2020-09-15 ENCOUNTER — APPOINTMENT (OUTPATIENT)
Dept: INTERNAL MEDICINE | Facility: CLINIC | Age: 75
End: 2020-09-15
Payer: MEDICARE

## 2020-09-15 VITALS — BODY MASS INDEX: 28.34 KG/M2 | TEMPERATURE: 97.3 F | HEIGHT: 62 IN | WEIGHT: 154 LBS

## 2020-09-15 VITALS — SYSTOLIC BLOOD PRESSURE: 110 MMHG | DIASTOLIC BLOOD PRESSURE: 80 MMHG

## 2020-09-15 PROCEDURE — 99214 OFFICE O/P EST MOD 30 MIN: CPT | Mod: 25

## 2020-09-15 PROCEDURE — 93000 ELECTROCARDIOGRAM COMPLETE: CPT

## 2020-09-15 PROCEDURE — 36415 COLL VENOUS BLD VENIPUNCTURE: CPT

## 2020-09-15 NOTE — ASSESSMENT
[FreeTextEntry1] : Problems\par Ischemic cardiomyopathy\par Congestive failure\par AICD\par Cervical radiculopathy\par CVA\par Diabetes mellitus\par Right upper quadrant pain\par Assessment\par The patient was recently evaluated by cardiology and was found not to be in congestive heart failure. Her furosemide was discontinued. An evaluation did not show any evidence of ischemic heart disease. The patient is presently complaining of a right upper quadrant pains. With no radiation. She denies any change in her bowel habits or melena or rectal bleeding her physical examination is positive for a peripheral neuropathy. I requested a sonogram of the abdomen. And gave her Tylenol #2 p.r.n. for pain.

## 2020-09-15 NOTE — REVIEW OF SYSTEMS
[Fatigue] : fatigue [Vision Problems] : vision problems [Palpitations] : palpitations [Lower Ext Edema] : lower extremity edema [Wheezing] : no wheezing [Cough] : no cough [Dyspnea on Exertion] : dyspnea on exertion [Joint Pain] : joint pain [Abdominal Pain] : abdominal pain [Joint Stiffness] : joint stiffness [Muscle Pain] : muscle pain [Dizziness] : dizziness [Negative] : Heme/Lymph

## 2020-09-15 NOTE — PHYSICAL EXAM
[Normal] : soft, non-tender, non-distended, no masses palpated, no HSM and normal bowel sounds [Right Foot Was Examined] : Right foot ~C was examined [Left Foot Was Examined] : left foot ~C was examined [None] : no ulcers in either foot were found [] : with full ROM

## 2020-09-16 LAB
25(OH)D3 SERPL-MCNC: 38.3 NG/ML
ALBUMIN SERPL ELPH-MCNC: 4.4 G/DL
ALP BLD-CCNC: 60 U/L
ALT SERPL-CCNC: 34 U/L
ANION GAP SERPL CALC-SCNC: 9 MMOL/L
APPEARANCE: CLEAR
AST SERPL-CCNC: 35 U/L
BACTERIA: ABNORMAL
BASOPHILS # BLD AUTO: 0.04 K/UL
BASOPHILS NFR BLD AUTO: 0.6 %
BILIRUB SERPL-MCNC: 0.5 MG/DL
BILIRUBIN URINE: NEGATIVE
BLOOD URINE: NEGATIVE
BUN SERPL-MCNC: 25 MG/DL
CALCIUM SERPL-MCNC: 10.1 MG/DL
CHLORIDE SERPL-SCNC: 104 MMOL/L
CHOLEST SERPL-MCNC: 147 MG/DL
CHOLEST/HDLC SERPL: 3.2 RATIO
CO2 SERPL-SCNC: 26 MMOL/L
COLOR: NORMAL
CREAT SERPL-MCNC: 1.32 MG/DL
EOSINOPHIL # BLD AUTO: 0.03 K/UL
EOSINOPHIL NFR BLD AUTO: 0.4 %
ESTIMATED AVERAGE GLUCOSE: 154 MG/DL
FERRITIN SERPL-MCNC: 175 NG/ML
FOLATE SERPL-MCNC: 7.1 NG/ML
GLUCOSE QUALITATIVE U: NEGATIVE
GLUCOSE SERPL-MCNC: 134 MG/DL
HBA1C MFR BLD HPLC: 7 %
HCT VFR BLD CALC: 42.9 %
HDLC SERPL-MCNC: 47 MG/DL
HGB BLD-MCNC: 13.5 G/DL
HYALINE CASTS: 2 /LPF
IMM GRANULOCYTES NFR BLD AUTO: 0.4 %
KETONES URINE: NEGATIVE
LDLC SERPL CALC-MCNC: 83 MG/DL
LEUKOCYTE ESTERASE URINE: NEGATIVE
LYMPHOCYTES # BLD AUTO: 1.57 K/UL
LYMPHOCYTES NFR BLD AUTO: 23.3 %
MAN DIFF?: NORMAL
MCHC RBC-ENTMCNC: 30.8 PG
MCHC RBC-ENTMCNC: 31.5 GM/DL
MCV RBC AUTO: 97.7 FL
MICROSCOPIC-UA: NORMAL
MONOCYTES # BLD AUTO: 0.63 K/UL
MONOCYTES NFR BLD AUTO: 9.3 %
NEUTROPHILS # BLD AUTO: 4.44 K/UL
NEUTROPHILS NFR BLD AUTO: 66 %
NITRITE URINE: NEGATIVE
PH URINE: 6
PLATELET # BLD AUTO: 144 K/UL
POTASSIUM SERPL-SCNC: 4.6 MMOL/L
PROT SERPL-MCNC: 7.5 G/DL
PROTEIN URINE: NEGATIVE
RBC # BLD: 4.39 M/UL
RBC # FLD: 13.9 %
RED BLOOD CELLS URINE: 4 /HPF
SAVE SPECIMEN: NORMAL
SODIUM SERPL-SCNC: 140 MMOL/L
SPECIFIC GRAVITY URINE: 1.02
SQUAMOUS EPITHELIAL CELLS: 3 /HPF
T3RU NFR SERPL: 1.1 TBI
T4 SERPL-MCNC: 5 UG/DL
TRIGL SERPL-MCNC: 90 MG/DL
TSH SERPL-ACNC: 1.76 UIU/ML
URATE SERPL-MCNC: 5.5 MG/DL
UROBILINOGEN URINE: NORMAL
VIT B12 SERPL-MCNC: 769 PG/ML
WBC # FLD AUTO: 6.74 K/UL
WHITE BLOOD CELLS URINE: 1 /HPF

## 2020-10-12 ENCOUNTER — APPOINTMENT (OUTPATIENT)
Dept: ENDOCRINOLOGY | Facility: CLINIC | Age: 75
End: 2020-10-12
Payer: MEDICARE

## 2020-10-12 VITALS
TEMPERATURE: 97.6 F | SYSTOLIC BLOOD PRESSURE: 114 MMHG | HEART RATE: 73 BPM | OXYGEN SATURATION: 97 % | WEIGHT: 159 LBS | HEIGHT: 62 IN | DIASTOLIC BLOOD PRESSURE: 70 MMHG | BODY MASS INDEX: 29.26 KG/M2

## 2020-10-12 PROCEDURE — G0008: CPT

## 2020-10-12 PROCEDURE — 99214 OFFICE O/P EST MOD 30 MIN: CPT | Mod: 25

## 2020-10-12 PROCEDURE — 90662 IIV NO PRSV INCREASED AG IM: CPT

## 2020-10-15 NOTE — REVIEW OF SYSTEMS
[see HPI] : see HPI O-Z Flap Text: The defect edges were debeveled with a #15 scalpel blade.  Given the location of the defect, shape of the defect and the proximity to free margins an O-Z flap was deemed most appropriate.  Using a sterile surgical marker, an appropriate transposition flap was drawn incorporating the defect and placing the expected incisions within the relaxed skin tension lines where possible. The area thus outlined was incised deep to adipose tissue with a #15 scalpel blade.  The skin margins were undermined to an appropriate distance in all directions utilizing iris scissors.

## 2020-10-22 ENCOUNTER — APPOINTMENT (OUTPATIENT)
Dept: ELECTROPHYSIOLOGY | Facility: CLINIC | Age: 75
End: 2020-10-22

## 2020-11-13 ENCOUNTER — RX RENEWAL (OUTPATIENT)
Age: 75
End: 2020-11-13

## 2020-12-19 ENCOUNTER — RX RENEWAL (OUTPATIENT)
Age: 75
End: 2020-12-19

## 2021-01-06 ENCOUNTER — RX RENEWAL (OUTPATIENT)
Age: 76
End: 2021-01-06

## 2021-01-10 ENCOUNTER — RX RENEWAL (OUTPATIENT)
Age: 76
End: 2021-01-10

## 2021-01-12 ENCOUNTER — NON-APPOINTMENT (OUTPATIENT)
Age: 76
End: 2021-01-12

## 2021-01-12 ENCOUNTER — LABORATORY RESULT (OUTPATIENT)
Age: 76
End: 2021-01-12

## 2021-01-12 ENCOUNTER — APPOINTMENT (OUTPATIENT)
Dept: INTERNAL MEDICINE | Facility: CLINIC | Age: 76
End: 2021-01-12
Payer: MEDICARE

## 2021-01-12 VITALS — HEIGHT: 62 IN | BODY MASS INDEX: 29.26 KG/M2 | WEIGHT: 159 LBS | TEMPERATURE: 97.2 F

## 2021-01-12 VITALS — SYSTOLIC BLOOD PRESSURE: 120 MMHG | DIASTOLIC BLOOD PRESSURE: 70 MMHG

## 2021-01-12 DIAGNOSIS — N17.9 ACUTE KIDNEY FAILURE, UNSPECIFIED: ICD-10-CM

## 2021-01-12 DIAGNOSIS — Z86.73 PERSONAL HISTORY OF TRANSIENT ISCHEMIC ATTACK (TIA), AND CEREBRAL INFARCTION W/OUT RESIDUAL DEFICITS: ICD-10-CM

## 2021-01-12 DIAGNOSIS — G95.9 DISEASE OF SPINAL CORD, UNSPECIFIED: ICD-10-CM

## 2021-01-12 PROCEDURE — 36415 COLL VENOUS BLD VENIPUNCTURE: CPT

## 2021-01-12 PROCEDURE — 99213 OFFICE O/P EST LOW 20 MIN: CPT | Mod: 25

## 2021-01-12 PROCEDURE — 99072 ADDL SUPL MATRL&STAF TM PHE: CPT

## 2021-01-12 PROCEDURE — G0439: CPT

## 2021-01-12 PROCEDURE — 93000 ELECTROCARDIOGRAM COMPLETE: CPT | Mod: 59

## 2021-01-12 NOTE — PHYSICAL EXAM
[Declined Breast Exam] : declined breast exam  [Normal] : soft, non-tender, non-distended, no masses palpated, no HSM and normal bowel sounds [Declined Rectal Exam] : declined rectal exam [Right Foot Was Examined] : Right foot ~C was examined [Left Foot Was Examined] : left foot ~C was examined [None] : no ulcers in either foot were found [] : both feet

## 2021-01-12 NOTE — REVIEW OF SYSTEMS
[Fatigue] : fatigue [Vision Problems] : vision problems [Palpitations] : palpitations [Lower Ext Edema] : lower extremity edema [Wheezing] : no wheezing [Cough] : no cough [Dyspnea on Exertion] : dyspnea on exertion [Abdominal Pain] : abdominal pain [Joint Pain] : joint pain [Joint Stiffness] : joint stiffness [Muscle Pain] : muscle pain [Dizziness] : dizziness [Negative] : Heme/Lymph

## 2021-01-12 NOTE — ASSESSMENT
[FreeTextEntry1] : Problems\par \par Ischemic cardiomyopathy pacemaker defibrillator diabetes mellitus diabetic retinopathy hypertension cystic kidneys abdominal pains\par \par \par Assessment of the patient's physical examination was normal.  There was no evidence of abdominal tenderness guarding or rebound.  I did schedule the patient for noncontrast CAT scan of the abdomen.  In addition she is due for colonoscopy.  Also an order was placed for mammogram

## 2021-01-12 NOTE — HEALTH RISK ASSESSMENT
[] : No [No] : No [No falls in past year] : Patient reported no falls in the past year [0] : 2) Feeling down, depressed, or hopeless: Not at all (0) [[id number]] : 0 [Fully functional (bathing, dressing, toileting, transferring, walking, feeding)] : Fully functional (bathing, dressing, toileting, transferring, walking, feeding) [Fully functional (using the telephone, shopping, preparing meals, housekeeping, doing laundry, using] : Fully functional and needs no help or supervision to perform IADLs (using the telephone, shopping, preparing meals, housekeeping, doing laundry, using transportation, managing medications and managing finances) [Reviewed no changes] : Reviewed no changes [I will adhere to the patient's wishes as expressed in the advance directive except as noted below.] : I will adhere to the patient's wishes as expressed in the advance directive except as noted below [AdvancecareDate] : 1/12/21

## 2021-01-12 NOTE — HISTORY OF PRESENT ILLNESS
[de-identified] : This is a 75-year-old patient with a history of cardiomyopathy ischemic with a decreased ejection fraction for which she needs a pacemaker and defibrillator, in addition she has a history of diabetes mellitus complicated by diabetic neuropathy and mild renal insufficiency.  She also has a history of cystic kidneys and presently is complaining of right mid epigastric pain

## 2021-01-13 LAB
25(OH)D3 SERPL-MCNC: 38.7 NG/ML
ALBUMIN SERPL ELPH-MCNC: 4.6 G/DL
ALP BLD-CCNC: 74 U/L
ALT SERPL-CCNC: 69 U/L
ANION GAP SERPL CALC-SCNC: 13 MMOL/L
APPEARANCE: CLEAR
AST SERPL-CCNC: 52 U/L
BACTERIA: NEGATIVE
BASOPHILS # BLD AUTO: 0.05 K/UL
BASOPHILS NFR BLD AUTO: 0.9 %
BILIRUB SERPL-MCNC: 0.6 MG/DL
BILIRUBIN URINE: NEGATIVE
BLOOD URINE: NEGATIVE
BUN SERPL-MCNC: 25 MG/DL
CALCIUM SERPL-MCNC: 10.5 MG/DL
CHLORIDE SERPL-SCNC: 104 MMOL/L
CHOLEST SERPL-MCNC: 141 MG/DL
CO2 SERPL-SCNC: 24 MMOL/L
COLOR: NORMAL
CREAT SERPL-MCNC: 1.41 MG/DL
EOSINOPHIL # BLD AUTO: 0.04 K/UL
EOSINOPHIL NFR BLD AUTO: 0.7 %
ESTIMATED AVERAGE GLUCOSE: 151 MG/DL
FERRITIN SERPL-MCNC: 196 NG/ML
FOLATE SERPL-MCNC: 7.4 NG/ML
GLUCOSE QUALITATIVE U: NEGATIVE
GLUCOSE SERPL-MCNC: 82 MG/DL
HBA1C MFR BLD HPLC: 6.9 %
HCT VFR BLD CALC: 44.5 %
HDLC SERPL-MCNC: 45 MG/DL
HGB BLD-MCNC: 13.9 G/DL
HYALINE CASTS: 1 /LPF
IMM GRANULOCYTES NFR BLD AUTO: 0.2 %
KETONES URINE: NEGATIVE
LDLC SERPL CALC-MCNC: 78 MG/DL
LEUKOCYTE ESTERASE URINE: NEGATIVE
LYMPHOCYTES # BLD AUTO: 1.86 K/UL
LYMPHOCYTES NFR BLD AUTO: 31.8 %
MAN DIFF?: NORMAL
MCHC RBC-ENTMCNC: 30.9 PG
MCHC RBC-ENTMCNC: 31.2 GM/DL
MCV RBC AUTO: 98.9 FL
MICROSCOPIC-UA: NORMAL
MONOCYTES # BLD AUTO: 0.84 K/UL
MONOCYTES NFR BLD AUTO: 14.4 %
NEUTROPHILS # BLD AUTO: 3.04 K/UL
NEUTROPHILS NFR BLD AUTO: 52 %
NITRITE URINE: NEGATIVE
NONHDLC SERPL-MCNC: 96 MG/DL
PH URINE: 7
PLATELET # BLD AUTO: 128 K/UL
POTASSIUM SERPL-SCNC: 4.7 MMOL/L
PROT SERPL-MCNC: 7.7 G/DL
PROTEIN URINE: NEGATIVE
RBC # BLD: 4.5 M/UL
RBC # FLD: 13.9 %
RED BLOOD CELLS URINE: 1 /HPF
SODIUM SERPL-SCNC: 141 MMOL/L
SPECIFIC GRAVITY URINE: 1.02
SQUAMOUS EPITHELIAL CELLS: 1 /HPF
T3RU NFR SERPL: 1.1 TBI
T4 SERPL-MCNC: 5.5 UG/DL
TRIGL SERPL-MCNC: 90 MG/DL
TSH SERPL-ACNC: 2.04 UIU/ML
URATE SERPL-MCNC: 5.1 MG/DL
UROBILINOGEN URINE: NORMAL
VIT B12 SERPL-MCNC: 892 PG/ML
WBC # FLD AUTO: 5.84 K/UL
WHITE BLOOD CELLS URINE: 0 /HPF

## 2021-01-15 ENCOUNTER — APPOINTMENT (OUTPATIENT)
Dept: ELECTROPHYSIOLOGY | Facility: CLINIC | Age: 76
End: 2021-01-15

## 2021-01-20 ENCOUNTER — RX RENEWAL (OUTPATIENT)
Age: 76
End: 2021-01-20

## 2021-01-21 ENCOUNTER — APPOINTMENT (OUTPATIENT)
Dept: ELECTROPHYSIOLOGY | Facility: CLINIC | Age: 76
End: 2021-01-21
Payer: COMMERCIAL

## 2021-01-21 PROCEDURE — 93295 DEV INTERROG REMOTE 1/2/MLT: CPT

## 2021-01-21 PROCEDURE — 93296 REM INTERROG EVL PM/IDS: CPT

## 2021-01-26 ENCOUNTER — APPOINTMENT (OUTPATIENT)
Dept: ENDOCRINOLOGY | Facility: CLINIC | Age: 76
End: 2021-01-26
Payer: MEDICARE

## 2021-01-26 VITALS
TEMPERATURE: 97.8 F | HEART RATE: 74 BPM | WEIGHT: 154 LBS | OXYGEN SATURATION: 98 % | DIASTOLIC BLOOD PRESSURE: 70 MMHG | BODY MASS INDEX: 28.34 KG/M2 | HEIGHT: 62 IN | SYSTOLIC BLOOD PRESSURE: 120 MMHG

## 2021-01-26 PROCEDURE — 99072 ADDL SUPL MATRL&STAF TM PHE: CPT

## 2021-01-26 PROCEDURE — 99214 OFFICE O/P EST MOD 30 MIN: CPT

## 2021-01-27 ENCOUNTER — NON-APPOINTMENT (OUTPATIENT)
Age: 76
End: 2021-01-27

## 2021-01-27 ENCOUNTER — APPOINTMENT (OUTPATIENT)
Dept: CARDIOLOGY | Facility: CLINIC | Age: 76
End: 2021-01-27
Payer: MEDICARE

## 2021-01-27 VITALS
BODY MASS INDEX: 29.26 KG/M2 | DIASTOLIC BLOOD PRESSURE: 65 MMHG | HEART RATE: 72 BPM | SYSTOLIC BLOOD PRESSURE: 98 MMHG | HEIGHT: 62 IN | OXYGEN SATURATION: 97 % | TEMPERATURE: 97.8 F

## 2021-01-27 VITALS — BODY MASS INDEX: 29.26 KG/M2 | WEIGHT: 160 LBS

## 2021-01-27 PROCEDURE — 99072 ADDL SUPL MATRL&STAF TM PHE: CPT

## 2021-01-27 PROCEDURE — 99214 OFFICE O/P EST MOD 30 MIN: CPT

## 2021-01-27 PROCEDURE — 93000 ELECTROCARDIOGRAM COMPLETE: CPT

## 2021-01-27 RX ORDER — ZONISAMIDE 25 MG/1
25 CAPSULE ORAL DAILY
Refills: 0 | Status: DISCONTINUED | COMMUNITY
Start: 2021-01-11 | End: 2021-01-27

## 2021-01-27 NOTE — HISTORY OF PRESENT ILLNESS
[FreeTextEntry1] : 74 y/o F w/ h/o IDDM (A1c 6.7 5/19), HFrecEF (EF 25%, Dx 2011; improved to 70%) s/p 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 \par \par Last seen 7/24/20 where she had been doing well. Weight is stable in the range of 155-157 lbs. Pt states she lives at home with her daughter. She is able to walk approx 1 block with her cane and hasn't been walking much due to weather. Able to climb 9-10 stairs w/o CP/SOB but gets tired. She sleeps with 2 pillows with no orthopnea. Denies chest pain, SOB/dizziness/diaphoresis and no syncope.  \par \mauricio Takes all medications and follows low sodium/fluid restriction. Denies palpitations/ICD shocks. Reports BPs at times are in 90s-100. Denies lightheadedness/dizziness. \par \par Has not been hospitalized over past year. Denies fevers/chills. Awaiting Covid vaccine.

## 2021-01-27 NOTE — ASSESSMENT
[FreeTextEntry1] : 74 y/o F w/ h/o IDDM (A1c 6.5), HFrecEF (fluctuating EF 25%, Dx 2011; improved to 70% 2016) s/p [employer] CRT-D, CAD s/p several PCI (last 2016; no h/o MI) who has been doing well since last visit. Currently appears euvolemic/hypovolemic and compensated and normotensive.\par \par 1. HFrEF - resolved; s/p BiV ICD with ICD 70%\par - c/w lasix prn; encouraged hydration as she appears hypovolemic and recent labs show slightly uptrending Cr\par - continue losartan 50 mg daily\par - continue with price 25 mg daily\par - continue toprol  mg daily\par - on isordil 20 mg q8h; will stop given BP in  range; requested to inform us if BPs remain <100\par \par 2. CAD - stress test w/o ischemia 2018\par - last PCI 2016; on ASA 81 mg daily\par - on crestor 40 mg qhs and zetia 10 mg daily; lipid panel 1/13/21 improved and at goal \par - continue toprol 100 mg daily \par \par RTC 6 months

## 2021-04-22 ENCOUNTER — APPOINTMENT (OUTPATIENT)
Dept: ELECTROPHYSIOLOGY | Facility: CLINIC | Age: 76
End: 2021-04-22
Payer: MEDICARE

## 2021-04-22 ENCOUNTER — NON-APPOINTMENT (OUTPATIENT)
Age: 76
End: 2021-04-22

## 2021-04-22 PROCEDURE — 93295 DEV INTERROG REMOTE 1/2/MLT: CPT

## 2021-04-22 PROCEDURE — 93296 REM INTERROG EVL PM/IDS: CPT

## 2021-04-27 ENCOUNTER — RX RENEWAL (OUTPATIENT)
Age: 76
End: 2021-04-27

## 2021-04-29 ENCOUNTER — APPOINTMENT (OUTPATIENT)
Dept: ENDOCRINOLOGY | Facility: CLINIC | Age: 76
End: 2021-04-29

## 2021-05-06 ENCOUNTER — LABORATORY RESULT (OUTPATIENT)
Age: 76
End: 2021-05-06

## 2021-05-06 ENCOUNTER — APPOINTMENT (OUTPATIENT)
Dept: INTERNAL MEDICINE | Facility: CLINIC | Age: 76
End: 2021-05-06
Payer: MEDICARE

## 2021-05-06 ENCOUNTER — NON-APPOINTMENT (OUTPATIENT)
Age: 76
End: 2021-05-06

## 2021-05-06 VITALS — TEMPERATURE: 97.6 F | WEIGHT: 159 LBS | HEIGHT: 62 IN | BODY MASS INDEX: 29.26 KG/M2

## 2021-05-06 VITALS — SYSTOLIC BLOOD PRESSURE: 110 MMHG | DIASTOLIC BLOOD PRESSURE: 60 MMHG

## 2021-05-06 PROCEDURE — 99214 OFFICE O/P EST MOD 30 MIN: CPT | Mod: 25

## 2021-05-06 PROCEDURE — 93000 ELECTROCARDIOGRAM COMPLETE: CPT

## 2021-05-06 PROCEDURE — 99072 ADDL SUPL MATRL&STAF TM PHE: CPT

## 2021-05-06 PROCEDURE — 36415 COLL VENOUS BLD VENIPUNCTURE: CPT

## 2021-05-06 NOTE — HISTORY OF PRESENT ILLNESS
[de-identified] : This is a 75-year-old patient with a history of ischemic cardiomyopathy requiring the placement of a defibrillator pacemaker who also has episodes of congestive failure.  In addition the patient has history of diabetes mellitus complicated by diabetic polyneuropathy and mild renal insufficiency.  In addition of the patient fell 2 weeks ago and hit her head and right hip and is still complaining of pain in those areas

## 2021-05-06 NOTE — ASSESSMENT
[FreeTextEntry1] : Problems\par Congestive cardiomyopathy\par CHF\par Pacemaker defibrillator\par Diabetes mellitus\par Diabetic neuropathy\par Assessment\par 1-congestive cardiomyopathy and congestive failure-the patient presently is on a combination of beta-blockers specifically metoprolol 100 mg daily and also spironolactone 25 mg daily and also an angiotensin receptor blocker.  Presently the patient does have dyspnea on exertion but denies any shortness of breath at rest and also has no evidence of pedal edema.\par 2-pacemaker defibrillator-this is being monitored on a regular basis to make sure that there is no malfunction\par 3-diabetes mellitus-the patient continues on her insulin therapy and also on her cholesterol medications.  The patient was instructed to try to keep the A1c between 6-1/2-7.  Which basically she has been running the past number of visits.\par In view of her fall and hitting her head and right hip I referred the patient for noncontrast CT of the head and x-rays of the right hip

## 2021-05-06 NOTE — REVIEW OF SYSTEMS
[Fatigue] : fatigue [Vision Problems] : vision problems [Palpitations] : palpitations [Lower Ext Edema] : lower extremity edema [Dyspnea on Exertion] : dyspnea on exertion [Abdominal Pain] : abdominal pain [Joint Pain] : joint pain [Joint Stiffness] : joint stiffness [Muscle Pain] : muscle pain [Dizziness] : dizziness [Negative] : Heme/Lymph [Wheezing] : no wheezing [Cough] : no cough

## 2021-05-07 LAB
25(OH)D3 SERPL-MCNC: 42.2 NG/ML
ALBUMIN SERPL ELPH-MCNC: 4.4 G/DL
ALP BLD-CCNC: 83 U/L
ALT SERPL-CCNC: 90 U/L
ANION GAP SERPL CALC-SCNC: 11 MMOL/L
AST SERPL-CCNC: 70 U/L
BASOPHILS # BLD AUTO: 0.06 K/UL
BASOPHILS NFR BLD AUTO: 0.9 %
BILIRUB SERPL-MCNC: 0.5 MG/DL
BUN SERPL-MCNC: 26 MG/DL
CALCIUM SERPL-MCNC: 9.9 MG/DL
CHLORIDE SERPL-SCNC: 102 MMOL/L
CHOLEST SERPL-MCNC: 140 MG/DL
CO2 SERPL-SCNC: 25 MMOL/L
CREAT SERPL-MCNC: 1.34 MG/DL
EOSINOPHIL # BLD AUTO: 0.11 K/UL
EOSINOPHIL NFR BLD AUTO: 1.6 %
ESTIMATED AVERAGE GLUCOSE: 200 MG/DL
FERRITIN SERPL-MCNC: 226 NG/ML
FOLATE SERPL-MCNC: 8.9 NG/ML
GLUCOSE SERPL-MCNC: 156 MG/DL
HBA1C MFR BLD HPLC: 8.6 %
HCT VFR BLD CALC: 44.1 %
HDLC SERPL-MCNC: 41 MG/DL
HGB BLD-MCNC: 14.2 G/DL
IMM GRANULOCYTES NFR BLD AUTO: 0.3 %
LDLC SERPL CALC-MCNC: 75 MG/DL
LYMPHOCYTES # BLD AUTO: 2.35 K/UL
LYMPHOCYTES NFR BLD AUTO: 33.4 %
MAN DIFF?: NORMAL
MCHC RBC-ENTMCNC: 31.3 PG
MCHC RBC-ENTMCNC: 32.2 GM/DL
MCV RBC AUTO: 97.1 FL
MONOCYTES # BLD AUTO: 0.76 K/UL
MONOCYTES NFR BLD AUTO: 10.8 %
NEUTROPHILS # BLD AUTO: 3.73 K/UL
NEUTROPHILS NFR BLD AUTO: 53 %
NONHDLC SERPL-MCNC: 99 MG/DL
PLATELET # BLD AUTO: 130 K/UL
POTASSIUM SERPL-SCNC: 4.8 MMOL/L
PROT SERPL-MCNC: 7.6 G/DL
RBC # BLD: 4.54 M/UL
RBC # FLD: 14.2 %
SAVE SPECIMEN: NORMAL
SODIUM SERPL-SCNC: 138 MMOL/L
T3RU NFR SERPL: 1 TBI
T4 SERPL-MCNC: 5.6 UG/DL
TRIGL SERPL-MCNC: 119 MG/DL
TSH SERPL-ACNC: 2.35 UIU/ML
URATE SERPL-MCNC: 4.9 MG/DL
VIT B12 SERPL-MCNC: 948 PG/ML
WBC # FLD AUTO: 7.03 K/UL

## 2021-05-11 ENCOUNTER — APPOINTMENT (OUTPATIENT)
Dept: ENDOCRINOLOGY | Facility: CLINIC | Age: 76
End: 2021-05-11
Payer: MEDICARE

## 2021-05-11 PROCEDURE — 95250 CONT GLUC MNTR PHYS/QHP EQP: CPT

## 2021-05-11 PROCEDURE — G0108 DIAB MANAGE TRN  PER INDIV: CPT

## 2021-05-11 PROCEDURE — 95251 CONT GLUC MNTR ANALYSIS I&R: CPT

## 2021-05-14 ENCOUNTER — APPOINTMENT (OUTPATIENT)
Dept: OPHTHALMOLOGY | Facility: CLINIC | Age: 76
End: 2021-05-14
Payer: MEDICARE

## 2021-05-14 ENCOUNTER — NON-APPOINTMENT (OUTPATIENT)
Age: 76
End: 2021-05-14

## 2021-05-14 PROCEDURE — 92134 CPTRZ OPH DX IMG PST SGM RTA: CPT

## 2021-05-14 PROCEDURE — 99072 ADDL SUPL MATRL&STAF TM PHE: CPT

## 2021-05-14 PROCEDURE — 92014 COMPRE OPH EXAM EST PT 1/>: CPT

## 2021-05-18 ENCOUNTER — APPOINTMENT (OUTPATIENT)
Dept: CT IMAGING | Facility: CLINIC | Age: 76
End: 2021-05-18
Payer: MEDICARE

## 2021-05-18 ENCOUNTER — APPOINTMENT (OUTPATIENT)
Dept: RADIOLOGY | Facility: CLINIC | Age: 76
End: 2021-05-18
Payer: MEDICARE

## 2021-05-18 ENCOUNTER — OUTPATIENT (OUTPATIENT)
Dept: OUTPATIENT SERVICES | Facility: HOSPITAL | Age: 76
LOS: 1 days | End: 2021-05-18
Payer: MEDICARE

## 2021-05-18 DIAGNOSIS — Z95.810 PRESENCE OF AUTOMATIC (IMPLANTABLE) CARDIAC DEFIBRILLATOR: Chronic | ICD-10-CM

## 2021-05-18 DIAGNOSIS — Z95.0 PRESENCE OF CARDIAC PACEMAKER: Chronic | ICD-10-CM

## 2021-05-18 DIAGNOSIS — Z00.8 ENCOUNTER FOR OTHER GENERAL EXAMINATION: ICD-10-CM

## 2021-05-18 DIAGNOSIS — Z95.5 PRESENCE OF CORONARY ANGIOPLASTY IMPLANT AND GRAFT: Chronic | ICD-10-CM

## 2021-05-18 PROCEDURE — 73502 X-RAY EXAM HIP UNI 2-3 VIEWS: CPT | Mod: 26,RT

## 2021-05-18 PROCEDURE — 73502 X-RAY EXAM HIP UNI 2-3 VIEWS: CPT

## 2021-05-18 PROCEDURE — 70450 CT HEAD/BRAIN W/O DYE: CPT | Mod: 26

## 2021-05-18 PROCEDURE — 70450 CT HEAD/BRAIN W/O DYE: CPT

## 2021-06-04 ENCOUNTER — NON-APPOINTMENT (OUTPATIENT)
Age: 76
End: 2021-06-04

## 2021-06-15 ENCOUNTER — RX RENEWAL (OUTPATIENT)
Age: 76
End: 2021-06-15

## 2021-06-23 ENCOUNTER — RX RENEWAL (OUTPATIENT)
Age: 76
End: 2021-06-23

## 2021-07-01 ENCOUNTER — APPOINTMENT (OUTPATIENT)
Dept: OPHTHALMOLOGY | Facility: CLINIC | Age: 76
End: 2021-07-01
Payer: MEDICARE

## 2021-07-01 ENCOUNTER — NON-APPOINTMENT (OUTPATIENT)
Age: 76
End: 2021-07-01

## 2021-07-01 PROCEDURE — 92083 EXTENDED VISUAL FIELD XM: CPT

## 2021-07-01 PROCEDURE — 92133 CPTRZD OPH DX IMG PST SGM ON: CPT

## 2021-07-01 PROCEDURE — 99072 ADDL SUPL MATRL&STAF TM PHE: CPT

## 2021-07-01 PROCEDURE — 92012 INTRM OPH EXAM EST PATIENT: CPT

## 2021-07-07 ENCOUNTER — RX RENEWAL (OUTPATIENT)
Age: 76
End: 2021-07-07

## 2021-07-22 ENCOUNTER — APPOINTMENT (OUTPATIENT)
Dept: ELECTROPHYSIOLOGY | Facility: CLINIC | Age: 76
End: 2021-07-22

## 2021-07-28 ENCOUNTER — NON-APPOINTMENT (OUTPATIENT)
Age: 76
End: 2021-07-28

## 2021-07-28 ENCOUNTER — APPOINTMENT (OUTPATIENT)
Dept: CARDIOLOGY | Facility: CLINIC | Age: 76
End: 2021-07-28
Payer: MEDICARE

## 2021-07-28 VITALS
WEIGHT: 160 LBS | HEART RATE: 65 BPM | TEMPERATURE: 96.3 F | HEIGHT: 62 IN | RESPIRATION RATE: 16 BRPM | OXYGEN SATURATION: 97 % | BODY MASS INDEX: 29.44 KG/M2 | DIASTOLIC BLOOD PRESSURE: 67 MMHG | SYSTOLIC BLOOD PRESSURE: 103 MMHG

## 2021-07-28 PROCEDURE — 93000 ELECTROCARDIOGRAM COMPLETE: CPT

## 2021-07-28 PROCEDURE — 99072 ADDL SUPL MATRL&STAF TM PHE: CPT

## 2021-07-28 PROCEDURE — 99214 OFFICE O/P EST MOD 30 MIN: CPT

## 2021-07-28 NOTE — ASSESSMENT
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 CRT-D, CAD s/p several PCI (last 2016; no h/o MI) who has been doing well since last visit. Currently appears euvolemic/hypovolemic and compensated and low normotensive.\par \par 1. HFrEF - resolved; s/p BiV ICD with ICD 70%\par - c/w lasix prn; pt has not taken recently due to dizziness and low B/p at times\par - continue losartan 50 mg daily\par - continue with price 25 mg daily\par - continue toprol  mg daily\par -  Previously, sordil 20 mg q8h stopped given BP in  range; requested to inform us if BPs remain <100\par - will repeat TTE with next appt with carotid dopplers due to dizziness and Dopplers of lower extremities to R/O DVT\par \par 2. CAD - stress test w/o ischemia 2018\par - last PCI 2016; on ASA 81 mg daily\par - on crestor 40 mg qhs and zetia 10 mg daily; lipid panel 1/13/21 improved and at goal \par - continue toprol 100 mg daily \par \par RTC 6 months

## 2021-07-28 NOTE — HISTORY OF PRESENT ILLNESS
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 \par \par Last seen 1/27/21 where she had been doing well. Weight is stable in the range of 155 lbs. Pt states she lives at home with her daughter. She is able to walk approx 1 block with her cane . Able to climb 9-10 stairs w/o CP/SOB but gets tired. States she gets episodes of dizziness since she fell and hit her head last November. States she feels a firmness on RLE around thigh. She sleeps with 2 pillows with no orthopnea. Denies chest pain, SOB/diaphoresis and no syncope.  \par \par Takes all medications and follows low sodium/fluid restriction. Denies palpitations/ICD shocks. Reports BPs at times are in 90s-100.. \par \par Has not been hospitalized over past year. Denies fevers/chills. \par \par Pt had Covid vaccine x 2 without adverse reaction.

## 2021-08-23 ENCOUNTER — LABORATORY RESULT (OUTPATIENT)
Age: 76
End: 2021-08-23

## 2021-08-23 ENCOUNTER — APPOINTMENT (OUTPATIENT)
Dept: INTERNAL MEDICINE | Facility: CLINIC | Age: 76
End: 2021-08-23
Payer: MEDICARE

## 2021-08-23 VITALS — DIASTOLIC BLOOD PRESSURE: 67 MMHG | SYSTOLIC BLOOD PRESSURE: 110 MMHG

## 2021-08-23 VITALS
HEIGHT: 62 IN | HEART RATE: 54 BPM | BODY MASS INDEX: 28.89 KG/M2 | WEIGHT: 157 LBS | OXYGEN SATURATION: 96 % | TEMPERATURE: 96.7 F

## 2021-08-23 PROCEDURE — 36415 COLL VENOUS BLD VENIPUNCTURE: CPT

## 2021-08-23 PROCEDURE — 99214 OFFICE O/P EST MOD 30 MIN: CPT | Mod: 25

## 2021-08-23 RX ORDER — CHOLECALCIFEROL (VITAMIN D3) 25 MCG
25 MCG CAPSULE ORAL
Qty: 90 | Refills: 1 | Status: ACTIVE | COMMUNITY
Start: 2020-12-19

## 2021-08-23 NOTE — ASSESSMENT
[FreeTextEntry1] : Problems\par Congestive cardiomyopathy\par CHF\par Pacemaker defibrillator\par Diabetes mellitus\par Diabetic neuropathy\par Assessment\par 1-congestive cardiomyopathy and congestive failure-the patient presently is on a combination of beta-blockers specifically metoprolol 100 mg daily and also spironolactone 25 mg daily and also an angiotensin receptor blocker.  Presently the patient does have dyspnea on exertion but denies any shortness of breath at rest and also has no evidence of pedal edema.\par 2-pacemaker defibrillator-this is being monitored on a regular basis to make sure that there is no malfunction\par 3-diabetes mellitus-the patient continues on her insulin therapy and also on her cholesterol medications.  The patient was instructed to try to keep the A1c between 6-1/2-7.  Which basically she has been running the past number of visits.\par In view of her fall and hitting her head and right hip I referred the patient for noncontrast CT of the head and x-rays of the right hip\par 8/23/21\par The patient today is also complaining of episodes of right upper quadrant pain especially after she eats.  She denies any fever any chills or hematuria.  The pain is episodic and described as being colicky.  Her blood pressure and physical examination is normal my concern is that she may be having episodes of biliary colic.  Or renal colic.  An ultrasound of the abdomen was requested.  I also advised the patient to get her third Covid vaccine but

## 2021-08-23 NOTE — HISTORY OF PRESENT ILLNESS
[de-identified] : This is a 75-year-old patient with a history of ischemic cardiomyopathy requiring the placement of a defibrillator pacemaker who also has episodes of congestive failure.  In addition the patient has history of diabetes mellitus complicated by diabetic polyneuropathy and mild renal insufficiency.  In addition of the patient fell 2 weeks ago and hit her head and right hip and is still complaining of pain in those areas

## 2021-08-24 LAB
25(OH)D3 SERPL-MCNC: 43.8 NG/ML
ALBUMIN SERPL ELPH-MCNC: 4.2 G/DL
ALP BLD-CCNC: 78 U/L
ALT SERPL-CCNC: 74 U/L
ANION GAP SERPL CALC-SCNC: 9 MMOL/L
AST SERPL-CCNC: 63 U/L
BASOPHILS # BLD AUTO: 0.05 K/UL
BASOPHILS NFR BLD AUTO: 0.7 %
BILIRUB SERPL-MCNC: 0.6 MG/DL
BUN SERPL-MCNC: 24 MG/DL
CALCIUM SERPL-MCNC: 10.1 MG/DL
CHLORIDE SERPL-SCNC: 104 MMOL/L
CHOLEST SERPL-MCNC: 145 MG/DL
CO2 SERPL-SCNC: 28 MMOL/L
CREAT SERPL-MCNC: 1.24 MG/DL
EOSINOPHIL # BLD AUTO: 0.07 K/UL
EOSINOPHIL NFR BLD AUTO: 1 %
ESTIMATED AVERAGE GLUCOSE: 186 MG/DL
FOLATE SERPL-MCNC: 13 NG/ML
GLUCOSE SERPL-MCNC: 123 MG/DL
HBA1C MFR BLD HPLC: 8.1 %
HCT VFR BLD CALC: 42.5 %
HDLC SERPL-MCNC: 43 MG/DL
HGB BLD-MCNC: 14.1 G/DL
IMM GRANULOCYTES NFR BLD AUTO: 0.3 %
LDLC SERPL CALC-MCNC: 79 MG/DL
LYMPHOCYTES # BLD AUTO: 2.17 K/UL
LYMPHOCYTES NFR BLD AUTO: 30.4 %
MAN DIFF?: NORMAL
MCHC RBC-ENTMCNC: 31.8 PG
MCHC RBC-ENTMCNC: 33.2 GM/DL
MCV RBC AUTO: 95.7 FL
MONOCYTES # BLD AUTO: 0.9 K/UL
MONOCYTES NFR BLD AUTO: 12.6 %
NEUTROPHILS # BLD AUTO: 3.92 K/UL
NEUTROPHILS NFR BLD AUTO: 55 %
NONHDLC SERPL-MCNC: 102 MG/DL
PLATELET # BLD AUTO: 117 K/UL
POTASSIUM SERPL-SCNC: 4.6 MMOL/L
PROT SERPL-MCNC: 7.5 G/DL
RBC # BLD: 4.44 M/UL
RBC # FLD: 13.6 %
SODIUM SERPL-SCNC: 141 MMOL/L
T3RU NFR SERPL: 1.1 TBI
T4 SERPL-MCNC: 5.4 UG/DL
TRIGL SERPL-MCNC: 114 MG/DL
TSH SERPL-ACNC: 1.8 UIU/ML
URATE SERPL-MCNC: 5.1 MG/DL
VIT B12 SERPL-MCNC: 855 PG/ML
WBC # FLD AUTO: 7.13 K/UL

## 2021-08-26 LAB
BASOPHILS # BLD AUTO: 0.05 K/UL
BASOPHILS NFR BLD AUTO: 0.8 %
EOSINOPHIL # BLD AUTO: 0.06 K/UL
EOSINOPHIL NFR BLD AUTO: 0.9 %
HCT VFR BLD CALC: 42.2 %
HGB BLD-MCNC: 13.7 G/DL
IMM GRANULOCYTES NFR BLD AUTO: 0.5 %
LYMPHOCYTES # BLD AUTO: 2.22 K/UL
LYMPHOCYTES NFR BLD AUTO: 34.1 %
MAN DIFF?: NORMAL
MCHC RBC-ENTMCNC: 31.9 PG
MCHC RBC-ENTMCNC: 32.5 GM/DL
MCV RBC AUTO: 98.4 FL
MONOCYTES # BLD AUTO: 0.72 K/UL
MONOCYTES NFR BLD AUTO: 11.1 %
NEUTROPHILS # BLD AUTO: 3.43 K/UL
NEUTROPHILS NFR BLD AUTO: 52.6 %
PLATELET # BLD AUTO: 129 K/UL
RBC # BLD: 4.29 M/UL
RBC # FLD: 13.8 %
WBC # FLD AUTO: 6.51 K/UL

## 2021-09-21 ENCOUNTER — RX RENEWAL (OUTPATIENT)
Age: 76
End: 2021-09-21

## 2021-10-08 ENCOUNTER — APPOINTMENT (OUTPATIENT)
Dept: ENDOCRINOLOGY | Facility: CLINIC | Age: 76
End: 2021-10-08
Payer: MEDICARE

## 2021-10-08 VITALS
OXYGEN SATURATION: 98 % | DIASTOLIC BLOOD PRESSURE: 68 MMHG | BODY MASS INDEX: 29.44 KG/M2 | RESPIRATION RATE: 16 BRPM | HEIGHT: 62 IN | HEART RATE: 65 BPM | TEMPERATURE: 97.3 F | SYSTOLIC BLOOD PRESSURE: 126 MMHG | WEIGHT: 160 LBS

## 2021-10-08 PROCEDURE — 99214 OFFICE O/P EST MOD 30 MIN: CPT

## 2021-10-14 ENCOUNTER — APPOINTMENT (OUTPATIENT)
Dept: OPHTHALMOLOGY | Facility: CLINIC | Age: 76
End: 2021-10-14

## 2021-10-19 ENCOUNTER — RX RENEWAL (OUTPATIENT)
Age: 76
End: 2021-10-19

## 2021-11-23 ENCOUNTER — APPOINTMENT (OUTPATIENT)
Dept: INTERNAL MEDICINE | Facility: CLINIC | Age: 76
End: 2021-11-23

## 2021-12-12 ENCOUNTER — RX RENEWAL (OUTPATIENT)
Age: 76
End: 2021-12-12

## 2021-12-29 ENCOUNTER — EMERGENCY (EMERGENCY)
Facility: HOSPITAL | Age: 76
LOS: 1 days | Discharge: HOME | End: 2021-12-29
Admitting: EMERGENCY MEDICINE
Payer: MEDICARE

## 2021-12-29 VITALS
DIASTOLIC BLOOD PRESSURE: 61 MMHG | OXYGEN SATURATION: 99 % | WEIGHT: 154.98 LBS | HEART RATE: 79 BPM | SYSTOLIC BLOOD PRESSURE: 112 MMHG | HEIGHT: 64 IN | TEMPERATURE: 99 F

## 2021-12-29 DIAGNOSIS — Z95.0 PRESENCE OF CARDIAC PACEMAKER: Chronic | ICD-10-CM

## 2021-12-29 DIAGNOSIS — Z95.810 PRESENCE OF AUTOMATIC (IMPLANTABLE) CARDIAC DEFIBRILLATOR: Chronic | ICD-10-CM

## 2021-12-29 DIAGNOSIS — Z95.5 PRESENCE OF CORONARY ANGIOPLASTY IMPLANT AND GRAFT: Chronic | ICD-10-CM

## 2021-12-29 LAB — SARS-COV-2 RNA SPEC QL NAA+PROBE: SIGNIFICANT CHANGE UP

## 2021-12-29 PROCEDURE — 99284 EMERGENCY DEPT VISIT MOD MDM: CPT

## 2021-12-29 NOTE — ED ADULT TRIAGE NOTE - HISTORY OF COVID-19 VACCINATION
Surgeon: Sussy Weems MD  Assistant: Efrain Prieto  Date of Procedure: 2019  Pre-operative diagnosis: IUP at 39 weeks in breech presentation  Post-operative diagnosis: same with delivery  Procedure: primary LTCS  Anesthesia:spinal  Indications for procedure: infant in breech presentation  Description of Findings:: live female infant in cephalic presetnation; weight 6 lbs 14 oz. ; APGARS 9/9; normal uterus tubes and ovaries bilaterally    Description of Procedure:  The patient was taken to the OB operating room where spinal anesthesia was achieved by the anesthesia provider and found to be adequate. The patient was prepped and draped in the normal sterile fashion in the supine position with a leftward tilt. A Pfannensteil skin incision was created and carried down to the fascia. The fascia was incised and the fascial incision was extended bilaterally with Patino scissors. Kocher clamps were placed and the rectus muscles were dissected off the fascia superiorly and inferiorly. The peritoneum was entered sharply. The peritoneal incision was extended and the bladder flap was created in the normal fashion. The bladder blade was inserted and the low transverse incision was made in the uterus and extended bilaterally. The infant was delivered. Cord was clamped and cut and the infant was handed to the waiting  midlevel practitioner who was called to delivery for  section. Cord blood was obtained and the placenta was expressed. The uterus was exteriorized and cleaned of all clots and debris. The uterine incision was identified and closed with 0 Vicryl in a running locked fashion. A second imbricating layer of the same suture was placed. Hemostasis was achieved. The ovaries and fallopian tubes were inspected and normal.  The uterus was re-placed into the abdomen. Pericolic gutters were irrigated and found to return clear. The uterine incision and bladder was re-inspected and found to be hemostatic.   The fascia was closed with 0 Vicryl in a running non-locked fashion and subcutaneous tissue was irrigated and found to return clear.  The skin was closed with 4 monocryl in the normal fashion and sponge, lap and instrument counts were correct x 2. A sterile dressing and Steri-Strips were applied and the patient was taken to recovery in stable condition. She received Ancef antibiotic perioperatively.    Estimated Blood Loss: 50cc prior to lap sponges being weighed    Complications: none    Specimens: none       Yes

## 2021-12-29 NOTE — ED PROVIDER NOTE - PATIENT PORTAL LINK FT
You can access the FollowMyHealth Patient Portal offered by [facility] by registering at the following website: [email or web address]. By joining [facility] [employer]’s FollowMyHealth portal, you will also be able to view your health information using other applications (apps) compatible with our system.

## 2021-12-29 NOTE — ED PROVIDER NOTE - NSFOLLOWUPINSTRUCTIONS_ED_ALL_ED_FT
If You Test Positive for COVID-19 (Isolate)  Everyone, regardless of vaccination status.    Stay home for 5 days.  If you have no symptoms or your symptoms are resolving after 5 days, you can leave your house.  Continue to wear a mask around others for 5 additional days.  If you have a fever, continue to stay home until your fever resolves.    If You Were Exposed to Someone with COVID-19 (Quarantine)  If you:    Have been boosted  OR  Completed the primary series of Pfizer or Moderna vaccine within the last 6 months  OR  Completed the primary series of J&J vaccine within the last 2 months    Wear a mask around others for 10 days.  Test on day 5, if possible.  If you develop symptoms get a test and stay home.    If you:    Completed the primary series of Pfizer or Moderna vaccine over 6 months ago and are not boosted  OR  Completed the primary series of J&J over 2 months ago and are not boosted  OR  Are unvaccinated    Stay home for 5 days. After that continue to wear a mask around others for 5 additional days.  If you can’t quarantine you must wear a mask for 10 days.  Test on day 5 if possible.  If you develop symptoms get a test and stay home

## 2021-12-29 NOTE — ED PROVIDER NOTE - NSICDXPASTMEDICALHX_GEN_ALL_CORE_FT
PAST MEDICAL HISTORY:  Afib     CAD (coronary artery disease) s/p stent of mLAD, rPDA, rPLS    Chronic congestive heart failure, unspecified congestive heart failure type s/p AICD    Diabetes     DM (diabetes mellitus)     DM (diabetes mellitus)     HLD (hyperlipidemia)     HLD (hyperlipidemia)     HTN (hypertension)     HTN (hypertension)     Hypertension

## 2021-12-29 NOTE — ED PROVIDER NOTE - NSICDXPASTSURGICALHX_GEN_ALL_CORE_FT
PAST SURGICAL HISTORY:  AICD (automatic cardioverter/defibrillator) present     AICD (automatic cardioverter/defibrillator) present     History of heart artery stent     Pacemaker

## 2021-12-29 NOTE — ED PROVIDER NOTE - NSICDXFAMILYHX_GEN_ALL_CORE_FT
FAMILY HISTORY:  Sibling  Still living? Unknown  Family history of AICD (automatic internal cardiac defibrillator), Age at diagnosis: Age Unknown  Family history of stomach cancer, Age at diagnosis: Age Unknown

## 2021-12-29 NOTE — ED PROVIDER NOTE - PROGRESS NOTE DETAILS
Discussed with daughter reports her and mother both came to ED for COVID testing 2/2 to exposure advised text will be received of results. discussed return precautions reports she will be taking care of mother at home. Gus Aparicio PA-C

## 2021-12-29 NOTE — ED PROVIDER NOTE - NS ED ROS FT
Review of Systems:  · Constitutional: chills, fever, no night sweats, no weight loss  · Nose: no epistaxis  · Mouth/Throat: no difficulty in swallowing, trachea midline, uvula midline  . Cardio: No CP, no palpitations, no chest pressure, no ripping chest pain  · Respiratory: cough, no exertional dyspnea, no hemoptysis, no orthopnea, no shortness of breath  · Gastrointestinal: no abdominal pain, no diarrhea, no melena, no nausea, no vomiting  · Genitourinary: no difficulty urinating, no dysuria, no hematuria  · MUSCULOSKELETAL: body aches, FROM of all extremities  · Skin: no abrasion; no bruising; no laceration  · Neurological: no change in level of consciousness, no headache, no seizures  · ROS STATEMENT: all other ROS negative except as per HPI

## 2021-12-29 NOTE — ED ADULT TRIAGE NOTE - CHIEF COMPLAINT QUOTE
onset 3 days ago. c/o cough , fever, runny nose   and headache  know covid exposure   PMH includes DM2, HTN

## 2022-01-14 ENCOUNTER — APPOINTMENT (OUTPATIENT)
Dept: CARDIOLOGY | Facility: CLINIC | Age: 77
End: 2022-01-14
Payer: MEDICARE

## 2022-01-14 VITALS — OXYGEN SATURATION: 96 %

## 2022-01-14 VITALS
DIASTOLIC BLOOD PRESSURE: 83 MMHG | HEART RATE: 63 BPM | WEIGHT: 158 LBS | BODY MASS INDEX: 28.9 KG/M2 | SYSTOLIC BLOOD PRESSURE: 145 MMHG | OXYGEN SATURATION: 94 %

## 2022-01-14 PROCEDURE — 99214 OFFICE O/P EST MOD 30 MIN: CPT

## 2022-01-14 PROCEDURE — 93000 ELECTROCARDIOGRAM COMPLETE: CPT

## 2022-01-14 NOTE — ASSESSMENT
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 CRT-D, CAD s/p several PCI (last 2016; no h/o MI) who had a fall since last visit and appears frail and unsteady on her feet. Currently appears euvolemic and compensated and with B/P 145/83.\par \par 1. HFrEF - resolved; s/p BiV ICD with ICD 70%\par - c/w lasix prn; pt has not taken recently due to dizziness and low B/p at times\par - continue losartan 50 mg daily\par - continue with price 25 mg daily\par - continue toprol  mg daily\par -  Previously, sordil 20 mg q8h stopped given BP in  range; requested to inform us if BPs remain <100\par - will repeat TTE with next appt in 6 months\par - scheduled in office device check in 6 months\par - discussed plan and meds with daughter Sanna and appts scheduled for patient for echo \par \par 2. CAD - stress test w/o ischemia 2018\par - last PCI 2016; on ASA 81 mg daily\par - on crestor 40 mg qhs and zetia 10 mg daily; lipid panel 1/13/21 improved and at goal \par - continue toprol 100 mg daily \par \par RTC 6 months on the same day as TTE

## 2022-01-14 NOTE — HISTORY OF PRESENT ILLNESS
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 \par \par Last seen 7/28//21, echo and carotid dopplers were ordered but not done. States home weight is stable in the range of 155 lbs-160 lbs. Pt states she lives at home with her daughterGrey and her family and lives on the first floor.  States she had a fall in her home in November and hit her had, back, hips and had been unsteady on her feet. Her grandson helped her up but  did not go for evaluation. She no longer walks outside but uses a cane when walking.  Not climbing stairs anymore due to fall and feels weaker.  States she gets episodes of dizziness  and states she gets episodes of chest and abdominal pains.  She sleeps with 2 pillows with no orthopnea. Denies diaphoresis and no syncope.  \par \mauricio Takes all medications and follows low sodium/fluid restriction. Denies palpitations/ICD shocks. Reports BPs at times are in 90s-100.. \par \mauricio Has not been hospitalized over past year. Denies fevers/chills. \par \par Pt had Covid vaccine x 2 without adverse reaction.

## 2022-01-14 NOTE — CARDIOLOGY SUMMARY
[de-identified] : 1/14/22 A-sensed, BiV paced\par 7/28/21 Atrial sensed with biventricular paced rhythm \par 1/27/21 - A-sensed, BiV paced\par  [de-identified] : \par 6/28/18 - pharmacologic nuclear stress - no ischemia\par   \par 6/15/17 - nuclear pharmacologic stress - EF 53%, small fixed apical inferoseptal pefusion defect (? artifact); no ischemia\par \par  [de-identified] : 3/19 - normal LV function and nl RV function \par \par 6/28/18 - EF 70%, small LV cavity, nl RV function\par \par 3/19 - normal LV function and nl RV function \par \par 6/28/18 - EF 70%, small LV cavity, nl RV function\par \par 3/19 - normal LV function and nl RV function \par \par 6/28/18 - EF 70%, small LV cavity, nl RV function\par \par 6/28/18 - EF 70%, small LV cavity, nl RV function\par \par \par  [de-identified] : Cardiac Cath:  \par 4/4/16 (MELANIA Johnson) - pLAD patent stent, dLAD 20% stenosis prior to stent, prox LCx 85% stenosis, distal LCx 75-90% stenosis (small sized), 1st OM 75% stenosis (large sized) s/p PCI, RCA patent, RPDA patent stent, RPL patent stent; EF 70%\par \par \par  \par

## 2022-01-14 NOTE — PHYSICAL EXAM
[No Acute Distress] : no acute distress [Normal Conjunctiva] : normal conjunctiva [Normal S1, S2] : normal S1, S2 [Clear Lung Fields] : clear lung fields [Good Air Entry] : good air entry [Soft] : abdomen soft [No Rash] : no rash [Normal] : alert and oriented, normal memory [de-identified] : Frail elderly female [de-identified] : JVP 6 cm  [de-identified] : Left chest CRT-D [de-identified] : trace lower extremity edema bilaterally [de-identified] : forgetful, unsteady on her feet

## 2022-01-14 NOTE — END OF VISIT
[FreeTextEntry3] : .review [Time Spent: ___ minutes] : I have spent [unfilled] minutes of time on the encounter.

## 2022-01-19 ENCOUNTER — NON-APPOINTMENT (OUTPATIENT)
Age: 77
End: 2022-01-19

## 2022-01-19 LAB
ALBUMIN SERPL ELPH-MCNC: 4.1 G/DL
ALP BLD-CCNC: 77 U/L
ALT SERPL-CCNC: 81 U/L
ANION GAP SERPL CALC-SCNC: 14 MMOL/L
AST SERPL-CCNC: 51 U/L
BILIRUB SERPL-MCNC: 0.7 MG/DL
BUN SERPL-MCNC: 26 MG/DL
CALCIUM SERPL-MCNC: 10.1 MG/DL
CHLORIDE SERPL-SCNC: 100 MMOL/L
CO2 SERPL-SCNC: 22 MMOL/L
CREAT SERPL-MCNC: 1.34 MG/DL
ESTIMATED AVERAGE GLUCOSE: 209 MG/DL
HBA1C MFR BLD HPLC: 8.9 %
NT-PROBNP SERPL-MCNC: 154 PG/ML
POTASSIUM SERPL-SCNC: 4.9 MMOL/L
PROT SERPL-MCNC: 8 G/DL
SODIUM SERPL-SCNC: 136 MMOL/L
TSH SERPL-ACNC: 1.66 UIU/ML

## 2022-01-21 NOTE — HISTORY OF PRESENT ILLNESS
[FreeTextEntry1] : This is a 76 year old woman w/ DM2, CHF, CAD, + AICD, here for f/u visit for DM2.\par Pt. very poor historian. \par \par She was seen by CDE 8/2016 for a post hospitalization visit. She had had hospital admission for chest pain, dizziness, and LE weakness. She had elevated BG at home and went to ED. A1c on admission = 13.4%.Cr = 1.94 but improved to 1.02 by d/c. Glucose on admission 900. Pt reports prior to hospital she was taking Lantus 10 daily and Humalog 5/7/7. She was d/c on BASAL/BOLUS insulin.\par \par She is currently on Levemir 16 units qhs (10pm) and Novolog 5-5-5 Hba1c 7 9/2020. It was 6.9 1/2021.\par \par She does not recall being on oral mediations for Type 2 DM.  She does\par She checks FS's 2x daily, but machine is only reporting one time/day reading.  \par Average 140mg/dl; fasting 115-177 mg/dl.  \par She had not had any episodes or sx of hypoglycemia in the last couple of months but did have some values in the 80's..\par \par She reports good appetite. Breakfast is usually a bagel w/ egg and tea or milk or porridge. Lunch is oatmeal/porridge or cornflakes or waffle or sandwich. Dinner is usually chicken and vegetables and rice. She doesn't eat a lot of sweets. Drinks only water and condensed milk. She walks up and down the stairs and walks around the house. She also does some stationary exercises.\par \par She had optho appt: has been a while since she last seen the eye doctor, she reports retinopathy s/p tx. She has cataracts in her eyes. \par \par She hasn't been followed with foot doctor yet. \par \par DEXA 2018 WNL.\par Abd US done 6/2020 showed simple renal cyst.\par

## 2022-01-21 NOTE — ASSESSMENT
[Diabetes Foot Care] : diabetes foot care [Long Term Vascular Complications] : long term vascular complications of diabetes [Carbohydrate Consistent Diet] : carbohydrate consistent diet [Importance of Diet and Exercise] : importance of diet and exercise to improve glycemic control, achieve weight loss and improve cardiovascular health [Exercise/Effect on Glucose] : exercise/effect on glucose [Hypoglycemia Management] : hypoglycemia management [Glucagon Use] : glucagon use [Ketone Testing] : ketone testing [Action and use of Insulin] : action and use of short and long-acting insulin [Self Monitoring of Blood Glucose] : self monitoring of blood glucose [Insulin Self-Administration] : insulin self-administration [Injection Technique, Storage, Sharps Disposal] : injection technique, storage, and sharps disposal [Sick-Day Management] : sick-day management [Retinopathy Screening] : Patient was referred to ophthalmology for retinopathy screening [FreeTextEntry1] : Patient is a 75-year-old woman with history of type 2 diabetes, CHF, CAD, AICD, here to follow-up for type 2 diabetes.\par Former patient of Dr. Rebecca Frye.\par \par \par 1. Type 2 diabetes\par Patient checking glucose once daily, although she reports that she has been checking twice daily.\par Glucometer downloaded consistent with 1 readings per day.\par Recommend patient to monitor 3-4 times daily.\par Interested in freestyle ila sensor.  She has used it in the past.\par Patient is on basal bolus regimen and will require frequent monitoring of glucose, 4 times daily to make further insulin adjustment.\par Given fasting glucose is consistently elevated between 115 to 177 mg/dL, recommend to increase Levemir from 16 to 17 units.\par Patient could potentially benefit from a GLP-1 receptor agonist.  For now she is interested in getting the sensor and that will require multiple daily injections.\par SGLT2 inhibitor was also discussed, however patient with polyuria, she currently uses a pamper for incontinence.\par \par Diabetes regimen summary:\par Levemir 16 --> 17 units\par NovoLog 5 units 3 times daily with meals\par Ophthalmology: need an appointment \par Podiatry: Foot exam 10/08/2021 within normal limits.  \par \par 2. Hypertension\par BP today 126/68\par eGFR 42 mL/min/1.73M2 (8/23/2021)\par Creatinine 1.24 8/23/2021\par Microalbumin/creatinine ratio unable to calculate Mar 2020\par We will repeat today\par BP medication:\par Metoprolol 100 mg once daily\par Losartan 50 mg once daily\par Spironolactone 25 mg once daily\par \par 3. Hyperlipidemia\par LDL 79 mg/dL (8/23/2021)\par Patient is currently on Zetia 10 mg once daily\par Rosuvastatin 40 mg once daily\par \par She will fax back glucose log in 2 to 3 weeks.\par We will order freestyle ila sensor for her.  Glucose log provided.

## 2022-01-27 ENCOUNTER — NON-APPOINTMENT (OUTPATIENT)
Age: 77
End: 2022-01-27

## 2022-02-08 ENCOUNTER — APPOINTMENT (OUTPATIENT)
Dept: ENDOCRINOLOGY | Facility: CLINIC | Age: 77
End: 2022-02-08
Payer: MEDICARE

## 2022-02-08 PROCEDURE — G0108 DIAB MANAGE TRN  PER INDIV: CPT

## 2022-03-04 ENCOUNTER — NON-APPOINTMENT (OUTPATIENT)
Age: 77
End: 2022-03-04

## 2022-03-04 ENCOUNTER — LABORATORY RESULT (OUTPATIENT)
Age: 77
End: 2022-03-04

## 2022-03-04 ENCOUNTER — APPOINTMENT (OUTPATIENT)
Dept: INTERNAL MEDICINE | Facility: CLINIC | Age: 77
End: 2022-03-04
Payer: MEDICARE

## 2022-03-04 VITALS — HEIGHT: 62 IN | WEIGHT: 159.2 LBS | BODY MASS INDEX: 29.3 KG/M2

## 2022-03-04 VITALS — SYSTOLIC BLOOD PRESSURE: 140 MMHG | DIASTOLIC BLOOD PRESSURE: 70 MMHG

## 2022-03-04 DIAGNOSIS — Z00.00 ENCOUNTER FOR GENERAL ADULT MEDICAL EXAMINATION W/OUT ABNORMAL FINDINGS: ICD-10-CM

## 2022-03-04 PROCEDURE — 36415 COLL VENOUS BLD VENIPUNCTURE: CPT

## 2022-03-04 PROCEDURE — 93000 ELECTROCARDIOGRAM COMPLETE: CPT | Mod: 59

## 2022-03-04 PROCEDURE — 99213 OFFICE O/P EST LOW 20 MIN: CPT | Mod: 25

## 2022-03-04 PROCEDURE — G0439: CPT

## 2022-03-04 NOTE — HISTORY OF PRESENT ILLNESS
[de-identified] : This is a 76 -year-old patient with a history of cardiomyopathy ischemic with a decreased ejection fraction for which she needs a pacemaker and defibrillator, in addition she has a history of diabetes mellitus complicated by diabetic neuropathy and mild renal insufficiency.  She also has a history of cystic kidneys and presently is complaining of right mid epigastric pain

## 2022-03-04 NOTE — ASSESSMENT
[FreeTextEntry1] : This is a 76-year-old patient with a host of medical problems.  She has a history of hypertension, ischemic cardiomyopathy, low ejection fraction, placement of pacemaker defibrillator, diabetes mellitus diabetes polyneuropathy \par Assessment\par Her blood pressure was well controlled.  The patient feels well and her physical examination was normal aside from some diminution in her reflexes and vibratory sensation.  Her position sense was normal.  There was no evidence of congestive heart failure.

## 2022-03-04 NOTE — HEALTH RISK ASSESSMENT
[No] : No [No falls in past year] : Patient reported no falls in the past year [0] : 2) Feeling down, depressed, or hopeless: Not at all (0) [PHQ-2 Negative - No further assessment needed] : PHQ-2 Negative - No further assessment needed [Fully functional (bathing, dressing, toileting, transferring, walking, feeding)] : Fully functional (bathing, dressing, toileting, transferring, walking, feeding) [Fully functional (using the telephone, shopping, preparing meals, housekeeping, doing laundry, using] : Fully functional and needs no help or supervision to perform IADLs (using the telephone, shopping, preparing meals, housekeeping, doing laundry, using transportation, managing medications and managing finances) [[id number]] : 0

## 2022-03-15 LAB
25(OH)D3 SERPL-MCNC: 38.2 NG/ML
ALBUMIN SERPL ELPH-MCNC: 3.9 G/DL
ALP BLD-CCNC: 79 U/L
ALT SERPL-CCNC: 84 U/L
ANION GAP SERPL CALC-SCNC: 9 MMOL/L
APPEARANCE: CLEAR
AST SERPL-CCNC: 68 U/L
BACTERIA: NEGATIVE
BASOPHILS # BLD AUTO: 0.06 K/UL
BASOPHILS NFR BLD AUTO: 1 %
BILIRUB SERPL-MCNC: 0.5 MG/DL
BILIRUBIN URINE: NEGATIVE
BLOOD URINE: NEGATIVE
BUN SERPL-MCNC: 23 MG/DL
CALCIUM SERPL-MCNC: 9.6 MG/DL
CHLORIDE SERPL-SCNC: 105 MMOL/L
CHOLEST SERPL-MCNC: 135 MG/DL
CO2 SERPL-SCNC: 25 MMOL/L
COLOR: NORMAL
CREAT SERPL-MCNC: 1.19 MG/DL
EGFR: 47 ML/MIN/1.73M2
EOSINOPHIL # BLD AUTO: 0.06 K/UL
EOSINOPHIL NFR BLD AUTO: 1 %
ESTIMATED AVERAGE GLUCOSE: 214 MG/DL
FERRITIN SERPL-MCNC: 221 NG/ML
FOLATE SERPL-MCNC: 9.1 NG/ML
GLUCOSE QUALITATIVE U: NEGATIVE
GLUCOSE SERPL-MCNC: 162 MG/DL
HBA1C MFR BLD HPLC: 9.1 %
HCT VFR BLD CALC: 41.7 %
HDLC SERPL-MCNC: 42 MG/DL
HGB BLD-MCNC: 13.3 G/DL
HYALINE CASTS: 0 /LPF
IMM GRANULOCYTES NFR BLD AUTO: 0.2 %
KETONES URINE: NEGATIVE
LDLC SERPL CALC-MCNC: 72 MG/DL
LEUKOCYTE ESTERASE URINE: NEGATIVE
LYMPHOCYTES # BLD AUTO: 1.93 K/UL
LYMPHOCYTES NFR BLD AUTO: 31.3 %
MAN DIFF?: NORMAL
MCHC RBC-ENTMCNC: 31.1 PG
MCHC RBC-ENTMCNC: 31.9 GM/DL
MCV RBC AUTO: 97.7 FL
MICROSCOPIC-UA: NORMAL
MONOCYTES # BLD AUTO: 0.75 K/UL
MONOCYTES NFR BLD AUTO: 12.2 %
NEUTROPHILS # BLD AUTO: 3.35 K/UL
NEUTROPHILS NFR BLD AUTO: 54.3 %
NITRITE URINE: NEGATIVE
NONHDLC SERPL-MCNC: 93 MG/DL
PH URINE: 6
PLATELET # BLD AUTO: 106 K/UL
POTASSIUM SERPL-SCNC: 4.6 MMOL/L
PROT SERPL-MCNC: 7 G/DL
PROTEIN URINE: NEGATIVE
RBC # BLD: 4.27 M/UL
RBC # FLD: 14.1 %
RED BLOOD CELLS URINE: 1 /HPF
SODIUM SERPL-SCNC: 140 MMOL/L
SPECIFIC GRAVITY URINE: 1.01
SQUAMOUS EPITHELIAL CELLS: 1 /HPF
T3RU NFR SERPL: 1.1 TBI
T4 SERPL-MCNC: 5.6 UG/DL
TRIGL SERPL-MCNC: 104 MG/DL
TSH SERPL-ACNC: 1.39 UIU/ML
URATE SERPL-MCNC: 4.9 MG/DL
UROBILINOGEN URINE: NORMAL
VIT B12 SERPL-MCNC: 871 PG/ML
WBC # FLD AUTO: 6.16 K/UL
WHITE BLOOD CELLS URINE: 1 /HPF

## 2022-03-29 ENCOUNTER — APPOINTMENT (OUTPATIENT)
Dept: OPHTHALMOLOGY | Facility: CLINIC | Age: 77
End: 2022-03-29
Payer: MEDICARE

## 2022-03-29 ENCOUNTER — RX RENEWAL (OUTPATIENT)
Age: 77
End: 2022-03-29

## 2022-03-29 ENCOUNTER — NON-APPOINTMENT (OUTPATIENT)
Age: 77
End: 2022-03-29

## 2022-03-29 PROCEDURE — 92014 COMPRE OPH EXAM EST PT 1/>: CPT

## 2022-03-29 PROCEDURE — 92134 CPTRZ OPH DX IMG PST SGM RTA: CPT

## 2022-03-29 PROCEDURE — 92015 DETERMINE REFRACTIVE STATE: CPT | Mod: NC

## 2022-04-13 ENCOUNTER — APPOINTMENT (OUTPATIENT)
Dept: OTOLARYNGOLOGY | Facility: CLINIC | Age: 77
End: 2022-04-13
Payer: MEDICARE

## 2022-04-13 VITALS
TEMPERATURE: 96.6 F | BODY MASS INDEX: 29.26 KG/M2 | SYSTOLIC BLOOD PRESSURE: 119 MMHG | WEIGHT: 159 LBS | DIASTOLIC BLOOD PRESSURE: 68 MMHG | HEIGHT: 62 IN | HEART RATE: 60 BPM

## 2022-04-13 PROCEDURE — G0268 REMOVAL OF IMPACTED WAX MD: CPT

## 2022-04-13 PROCEDURE — 99214 OFFICE O/P EST MOD 30 MIN: CPT | Mod: 25

## 2022-04-13 PROCEDURE — 92557 COMPREHENSIVE HEARING TEST: CPT

## 2022-04-13 PROCEDURE — 92567 TYMPANOMETRY: CPT

## 2022-04-13 NOTE — END OF VISIT
[FreeTextEntry3] : I personally saw and examined JANKI NEWSOME in detail.  I spoke to CHAYA Valentine regarding the assessment and plan of care. I performed the procedures and relevant physical exam.  I have reviewed the above assessment and plan of care and I agree.  I have made changes to the body of the note wherever necessary and appropriate.

## 2022-04-13 NOTE — HISTORY OF PRESENT ILLNESS
[de-identified] : Ms. NEWSOME is a 76 year female c/o sharp right ear pain x 2 months. denies otorrhea, tinnitus, vertigo and bilateral hearing loss. + sore throat dysphagia on right side x 2 months as well, it comes and goes worse at night, no smoking history\par denies sinus pressure, rhinorrhea, nasal obstruction\par \par pt uses a hair pin and q tip to remove wax from her ear\par \par \par

## 2022-04-13 NOTE — ASSESSMENT
[FreeTextEntry1] : Ms. NEWSOME is a 76 year female with right otalgia found to have b/l cerumen impaction today which was removed successfully today and pt felt immediate relief. pt deferred laryngoscopy today.\par \par - audio done today - mild asymmetry will monitor\par - hearing aid evaluation \par - medically cleared for hearing aids \par - annual audio\par - Pantoprazole 40mg po qd x 3 weeks, f/up in 4 weeks\par - if still with significant throat pain will encourage to attempt laryngoscopy again \par \par f/up 1 month

## 2022-04-13 NOTE — CONSULT LETTER
[Dear  ___] : Dear  [unfilled], [Consult Letter:] : I had the pleasure of evaluating your patient, [unfilled]. [Please see my note below.] : Please see my note below. [Consult Closing:] : Thank you very much for allowing me to participate in the care of this patient.  If you have any questions, please do not hesitate to contact me. [FreeTextEntry3] : Sincerely, \par \par Yudi Johnson MD\par Otolaryngology- Facial Plastics \par [address] [facility] [address]\[place] [address]\par (P) - [phone]\par (F) - [phone]

## 2022-04-13 NOTE — PROCEDURE
[FreeTextEntry2] : cerumen impaction\par  [FreeTextEntry3] : After informed verbal consent is obtained, cerumen is removed from the b/l ear canal with a curette & suction after peroxide soak. Pt tolerated well, no residual ear canal irritation. \par \par peroxide soak used  [de-identified] : attempted, but pt declined

## 2022-04-13 NOTE — DATA REVIEWED
[de-identified] : 04/13/22: RT: WNL to severe SNHL and LT: mild/mod to severe SNHL\par normal type A tymp AU

## 2022-04-21 ENCOUNTER — NON-APPOINTMENT (OUTPATIENT)
Age: 77
End: 2022-04-21

## 2022-04-26 ENCOUNTER — APPOINTMENT (OUTPATIENT)
Dept: INTERNAL MEDICINE | Facility: CLINIC | Age: 77
End: 2022-04-26
Payer: MEDICARE

## 2022-04-26 VITALS
WEIGHT: 159 LBS | BODY MASS INDEX: 29.26 KG/M2 | DIASTOLIC BLOOD PRESSURE: 72 MMHG | HEIGHT: 62 IN | OXYGEN SATURATION: 97 % | TEMPERATURE: 98 F | HEART RATE: 62 BPM | SYSTOLIC BLOOD PRESSURE: 126 MMHG

## 2022-04-26 DIAGNOSIS — R74.8 ABNORMAL LEVELS OF OTHER SERUM ENZYMES: ICD-10-CM

## 2022-04-26 DIAGNOSIS — H91.93 UNSPECIFIED HEARING LOSS, BILATERAL: ICD-10-CM

## 2022-04-26 PROCEDURE — 99204 OFFICE O/P NEW MOD 45 MIN: CPT

## 2022-04-26 NOTE — HISTORY OF PRESENT ILLNESS
[FreeTextEntry1] : GI pain started 6 months ago- pain mainly at night. She has early satiety and has constipation. H/o diabetes  last A1c 9.1.  has cardiomyopathy with PPM and defibrillator she also has a mild elevation in lever enzymes. first found in 2020\par  she had U/s in 2020 which was normal\par  4/2013 dr. Mejia stated her on pantoprazole but she apprently is not taking it. She is not losing weight

## 2022-04-26 NOTE — ASSESSMENT
[FreeTextEntry1] : yumiko diabetic gastroparesis. too high risk for EGD due to her cardiac disease. \par will check U/S and Try PPI

## 2022-04-26 NOTE — PHYSICAL EXAM
[General Appearance - Alert] : alert [General Appearance - In No Acute Distress] : in no acute distress [Sclera] : the sclera and conjunctiva were normal [PERRL With Normal Accommodation] : pupils were equal in size, round, and reactive to light [Extraocular Movements] : extraocular movements were intact [Outer Ear] : the ears and nose were normal in appearance [Oropharynx] : the oropharynx was normal [Neck Appearance] : the appearance of the neck was normal [Neck Cervical Mass (___cm)] : no neck mass was observed [Jugular Venous Distention Increased] : there was no jugular-venous distention [Thyroid Diffuse Enlargement] : the thyroid was not enlarged [Thyroid Nodule] : there were no palpable thyroid nodules [Auscultation Breath Sounds / Voice Sounds] : lungs were clear to auscultation bilaterally [Heart Rate And Rhythm] : heart rate was normal and rhythm regular [Heart Sounds] : normal S1 and S2 [Heart Sounds Gallop] : no gallops [Murmurs] : no murmurs [Heart Sounds Pericardial Friction Rub] : no pericardial rub [Bowel Sounds] : normal bowel sounds [Abdomen Soft] : soft [FreeTextEntry1] : epigastric tenderness [Cervical Lymph Nodes Enlarged Posterior Bilaterally] : posterior cervical [Cervical Lymph Nodes Enlarged Anterior Bilaterally] : anterior cervical [Supraclavicular Lymph Nodes Enlarged Bilaterally] : supraclavicular [Axillary Lymph Nodes Enlarged Bilaterally] : axillary [Femoral Lymph Nodes Enlarged Bilaterally] : femoral [Inguinal Lymph Nodes Enlarged Bilaterally] : inguinal [Skin Color & Pigmentation] : normal skin color and pigmentation [Skin Turgor] : normal skin turgor [] : no rash [Deep Tendon Reflexes (DTR)] : deep tendon reflexes were 2+ and symmetric [Sensation] : the sensory exam was normal to light touch and pinprick [No Focal Deficits] : no focal deficits [Oriented To Time, Place, And Person] : oriented to person, place, and time [Impaired Insight] : insight and judgment were intact [Affect] : the affect was normal

## 2022-05-11 ENCOUNTER — APPOINTMENT (OUTPATIENT)
Dept: OTOLARYNGOLOGY | Facility: CLINIC | Age: 77
End: 2022-05-11
Payer: MEDICARE

## 2022-05-11 VITALS — HEART RATE: 59 BPM | SYSTOLIC BLOOD PRESSURE: 124 MMHG | DIASTOLIC BLOOD PRESSURE: 71 MMHG

## 2022-05-11 PROCEDURE — 99213 OFFICE O/P EST LOW 20 MIN: CPT

## 2022-05-11 NOTE — ASSESSMENT
[FreeTextEntry1] : Ms. NEWSOME is a 76 year female with reflux throat pain now 50% better on PPI. Would treat with PPI for another 2 months\par \par - continue Pantoprazole 40mg po qd x 2 more months\par - Pt should f/u with GI after abdominal  ultrasound for possible gastroparesis\par - annual audio\par \par f/up 2 months, may dc PPI if no longer needed by GI.

## 2022-05-11 NOTE — HISTORY OF PRESENT ILLNESS
[de-identified] : Ms. NEWSOME is a 76 year female c/o sharp right ear pain x 2 months. denies otorrhea, tinnitus, vertigo and bilateral hearing loss. + sore throat dysphagia on right side x 2 months as well, it comes and goes worse at night, no smoking history\par \par \par \par \par  [FreeTextEntry1] : pt has been taking pantoprazole and feels it is 50% better, and her hearing has been better since cerumen removal

## 2022-05-16 ENCOUNTER — APPOINTMENT (OUTPATIENT)
Dept: PHARMACY | Facility: CLINIC | Age: 77
End: 2022-05-16

## 2022-06-17 NOTE — ED ADULT NURSE NOTE - AS O2 DELIVERY
[facility]  *Breastfeeding Support Group: [phone]    Breastfeeding Support Group Zoom every Tuesday 2:30 pm to 3:30 pm   Zoom ID # [phone]  Dial in #: [phone] room air

## 2022-06-28 NOTE — HISTORY OF PRESENT ILLNESS
[de-identified] : This is a 75-year-old patient with a history of significant ischemic cardiomyopathy with congestive failure and the placement of an AICD. She also has a history of cervical radiculopathy, diabetes mellitus, peripheral neuropathy and status post CVA. Her main complaint today is episodes of right upper quadrant discomfort Adult

## 2022-07-13 ENCOUNTER — APPOINTMENT (OUTPATIENT)
Dept: OTOLARYNGOLOGY | Facility: CLINIC | Age: 77
End: 2022-07-13

## 2022-07-15 ENCOUNTER — APPOINTMENT (OUTPATIENT)
Dept: CARDIOLOGY | Facility: CLINIC | Age: 77
End: 2022-07-15

## 2022-07-15 ENCOUNTER — APPOINTMENT (OUTPATIENT)
Dept: ELECTROPHYSIOLOGY | Facility: CLINIC | Age: 77
End: 2022-07-15

## 2022-07-15 ENCOUNTER — OUTPATIENT (OUTPATIENT)
Dept: OUTPATIENT SERVICES | Facility: HOSPITAL | Age: 77
LOS: 1 days | End: 2022-07-15

## 2022-07-15 ENCOUNTER — APPOINTMENT (OUTPATIENT)
Dept: CV DIAGNOSITCS | Facility: HOSPITAL | Age: 77
End: 2022-07-15

## 2022-07-15 DIAGNOSIS — Z95.810 PRESENCE OF AUTOMATIC (IMPLANTABLE) CARDIAC DEFIBRILLATOR: Chronic | ICD-10-CM

## 2022-07-15 DIAGNOSIS — Z95.0 PRESENCE OF CARDIAC PACEMAKER: Chronic | ICD-10-CM

## 2022-07-15 DIAGNOSIS — Z95.5 PRESENCE OF CORONARY ANGIOPLASTY IMPLANT AND GRAFT: Chronic | ICD-10-CM

## 2022-07-15 DIAGNOSIS — I50.22 CHRONIC SYSTOLIC (CONGESTIVE) HEART FAILURE: ICD-10-CM

## 2022-07-15 PROCEDURE — 93306 TTE W/DOPPLER COMPLETE: CPT | Mod: 26

## 2022-08-08 ENCOUNTER — APPOINTMENT (OUTPATIENT)
Dept: INTERNAL MEDICINE | Facility: CLINIC | Age: 77
End: 2022-08-08

## 2022-08-08 ENCOUNTER — LABORATORY RESULT (OUTPATIENT)
Age: 77
End: 2022-08-08

## 2022-08-08 VITALS — DIASTOLIC BLOOD PRESSURE: 70 MMHG | SYSTOLIC BLOOD PRESSURE: 120 MMHG

## 2022-08-08 PROCEDURE — 99214 OFFICE O/P EST MOD 30 MIN: CPT | Mod: 25

## 2022-08-08 PROCEDURE — 36415 COLL VENOUS BLD VENIPUNCTURE: CPT

## 2022-08-08 NOTE — ASSESSMENT
[FreeTextEntry1] : Problems\par Congestive cardiomyopathy\par CHF\par Pacemaker defibrillator\par Diabetes mellitus\par Diabetic neuropathy\par Ischemic cardiomyopathy\par Hypercholesterolemia\par Hypertension \par Assessment\par The patient feels well from a cardiological standpoint.  She denies any resting chest pain or shortness of breath.  In addition she denies any pedal edema.  I spoke to the patient about her uncontrolled diabetes and she admits that she has not seen the endocrinologist as of yet.  I reviewed diet with her both low-sodium low-cholesterol.  Her blood pressure was well well controlled at 120/80 her physical examination did not reveal any presence of neck veins rales or peripheral edema.  And she is scheduled to see her cardiologist\par

## 2022-08-08 NOTE — DISCUSSION/SUMMARY
[FreeTextEntry1] : I spoke to the patient's daughter about the mother's A1c being 9. 1 and her sugar greater than 200.  I referred the patient to her endocrinologist

## 2022-08-08 NOTE — HISTORY OF PRESENT ILLNESS
[de-identified] : This is a 77 year-old patient with a history of ischemic cardiomyopathy requiring the placement of a defibrillator pacemaker who also has episodes of congestive failure.  In addition the patient has history of diabetes mellitus complicated by diabetic polyneuropathy and mild renal insufficiency.  In addition on last visit her A1c was 9.2.  And I referred her to endocrinology

## 2022-08-09 LAB
ALBUMIN SERPL ELPH-MCNC: 4.2 G/DL
ALP BLD-CCNC: 94 U/L
ALT SERPL-CCNC: 41 U/L
ANION GAP SERPL CALC-SCNC: 8 MMOL/L
AST SERPL-CCNC: 41 U/L
BASOPHILS # BLD AUTO: 0.04 K/UL
BASOPHILS NFR BLD AUTO: 0.6 %
BILIRUB SERPL-MCNC: 0.6 MG/DL
BUN SERPL-MCNC: 25 MG/DL
CALCIUM SERPL-MCNC: 10.1 MG/DL
CHLORIDE SERPL-SCNC: 107 MMOL/L
CHOLEST SERPL-MCNC: 162 MG/DL
CO2 SERPL-SCNC: 26 MMOL/L
CREAT SERPL-MCNC: 1.3 MG/DL
EGFR: 42 ML/MIN/1.73M2
EOSINOPHIL # BLD AUTO: 0.05 K/UL
EOSINOPHIL NFR BLD AUTO: 0.8 %
ESTIMATED AVERAGE GLUCOSE: 160 MG/DL
FERRITIN SERPL-MCNC: 281 NG/ML
FOLATE SERPL-MCNC: >20 NG/ML
GLUCOSE SERPL-MCNC: 98 MG/DL
HBA1C MFR BLD HPLC: 7.2 %
HCT VFR BLD CALC: 42.1 %
HDLC SERPL-MCNC: 45 MG/DL
HGB BLD-MCNC: 13.5 G/DL
IMM GRANULOCYTES NFR BLD AUTO: 0.3 %
LDLC SERPL CALC-MCNC: 98 MG/DL
LYMPHOCYTES # BLD AUTO: 1.92 K/UL
LYMPHOCYTES NFR BLD AUTO: 29.6 %
MAN DIFF?: NORMAL
MCHC RBC-ENTMCNC: 31.6 PG
MCHC RBC-ENTMCNC: 32.1 GM/DL
MCV RBC AUTO: 98.6 FL
MONOCYTES # BLD AUTO: 0.7 K/UL
MONOCYTES NFR BLD AUTO: 10.8 %
NEUTROPHILS # BLD AUTO: 3.76 K/UL
NEUTROPHILS NFR BLD AUTO: 57.9 %
NONHDLC SERPL-MCNC: 117 MG/DL
PLATELET # BLD AUTO: 125 K/UL
POTASSIUM SERPL-SCNC: 4.6 MMOL/L
PROT SERPL-MCNC: 7.6 G/DL
RBC # BLD: 4.27 M/UL
RBC # FLD: 14.8 %
SODIUM SERPL-SCNC: 141 MMOL/L
T3RU NFR SERPL: 1.1 TBI
T4 SERPL-MCNC: 5.7 UG/DL
TRIGL SERPL-MCNC: 97 MG/DL
TSH SERPL-ACNC: 1.86 UIU/ML
URATE SERPL-MCNC: 5.1 MG/DL
VIT B12 SERPL-MCNC: 812 PG/ML
WBC # FLD AUTO: 6.49 K/UL

## 2022-08-16 ENCOUNTER — APPOINTMENT (OUTPATIENT)
Dept: ENDOCRINOLOGY | Facility: CLINIC | Age: 77
End: 2022-08-16

## 2022-08-16 VITALS
TEMPERATURE: 97.2 F | OXYGEN SATURATION: 92 % | HEIGHT: 62 IN | DIASTOLIC BLOOD PRESSURE: 60 MMHG | SYSTOLIC BLOOD PRESSURE: 120 MMHG | WEIGHT: 155 LBS | BODY MASS INDEX: 28.52 KG/M2 | HEART RATE: 60 BPM

## 2022-08-16 PROCEDURE — 99214 OFFICE O/P EST MOD 30 MIN: CPT

## 2022-08-16 NOTE — THERAPY
[Today's Date] : [unfilled] [Levemir] : Levemir [Novolog] : Novolog [FreeTextEntry9] : 18 units at bedtime [de-identified] : 7 units with each meals

## 2022-08-16 NOTE — HISTORY OF PRESENT ILLNESS
[FreeTextEntry1] : This is a 76 year old woman w/ DM2, CHF, CAD, + AICD, here for f/u visit for DM2.\par Pt. very poor historian. \par \par She was seen by CDE 8/2016 for a post hospitalization visit. She had had hospital admission for chest pain, dizziness, and LE weakness. She had elevated BG at home and went to ED. A1c on admission = 13.4%.Cr = 1.94 but improved to 1.02 by d/c. Glucose on admission 900. Pt reports prior to hospital she was taking Lantus 10 daily and Humalog 5/7/7. She was d/c on BASAL/BOLUS insulin.\par \par Checking glucose twice daily. \par Fasting 139 121 127 123 110 142 147 \par 12 PM: 121 125 111 127 146 128\par \par 24 hour food recall:\par Breakfast: Whole wheat bagel\par Lunch: protein, vegetables, and a Carb, everything whole wheat. \par Dinner: protein, vegetables, and a Carb, everything whole wheat. \par She has been drinking SOURSOP leave tea which she thinks has been helping her with her sugar. \par \par She had optho appt: She is up to date with eye doctor, she had cataract that's removed and she has a history of diabetic retinopathy. Dr. Ramirez \par \par She hasn't been followed with foot doctor yet. \par \par DEXA 2018 WNL.\par \par Abd US done 6/2020 showed simple renal cyst.\par \par History of hypertension, managed by cardiology.  History of CHF, also with cardiology follow-up.\par \par History of hyperlipidemia, currently on Zetia 10 mg daily.

## 2022-08-16 NOTE — ASSESSMENT
[FreeTextEntry1] : Patient is a 77-year-old woman with history of type 2 diabetes, CHF, CAD, AICD, here to follow-up for type 2 diabetes.\par \par \par 1. Type 2 diabetes\par A1c 7.2% 8/8/2022\par Checking glucose twice daily.\par Recommend patient to monitor 3-4 times daily.\par Interested in freestyle ila sensor.  She has used it in the past.\par Patient is on basal bolus regimen and will require frequent monitoring of glucose, 4 times daily to make further insulin adjustment.\par SGLT2 inhibitor was also discussed, however patient with polyuria, she currently uses a pamper for incontinence.\par Diabetes regimen summary:\par Levemir 18 units at bedtime\par NovoLog 7 units 3 times daily with meals\par Starting Jardiance 10 mg once daily.\par \par Ophthalmology: need an appointment \par Podiatry: Foot exam 10/08/2021 within normal limits.  \par \par 2. Hypertension\par Blood pressure in control.\par Continue to follow with cardiology.\par eGFR 42 mL/min/1.73M2 (8/23/2021)\par Creatinine 1.24 8/23/2021\par Microalbumin/creatinine ratio unable to calculate Mar 2020\par We will repeat today\par BP medication:\par Metoprolol 100 mg once daily\par Losartan 50 mg once daily\par Spironolactone 25 mg once daily, of note starting SGLT2 inhibitor with Jardiance 10 mg once daily.  Will let cardiologist know.\par \par 3. Hyperlipidemia\par LDL 79 mg/dL (8/23/2021)\par Patient is currently on Zetia 10 mg once daily\par Rosuvastatin 40 mg once daily\par \par 4.  Osteoporosis screening\par Obtain bone mineral density.\par \par Follow-up with CDE in 3 months\par Follow-up with me in 6 months. [Diabetes Foot Care] : diabetes foot care [Long Term Vascular Complications] : long term vascular complications of diabetes [Carbohydrate Consistent Diet] : carbohydrate consistent diet [Importance of Diet and Exercise] : importance of diet and exercise to improve glycemic control, achieve weight loss and improve cardiovascular health [Exercise/Effect on Glucose] : exercise/effect on glucose [Hypoglycemia Management] : hypoglycemia management [Glucagon Use] : glucagon use [Ketone Testing] : ketone testing [Action and use of Insulin] : action and use of short and long-acting insulin [Self Monitoring of Blood Glucose] : self monitoring of blood glucose [Insulin Self-Administration] : insulin self-administration [Injection Technique, Storage, Sharps Disposal] : injection technique, storage, and sharps disposal [Sick-Day Management] : sick-day management [Retinopathy Screening] : Patient was referred to ophthalmology for retinopathy screening

## 2022-08-16 NOTE — RESULTS/DATA
[FreeTextEntry1] : 5/4/2018\par Femoral neck 1.0 normal\par Total hip 1.3 normal\par Spine 0.5 normal

## 2022-08-17 LAB
CREAT SPEC-SCNC: 145 MG/DL
MICROALBUMIN 24H UR DL<=1MG/L-MCNC: <1.2 MG/DL
MICROALBUMIN/CREAT 24H UR-RTO: NORMAL MG/G

## 2022-08-24 ENCOUNTER — RX RENEWAL (OUTPATIENT)
Age: 77
End: 2022-08-24

## 2022-08-25 ENCOUNTER — APPOINTMENT (OUTPATIENT)
Dept: ENDOCRINOLOGY | Facility: CLINIC | Age: 77
End: 2022-08-25

## 2022-08-25 VITALS — BODY MASS INDEX: 29.64 KG/M2 | WEIGHT: 151 LBS | HEIGHT: 60 IN | TEMPERATURE: 97 F

## 2022-08-25 PROCEDURE — 77085 DXA BONE DENSITY AXL VRT FX: CPT

## 2022-09-27 ENCOUNTER — APPOINTMENT (OUTPATIENT)
Dept: OPHTHALMOLOGY | Facility: CLINIC | Age: 77
End: 2022-09-27

## 2022-10-26 ENCOUNTER — APPOINTMENT (OUTPATIENT)
Dept: HEART FAILURE | Facility: CLINIC | Age: 77
End: 2022-10-26

## 2022-10-31 NOTE — DISCHARGE NOTE ADULT - [id number]_GEN_ALL_CORE
medication teaching and assessment/rehabilitation services/observation and assessment/teaching and training Complex Repair And O-L Flap Text: The defect edges were debeveled with a #15 scalpel blade.  The primary defect was closed partially with a complex linear closure.  Given the location of the remaining defect, shape of the defect and the proximity to free margins an O-L flap was deemed most appropriate for complete closure of the defect.  Using a sterile surgical marker, an appropriate flap was drawn incorporating the defect and placing the expected incisions within the relaxed skin tension lines where possible.    The area thus outlined was incised deep to adipose tissue with a #15 scalpel blade.  The skin margins were undermined to an appropriate distance in all directions utilizing iris scissors.

## 2022-11-01 ENCOUNTER — RX RENEWAL (OUTPATIENT)
Age: 77
End: 2022-11-01

## 2022-11-07 NOTE — HISTORY OF PRESENT ILLNESS
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 CRT-D, CAD s/p several PCI (last 2016; no h/o MI), HTN who is here for follow up. \par \par She was last seen in NP clinic on 3/19 and had been doing well. Weight is stable in the range of 164 lbs. Pt states she lives at home with her daughter. She is able to walk around in her home from room to room without dyspnea. She did home PT which she completed, now is looking to attend at [employer] but doesn't have transportation. Able to climb 9-10 stairs w/o CP/SOB. She sleeps with 2 pillows with no orthopnea. Reports some L sided chest pain that started 1 week prior that is non-radiating, not related to exertion and may be exacerbated with lifting items. Denies associated SOB/dizziness/diaphoresis. States different from prior. \par \par Takes all medications and follows low sodium/fluid restriction. Denies palpitations/ICD shocks.

## 2022-11-08 ENCOUNTER — NON-APPOINTMENT (OUTPATIENT)
Age: 77
End: 2022-11-08

## 2022-11-08 ENCOUNTER — LABORATORY RESULT (OUTPATIENT)
Age: 77
End: 2022-11-08

## 2022-11-08 ENCOUNTER — APPOINTMENT (OUTPATIENT)
Dept: INTERNAL MEDICINE | Facility: CLINIC | Age: 77
End: 2022-11-08

## 2022-11-08 VITALS — HEIGHT: 60 IN | WEIGHT: 149 LBS | BODY MASS INDEX: 29.25 KG/M2

## 2022-11-08 VITALS — DIASTOLIC BLOOD PRESSURE: 70 MMHG | SYSTOLIC BLOOD PRESSURE: 120 MMHG

## 2022-11-08 PROCEDURE — 36415 COLL VENOUS BLD VENIPUNCTURE: CPT

## 2022-11-08 PROCEDURE — G0008: CPT

## 2022-11-08 PROCEDURE — 90662 IIV NO PRSV INCREASED AG IM: CPT

## 2022-11-08 PROCEDURE — 93000 ELECTROCARDIOGRAM COMPLETE: CPT | Mod: 59

## 2022-11-08 PROCEDURE — 99214 OFFICE O/P EST MOD 30 MIN: CPT | Mod: 25

## 2022-11-08 NOTE — HISTORY OF PRESENT ILLNESS
[de-identified] : This is a 77 year-old patient with a history of ischemic cardiomyopathy requiring the placement of a defibrillator pacemaker who also has episodes of congestive failure.  In addition the patient has history of diabetes mellitus complicated by diabetic polyneuropathy and mild renal insufficiency.  In addition on last visit her A1c was 9.2.  And I referred her to endocrinology.  The patient has been evaluated by endocrinology and her A1c is still not well controlled.

## 2022-11-08 NOTE — ASSESSMENT
[FreeTextEntry1] : Problems\par Congestive cardiomyopathy\par CHF\par Pacemaker defibrillator\par Diabetes mellitus\par Diabetic neuropathy\par Ischemic cardiomyopathy\par Hypercholesterolemia\par Hypertension \par Assessment\par The patient feels well from a cardiological standpoint.  She denies any resting chest pain or shortness of breath.  In addition she denies any pedal edema.  I spoke to the patient about her uncontrolled diabetes and she admits that she has not seen the endocrinologist as of yet.  I reviewed diet with her both low-sodium low-cholesterol.  Her blood pressure was well well controlled at 120/80 her physical examination did not reveal any presence of neck veins rales or peripheral edema.  And she is scheduled to see her cardiologist in addition the daughter informs me that the mother has had 2 episodes of hypoglycemia after the Jardiance was given.  I informed him to order not to give her the Jardiance and we will check her bloods and A1c today\par

## 2022-11-11 LAB
ALBUMIN SERPL ELPH-MCNC: 4 G/DL
ALP BLD-CCNC: 80 U/L
ALT SERPL-CCNC: 48 U/L
ANION GAP SERPL CALC-SCNC: 9 MMOL/L
AST SERPL-CCNC: 44 U/L
BASOPHILS # BLD AUTO: 0.05 K/UL
BASOPHILS NFR BLD AUTO: 0.8 %
BILIRUB SERPL-MCNC: 0.6 MG/DL
BUN SERPL-MCNC: 29 MG/DL
CALCIUM SERPL-MCNC: 10 MG/DL
CHLORIDE SERPL-SCNC: 104 MMOL/L
CHOLEST SERPL-MCNC: 135 MG/DL
CK SERPL-CCNC: 215 U/L
CO2 SERPL-SCNC: 27 MMOL/L
CREAT SERPL-MCNC: 1.28 MG/DL
EGFR: 43 ML/MIN/1.73M2
EOSINOPHIL # BLD AUTO: 0.05 K/UL
EOSINOPHIL NFR BLD AUTO: 0.8 %
ESTIMATED AVERAGE GLUCOSE: 154 MG/DL
FERRITIN SERPL-MCNC: 261 NG/ML
FOLATE SERPL-MCNC: 9.7 NG/ML
GLUCOSE SERPL-MCNC: 70 MG/DL
HAV IGM SER QL: NONREACTIVE
HBA1C MFR BLD HPLC: 7 %
HBV CORE IGM SER QL: NONREACTIVE
HBV SURFACE AB SER QL: NONREACTIVE
HBV SURFACE AG SER QL: NONREACTIVE
HCT VFR BLD CALC: 40.3 %
HCV AB SER QL: NONREACTIVE
HCV S/CO RATIO: 0.15 S/CO
HDLC SERPL-MCNC: 43 MG/DL
HGB BLD-MCNC: 12.8 G/DL
IMM GRANULOCYTES NFR BLD AUTO: 0.5 %
LDLC SERPL CALC-MCNC: 78 MG/DL
LYMPHOCYTES # BLD AUTO: 1.82 K/UL
LYMPHOCYTES NFR BLD AUTO: 28.1 %
MAN DIFF?: NORMAL
MCHC RBC-ENTMCNC: 31.4 PG
MCHC RBC-ENTMCNC: 31.8 GM/DL
MCV RBC AUTO: 98.8 FL
MONOCYTES # BLD AUTO: 0.8 K/UL
MONOCYTES NFR BLD AUTO: 12.4 %
NEUTROPHILS # BLD AUTO: 3.72 K/UL
NEUTROPHILS NFR BLD AUTO: 57.4 %
NONHDLC SERPL-MCNC: 92 MG/DL
PLATELET # BLD AUTO: 136 K/UL
POTASSIUM SERPL-SCNC: 4.3 MMOL/L
PROT SERPL-MCNC: 7.2 G/DL
RBC # BLD: 4.08 M/UL
RBC # FLD: 14.6 %
SODIUM SERPL-SCNC: 141 MMOL/L
T3RU NFR SERPL: 1.1 TBI
T4 SERPL-MCNC: 5.7 UG/DL
TRIGL SERPL-MCNC: 71 MG/DL
TSH SERPL-ACNC: 1.91 UIU/ML
URATE SERPL-MCNC: 4.1 MG/DL
VIT B12 SERPL-MCNC: 673 PG/ML
WBC # FLD AUTO: 6.47 K/UL

## 2022-11-18 ENCOUNTER — APPOINTMENT (OUTPATIENT)
Dept: ENDOCRINOLOGY | Facility: CLINIC | Age: 77
End: 2022-11-18

## 2022-12-08 ENCOUNTER — APPOINTMENT (OUTPATIENT)
Dept: OTOLARYNGOLOGY | Facility: CLINIC | Age: 77
End: 2022-12-08

## 2022-12-08 DIAGNOSIS — H90.3 SENSORINEURAL HEARING LOSS, BILATERAL: ICD-10-CM

## 2022-12-08 DIAGNOSIS — H61.23 IMPACTED CERUMEN, BILATERAL: ICD-10-CM

## 2022-12-08 PROCEDURE — 99213 OFFICE O/P EST LOW 20 MIN: CPT | Mod: 25

## 2022-12-08 PROCEDURE — 69210 REMOVE IMPACTED EAR WAX UNI: CPT

## 2022-12-08 NOTE — ASSESSMENT
[FreeTextEntry1] : Ms. NEWSOME is a 77 year female with LPRD and SNHL now with new HA  which she has had difficulty using. On exam she has small ear canals and bilateral cerumen L>R.\par \par - cerumen removed today\par - f/u 6 months\par \par \par

## 2022-12-08 NOTE — HISTORY OF PRESENT ILLNESS
[de-identified] : Ms. NEWSOME is a 76 year female c/o sharp right ear pain x 2 months. denies otorrhea, tinnitus, vertigo and bilateral hearing loss. + sore throat dysphagia on right side x 2 months as well, it comes and goes worse at night, no smoking history\par - s/p PPI for throat pain\par - s/p cerumen removal in 4/2022\par \par \par  [FreeTextEntry1] : today with muffled hearing no pain or discharge, she has new HA which have been problematic and was told to have an ear check

## 2023-02-09 RX ORDER — PEN NEEDLE, DIABETIC 29 G X1/2"
32G X 4 MM NEEDLE, DISPOSABLE MISCELLANEOUS
Qty: 4 | Refills: 3 | Status: ACTIVE | COMMUNITY
Start: 2017-01-09 | End: 1900-01-01

## 2023-02-10 ENCOUNTER — RX RENEWAL (OUTPATIENT)
Age: 78
End: 2023-02-10

## 2023-02-27 ENCOUNTER — APPOINTMENT (OUTPATIENT)
Dept: ENDOCRINOLOGY | Facility: CLINIC | Age: 78
End: 2023-02-27
Payer: MEDICARE

## 2023-02-27 VITALS
BODY MASS INDEX: 29.84 KG/M2 | OXYGEN SATURATION: 97 % | HEIGHT: 60 IN | WEIGHT: 152 LBS | DIASTOLIC BLOOD PRESSURE: 68 MMHG | SYSTOLIC BLOOD PRESSURE: 110 MMHG | HEART RATE: 84 BPM

## 2023-02-27 DIAGNOSIS — Z13.820 ENCOUNTER FOR SCREENING FOR OSTEOPOROSIS: ICD-10-CM

## 2023-02-27 PROCEDURE — 99214 OFFICE O/P EST MOD 30 MIN: CPT

## 2023-02-27 NOTE — PHYSICAL EXAM
[Alert] : alert [No Acute Distress] : no acute distress [No Neck Mass] : no neck mass was observed [No LAD] : no lymphadenopathy [Thyroid Not Enlarged] : the thyroid was not enlarged [No Thyroid Nodules] : no palpable thyroid nodules [No Respiratory Distress] : no respiratory distress [Clear to Auscultation] : lungs were clear to auscultation bilaterally [Normal S1, S2] : normal S1 and S2 [Normal Rate] : heart rate was normal [Not Tender] : non-tender [Soft] : abdomen soft [No Stigmata of Cushings Syndrome] : no stigmata of Cushings Syndrome [Oriented x3] : oriented to person, place, and time

## 2023-02-27 NOTE — ASSESSMENT
[FreeTextEntry1] : Patient is a 77-year-old woman with history of type 2 diabetes, CHF, CAD, AICD, here to follow-up for type 2 diabetes.\par \par 1. Type 2 diabetes\par A1c 6.5% \par Checking glucose twice daily. Fasting numbers good, prandial numbers low\par -Decrease Levemir to 16 units qHS (from 18 units)\par -Decrease Novolog to 5 untis qAC (from 7 units)\par -Resume Jardiance 10 mg daily (patient self d/c due to hypoglycemia. Discussed that hypoglycemia was likely from high insulin doses). Cardiac and renal benefit\par \par Ophthalmology: March 2022 no retinopathy. Needs follow up\par Podiatry: Foot exam with monofilament today within normal limits. \par \par 2. Hypertension\par Blood pressure in control.\par Continue to follow with cardiology.\par eGFR 43 mL/min/1.73M2 (11/2022)\par Creatinine 1.28 (11/2022)\par Microalbumin/creatinine ratio unable to calculate Mar 2022\par We will repeat today\par BP medication:\par Metoprolol 100 mg once daily\par Losartan 50 mg once daily\par Spironolactone 25 mg once daily\par Resuming Jardiance 10 mg daily\par \par 3. Hyperlipidemia\par LDL 78 mg/dL (11/2022)\par Patient is currently on Zetia 10 mg once daily\par Rosuvastatin 40 mg once daily\par \par 4. Osteoporosis screening\par BMD Aug 2022 \par L1 - L2 -1.2\par Total hip -0.8\par 1/3 Forearm -0.2\par FRAX 3.9%, hip fx risk 0.5%\par \par DEXA due Aug 2024\par \par F/u 6 months\par \par Discussed with Dr. Foster\par \par \par Yudith Mejias MD\par Endocrine Fellow\par \par Diabetes Counseling: The patient was counseled on diabetes foot care, long term vascular complications of diabetes, carbohydrate consistent diet, importance of diet and exercise to improve glycemic control, achieve weight loss and improve cardiovascular health, exercise/effect on glucose, hypoglycemia management, glucagon use, ketone testing, action and use of short and long-acting insulin, self monitoring of blood glucose, insulin self-administration, injection technique, storage, and sharps disposal and sick-day management. Patient was referred to ophthalmology for retinopathy screening. \par

## 2023-02-27 NOTE — HISTORY OF PRESENT ILLNESS
[FreeTextEntry1] : This is a 77 year old woman w/ DM2, CHF, CAD, + AICD, here for f/u visit for DM2.\par Pt. very poor historian. \par \par She was seen by CDE 8/2016 for a post hospitalization visit. She had had hospital admission for chest pain, dizziness, and LE weakness. She had elevated BG at home and went to ED. A1c on admission = 13.4%.Cr = 1.94 but improved to 1.02 by d/c. Glucose on admission 900. Pt reports prior to hospital she was taking Lantus 10 daily and Humalog 5/7/7. She was d/c on BASAL/BOLUS insulin.\par \par Currently on: Levemir 18 units qHS, Novolog 7 units qAC\par Started on Jardiance 10 mg daily at last visit, patient self discontinued due to hypoglycemia\par \par Checking glucose twice daily. Meter download shows AM glucoses 80-110s, prelunch and bedtime lows to 59\par Eating well, balanced meals\par \par March 2022 ophtho - no retinopathy, she had cataract that's removed and she has a history of diabetic retinopathy. Dr. Ramirez \par She hasn't been followed with foot doctor yet. \par Alb/Cr Aug 2022 wnl\par \par DEXA 2022 WNL.\par \par Abd US done 6/2020 showed simple renal cyst.\par \par History of hypertension, managed by cardiology. History of CHF, also with cardiology follow-up. Now on Jardiance\par \par History of hyperlipidemia, currently on Zetia 10 mg daily and Rosuvastatin 40 mg daily\par

## 2023-02-27 NOTE — END OF VISIT
[] : Fellow [FreeTextEntry3] : Patient is a veronica 77-year-old woman with history of type 2 diabetes, CHF, CKD with EGFR in 40s range, here for endocrinology follow-up.  Patient is currently treated with basal bolus insulin regimen of Levemir 18 units at bedtime, NovoLog 7 units 3 times daily with meals.  Patient was prescribed Jardiance 10 mg once daily in the past secondary to a history of CHF as well as CKD.  However patient discontinued Jardiance on her home due to recurrent hypoglycemia at home.\par Review of the glucose download is still notable for hyperglycemia postprandially, as well as tightly controlled fasting glucose.\par Therefore we will recommend reducing Levemir to 16 units from 18 units at bedtime.  Recommend reducing NovoLog from 7 units to 5 units before every meal.  Recommend patient to restart Jardiance 10 mg once daily.  New prescription sent to pharmacy.\par Regarding hypertension, continue with her current regimen as per cardiology/CHF team.\par Regarding hyperlipidemia, continue with rosuvastatin 40 mg once daily.\par Patient is due for bone mineral density screening in August 2024.  She is noted to have mild osteopenia, with a T score of -1.2 in the spine, in August 2022.

## 2023-02-28 ENCOUNTER — APPOINTMENT (OUTPATIENT)
Dept: ENDOCRINOLOGY | Facility: CLINIC | Age: 78
End: 2023-02-28

## 2023-03-06 ENCOUNTER — RX RENEWAL (OUTPATIENT)
Age: 78
End: 2023-03-06

## 2023-03-10 ENCOUNTER — APPOINTMENT (OUTPATIENT)
Dept: HEART FAILURE | Facility: CLINIC | Age: 78
End: 2023-03-10

## 2023-03-17 ENCOUNTER — APPOINTMENT (OUTPATIENT)
Dept: INTERNAL MEDICINE | Facility: CLINIC | Age: 78
End: 2023-03-17
Payer: MEDICARE

## 2023-03-17 ENCOUNTER — NON-APPOINTMENT (OUTPATIENT)
Age: 78
End: 2023-03-17

## 2023-03-17 VITALS — BODY MASS INDEX: 31.22 KG/M2 | WEIGHT: 159 LBS | HEIGHT: 60 IN

## 2023-03-17 VITALS — DIASTOLIC BLOOD PRESSURE: 70 MMHG | SYSTOLIC BLOOD PRESSURE: 120 MMHG

## 2023-03-17 DIAGNOSIS — E11.42 TYPE 2 DIABETES MELLITUS WITH DIABETIC POLYNEUROPATHY: ICD-10-CM

## 2023-03-17 PROCEDURE — 99214 OFFICE O/P EST MOD 30 MIN: CPT | Mod: 25

## 2023-03-17 PROCEDURE — 36415 COLL VENOUS BLD VENIPUNCTURE: CPT

## 2023-03-17 PROCEDURE — 93000 ELECTROCARDIOGRAM COMPLETE: CPT | Mod: 59

## 2023-03-17 NOTE — HISTORY OF PRESENT ILLNESS
[de-identified] : This is a 77 year-old patient with a history of ischemic cardiomyopathy requiring the placement of a defibrillator pacemaker who also has episodes of congestive failure.  In addition the patient has history of diabetes mellitus complicated by diabetic polyneuropathy and mild renal insufficiency.  In addition on last visit her A1c was 9.2.

## 2023-03-17 NOTE — ASSESSMENT
[FreeTextEntry1] : Problems\par Congestive cardiomyopathy\par CHF\par Pacemaker defibrillator\par Diabetes mellitus\par Diabetic neuropathy\par Ischemic cardiomyopathy\par Hypercholesterolemia\par Hypertension \par Assessment\par The patient feels well from a cardiological standpoint.  She denies any resting chest pain or shortness of breath.  In addition she denies any pedal edema.  I spoke to the patient about her uncontrolled diabetes and she admits that she has not seen the endocrinologist as of yet.  I reviewed diet with her both low-sodium low-cholesterol.  Her blood pressure was well well controlled at 120/80 her physical examination did not reveal any presence of neck veins rales or peripheral edema.  And she is scheduled to see her cardiologist .  In addition the patient did see her endocrinologist who doubled the dose of her insulin therapy and started her on Jardiance which I agree with as Jardiance can significantly lower her progression of diabetes to chronic renal failure\par

## 2023-03-20 LAB
ALBUMIN SERPL ELPH-MCNC: 4.2 G/DL
ALP BLD-CCNC: 79 U/L
ALT SERPL-CCNC: 53 U/L
ANION GAP SERPL CALC-SCNC: 11 MMOL/L
AST SERPL-CCNC: 61 U/L
BASOPHILS # BLD AUTO: 0.06 K/UL
BASOPHILS NFR BLD AUTO: 0.9 %
BILIRUB SERPL-MCNC: 0.5 MG/DL
BUN SERPL-MCNC: 25 MG/DL
CALCIUM SERPL-MCNC: 10 MG/DL
CHLORIDE SERPL-SCNC: 105 MMOL/L
CO2 SERPL-SCNC: 24 MMOL/L
CREAT SERPL-MCNC: 1.19 MG/DL
EGFR: 47 ML/MIN/1.73M2
EOSINOPHIL # BLD AUTO: 0.04 K/UL
EOSINOPHIL NFR BLD AUTO: 0.6 %
ESTIMATED AVERAGE GLUCOSE: 163 MG/DL
GLUCOSE SERPL-MCNC: 154 MG/DL
HBA1C MFR BLD HPLC: 7.3 %
HCT VFR BLD CALC: 41.8 %
HGB BLD-MCNC: 13.5 G/DL
IMM GRANULOCYTES NFR BLD AUTO: 0.1 %
LYMPHOCYTES # BLD AUTO: 1.46 K/UL
LYMPHOCYTES NFR BLD AUTO: 21.2 %
MAN DIFF?: NORMAL
MCHC RBC-ENTMCNC: 31.5 PG
MCHC RBC-ENTMCNC: 32.3 GM/DL
MCV RBC AUTO: 97.4 FL
MONOCYTES # BLD AUTO: 0.65 K/UL
MONOCYTES NFR BLD AUTO: 9.4 %
NEUTROPHILS # BLD AUTO: 4.67 K/UL
NEUTROPHILS NFR BLD AUTO: 67.8 %
PLATELET # BLD AUTO: 128 K/UL
POTASSIUM SERPL-SCNC: 4.2 MMOL/L
PROT SERPL-MCNC: 7.1 G/DL
RBC # BLD: 4.29 M/UL
RBC # FLD: 13.8 %
SODIUM SERPL-SCNC: 140 MMOL/L
WBC # FLD AUTO: 6.89 K/UL

## 2023-03-29 ENCOUNTER — APPOINTMENT (OUTPATIENT)
Dept: HEART FAILURE | Facility: CLINIC | Age: 78
End: 2023-03-29
Payer: MEDICARE

## 2023-03-29 VITALS — WEIGHT: 151 LBS | BODY MASS INDEX: 29.49 KG/M2

## 2023-03-29 VITALS
OXYGEN SATURATION: 95 % | SYSTOLIC BLOOD PRESSURE: 115 MMHG | DIASTOLIC BLOOD PRESSURE: 73 MMHG | HEIGHT: 62 IN | BODY MASS INDEX: 27.62 KG/M2 | HEART RATE: 79 BPM

## 2023-03-29 DIAGNOSIS — M79.671 PAIN IN RIGHT FOOT: ICD-10-CM

## 2023-03-29 PROCEDURE — 93000 ELECTROCARDIOGRAM COMPLETE: CPT

## 2023-03-29 PROCEDURE — 99214 OFFICE O/P EST MOD 30 MIN: CPT | Mod: 25

## 2023-03-29 RX ORDER — CALCIUM CARB/VIT D2/MINERALS
TABLET ORAL
Qty: 90 | Refills: 1 | Status: DISCONTINUED | COMMUNITY
Start: 2020-06-09 | End: 2023-03-29

## 2023-03-29 RX ORDER — PANTOPRAZOLE 40 MG/1
40 TABLET, DELAYED RELEASE ORAL
Qty: 30 | Refills: 0 | Status: DISCONTINUED | COMMUNITY
Start: 2022-04-13 | End: 2023-03-29

## 2023-03-29 RX ORDER — CHOLECALCIFEROL (VITAMIN D3) 25 MCG
25 MCG CAPSULE ORAL
Qty: 90 | Refills: 0 | Status: DISCONTINUED | COMMUNITY
Start: 2021-06-23 | End: 2023-03-29

## 2023-03-29 RX ORDER — UBIDECARENONE/VIT E ACET 100MG-5
25 MCG CAPSULE ORAL
Qty: 90 | Refills: 1 | Status: DISCONTINUED | COMMUNITY
Start: 2020-06-09 | End: 2023-03-29

## 2023-03-29 RX ORDER — CHOLECALCIFEROL (VITAMIN D3) 25 MCG
25 TABLET ORAL
Qty: 90 | Refills: 1 | Status: DISCONTINUED | COMMUNITY
Start: 2022-03-29 | End: 2023-03-29

## 2023-03-29 RX ORDER — ACETAMINOPHEN AND CODEINE PHOSPHATE 300; 15 MG/1; MG/1
300-15 TABLET ORAL TWICE DAILY
Qty: 60 | Refills: 0 | Status: DISCONTINUED | COMMUNITY
Start: 2020-09-15 | End: 2023-03-29

## 2023-03-31 NOTE — ASSESSMENT
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 Currently appears euvolemic and compensated. Chest pain appears to be musculoskeletal and non-cardiac but will follow closely given her prior history. Expressed to the daughter to let us know if worsens. \par \par 1. HFrEF - resolved; s/p BiV ICD with ICD 70%\par - c/w lasix prn; pt has not taken recently due to dizziness and low B/p at times\par - continue losartan 50 mg daily\par - continue with price 25 mg daily\par - previously on toprol  mg daily but had stopped it without being aware; will resume toprol at 25 mg daily \par - will repeat TTE\par \par 2. CAD - stress test w/o ischemia 2018; non-cardiac pain as reproducible \par - last PCI 2016; on ASA 81 mg daily\par - on crestor 40 mg qhs and zetia 10 mg daily; lipid panel 11/22 at goal \par - continue toprol as above\par \par 3. Leg fatigue - ? claudication\par - will evaluate with ABIs and arterial dopplers\par \par RTC 6 months with NP, then me in 1 year

## 2023-03-31 NOTE — PHYSICAL EXAM
[No Acute Distress] : no acute distress [Normal Conjunctiva] : normal conjunctiva [Normal S1, S2] : normal S1, S2 [Clear Lung Fields] : clear lung fields [Good Air Entry] : good air entry [Soft] : abdomen soft [No Rash] : no rash [Normal] : alert and oriented, normal memory [de-identified] : elderly female [de-identified] : JVP 6 cm  [de-identified] : Left chest CRT-D; tender to palpation along R pectoral region  [de-identified] : no lower extremity edema bilaterally [de-identified] : forgetful, unsteady on her feet

## 2023-03-31 NOTE — HISTORY OF PRESENT ILLNESS
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 Accompanied by daughter, Sanna. \par \par Since last seen 1/2022, had been doing well. Hasn't had any hospitalizations or ER visits. Reports last week daughter noted her BP was low in 90s so held her cardiac medications. Reports some dyspnea during the day with occasional chest tightness in the evening, 9/10 worse with palpation. Improves with Tylenol. Unclear if related to exertion. Denies any radiation. \par \par States home weight is stable in the range of 150-152 lbs. Pt states she lives at home with her daughter Sanna and her family and lives on the first floor. She sleeps with 2 pillows with no orthopnea. Denies diaphoresis and no syncope.  \par \par Reports ET is good but has some leg fatigue with exertion. \par \par Takes all medications and follows low sodium/fluid restriction. Denies palpitations/ICD shocks. Reports BPs at times are in 90s-100. \par \par Has not been hospitalized over past year. Denies fevers/chills. \par \par Pt had Covid vaccine x 2 including booster without adverse reaction.

## 2023-03-31 NOTE — CARDIOLOGY SUMMARY
[de-identified] : \par 3/29/23 - A sensed, BiV paced\par 1/14/22 A-sensed, BiV paced\par 7/28/21 Atrial sensed with biventricular paced rhythm \par 1/27/21 - A-sensed, BiV paced\par  [de-identified] : \par 6/28/18 - pharmacologic nuclear stress - no ischemia\par   \par 6/15/17 - nuclear pharmacologic stress - EF 53%, small fixed apical inferoseptal pefusion defect (? artifact); no ischemia\par \par  [de-identified] : \par 7/15/22 - normal biventricular function\par \par 3/19 - normal LV function and nl RV function \par \par 6/28/18 - EF 70%, small LV cavity, nl RV function\par \par 3/19 - normal LV function and nl RV function \par \par 6/28/18 - EF 70%, small LV cavity, nl RV function\par \par 3/19 - normal LV function and nl RV function \par \par 6/28/18 - EF 70%, small LV cavity, nl RV function\par \par 6/28/18 - EF 70%, small LV cavity, nl RV function\par \par \par  [de-identified] : \par 4/4/16 ([facility]) - pLAD patent stent, dLAD 20% stenosis prior to stent, prox LCx 85% stenosis, distal LCx 75-90% stenosis (small sized), 1st OM 75% stenosis (large sized) s/p PCI, RCA patent, RPDA patent stent, RPL patent stent; EF 70%

## 2023-04-26 ENCOUNTER — APPOINTMENT (OUTPATIENT)
Dept: CV DIAGNOSITCS | Facility: HOSPITAL | Age: 78
End: 2023-04-26

## 2023-04-26 ENCOUNTER — OUTPATIENT (OUTPATIENT)
Dept: OUTPATIENT SERVICES | Facility: HOSPITAL | Age: 78
LOS: 1 days | End: 2023-04-26
Payer: MEDICARE

## 2023-04-26 DIAGNOSIS — Z95.810 PRESENCE OF AUTOMATIC (IMPLANTABLE) CARDIAC DEFIBRILLATOR: Chronic | ICD-10-CM

## 2023-04-26 DIAGNOSIS — Z95.5 PRESENCE OF CORONARY ANGIOPLASTY IMPLANT AND GRAFT: Chronic | ICD-10-CM

## 2023-04-26 DIAGNOSIS — Z95.0 PRESENCE OF CARDIAC PACEMAKER: Chronic | ICD-10-CM

## 2023-04-26 DIAGNOSIS — M79.671 PAIN IN RIGHT FOOT: ICD-10-CM

## 2023-04-26 PROCEDURE — 93925 LOWER EXTREMITY STUDY: CPT | Mod: 26

## 2023-05-03 NOTE — ED ADULT TRIAGE NOTE - IDEAL BODY WEIGHT(KG)
[facility] [place]  112/01  HOSPITAL MEDICINE PROGRESS NOTE   Patient: Tiffany Sears  Today's Date: 5/3/2023    YOB: 1941  Admission Date: 5/1/2023    MRN: [id number]  Inpatient LOS: 2    Attending: Aj Randle DO  Hospital Day: Hospital Day: 3    Subjective   HISTORY AND SUBJECTIVE COMPLAINTS     Chief Complaint:   Generalized weakness    Interval History / Subjective:   Patient did well overnight.  No fevers or chills.  Quite spry this morning.   and son are present in the room.    Hospital Course:  Tiffany Sears is a 81 year old female who presented on 5/1/2023 with complaints of Low Blood Sugar Symptomatic  .    ROS:  Pertinent systems negative except as above.    Objective   PHYSICAL EXAMINATION     Vital 24 Hour Range Most Recent Value   Temperature Temp  Min: 97.3 °F (36.3 °C)  Max: 97.6 °F (36.4 °C) 97.3 °F (36.3 °C)   Pulse Pulse  Min: 51  Max: 149 (!) 59   Respiratory Resp  Min: 17  Max: 42 17   Blood Pressure BP  Min: 129/62  Max: 172/72 (!) 145/67   Pulse Oximetry SpO2  Min: 96 %  Max: 99 % 98 %   Arterial BP No data recorded     O2 No data recorded       Recorded Intake and Output:    Intake/Output Summary (Last 24 hours) at 5/3/2023 0837  Last data filed at 5/3/2023 0320  Gross per 24 hour   Intake 120 ml   Output 350 ml   Net -230 ml      Recorded Last Stool Occurrence:       Vital Most Recent Value First Value   Weight 90.6 kg (199 lb 11.8 oz) Weight: 90.6 kg (199 lb 11.8 oz)   Height 5' 4\" (162.6 cm) Height: 5' 4\" (162.6 cm)   BMI   N/A     General: Looks chronically ill no apparent distress  CV: Bradycardic  Resp: clear to auscultation bilaterally  Abd: soft, nontender, nondistended and no hepatosplenomegaly  Ext:  Ulcerations to lower extremity is clean and dressed  Neuro: CN 2-12 grossly intact  Psych: normal judgement and insight    TEST RESULTS     Labs: The Laboratory values listed below have been reviewed and pertinent findings discussed in the  Assessment and Plan.    Laboratory values:   Recent Labs   Lab 05/02/23  0424 05/01/23  0948   WBC 9.9 8.5   HGB 10.2* 11.4*   HCT 33.9* 37.4    294       Recent Labs   Lab 05/03/23  0502 05/02/23  1447 05/02/23  0424 05/01/23  0948   SODIUM 141  --  139 142   POTASSIUM 4.3  --  4.2 4.2   CHLORIDE 108  --  108 109   CO2 26  --  25 26   CALCIUM 9.5  --  8.8 9.4   GLUCOSE 117*  --  272* 88   BUN 28*  --  34* 39*   CREATININE 1.10*  --  1.09* 1.25*   MG 1.8 2.1 1.5* 1.7        Recent Labs   Lab 05/01/23  0948   ALBUMIN 2.3*   AST 26   GPT 19   BILIRUBIN 0.5       Radiology: Imaging studies have been reviewed and pertinent findings discussed in the Assessment and Plan.  Results for orders placed or performed during the hospital encounter of 05/01/23 (from the past 48 hour(s))   CT HEAD WO CONTRAST    Impression    IMPRESSION:   1. No acute intracranial pathology identified.    2. Diffuse cortical atrophy. Changes of chronic small vessel ischemic  disease.           XR Chest AP or PA    Impression    IMPRESSION: Left-sided pacemaker with leads in stable position. Sternotomy  wires.    Hiatal hernia with adjacent atelectasis in the retrocardiac regions.  Otherwise clear lungs. No effusion.    Mild cardiomegaly.     No pneumothorax.               ANCILLARY ORDERS     Diet:  Cardiac, Consistent Carb Moderate (45-75 Gm/meal) Diet  Daily - Pm Snack; Magic Cup Dessert/high Protein Pudding, Chocolate Oral Nutrition Supplement  Telemetry: On  Consults:    IP CONSULT TO PHARMACY  IP CONSULT TO INFECTIOUS DISEASES  IP CONSULT TO ELECTROPHYSIOLOGY  IP CONSULT TO RN WOUND CARE  IP CONSULT TO NUTRITION SERVICES  IP CONSULT TO ELECTROPHYSIOLOGY  IP CONSULT TO WOUND CARE MEDICAL PROVIDER  IP CONSULT TO NUTRITION SERVICES  Therapy Orders:   PT and OT Orders Placed this Encounter   Procedures   • Occupational Therapy   • Physical Therapy       ADVANCED DIRECTIVES     Code Status: Full Resuscitation             ASSESSMENT AND PLAN      Pacemaker with underlying atrial fibrillation now with complete heart block and supratherapeutic INR   -pacemaker past end of life and will need replacement   -EP evaluating and planning on replacing on 05/07   -currently in junctional rhythm with heart rate in the 50s and asymptomatic   -Coumadin currently on hold.     -INR 8.5 on admission down to 1.4 today after receiving vitamin K   -I would like to place the patient on heparin.  Will ask wound care if they feel this would be appropriate    Acute urinary tract infection with E. 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    Hx Temporal arteritis/PMR   -Not currently on steroids    Smoking status: Not a Tobacco User    Nutrition status: appropriate  Body mass index is 34.28 kg/m². - Patient is obese with BMI 30-40  DVT Prophylaxis:  Will start DVT prophylaxis with Lovenox         DISCHARGE PLANNING     The patient's treatment plans were discussed with patient and RN.    Discharge Planning     Barriers to discharge: Patient is not medically ready and needs to remain in the hospital today due to For further therapy  Anticipated discharge destination: Home  Expected Discharge Date: TBD                Aj Randle,  53 DO  Hospitalist  5/3/2023  8:37 AM

## 2023-05-05 ENCOUNTER — NON-APPOINTMENT (OUTPATIENT)
Age: 78
End: 2023-05-05

## 2023-05-25 LAB
ALBUMIN SERPL ELPH-MCNC: 4.1 G/DL
ALP BLD-CCNC: 89 U/L
ALT SERPL-CCNC: 90 U/L
ANION GAP SERPL CALC-SCNC: 10 MMOL/L
AST SERPL-CCNC: 87 U/L
BILIRUB SERPL-MCNC: 0.5 MG/DL
BUN SERPL-MCNC: 24 MG/DL
CALCIUM SERPL-MCNC: 10.1 MG/DL
CHLORIDE SERPL-SCNC: 106 MMOL/L
CHOLEST SERPL-MCNC: 143 MG/DL
CO2 SERPL-SCNC: 24 MMOL/L
CREAT SERPL-MCNC: 1.09 MG/DL
EGFR: 52 ML/MIN/1.73M2
ESTIMATED AVERAGE GLUCOSE: 157 MG/DL
GLUCOSE SERPL-MCNC: 133 MG/DL
HBA1C MFR BLD HPLC: 7.1 %
HDLC SERPL-MCNC: 45 MG/DL
LDLC SERPL CALC-MCNC: 81 MG/DL
NONHDLC SERPL-MCNC: 98 MG/DL
NT-PROBNP SERPL-MCNC: 122 PG/ML
POTASSIUM SERPL-SCNC: 4.8 MMOL/L
PROT SERPL-MCNC: 7.4 G/DL
SODIUM SERPL-SCNC: 140 MMOL/L
TRIGL SERPL-MCNC: 84 MG/DL

## 2023-05-26 ENCOUNTER — APPOINTMENT (OUTPATIENT)
Dept: CV DIAGNOSITCS | Facility: HOSPITAL | Age: 78
End: 2023-05-26
Payer: MEDICARE

## 2023-05-26 ENCOUNTER — OUTPATIENT (OUTPATIENT)
Dept: OUTPATIENT SERVICES | Facility: HOSPITAL | Age: 78
LOS: 1 days | End: 2023-05-26

## 2023-05-26 DIAGNOSIS — I50.22 CHRONIC SYSTOLIC (CONGESTIVE) HEART FAILURE: ICD-10-CM

## 2023-05-26 DIAGNOSIS — Z95.810 PRESENCE OF AUTOMATIC (IMPLANTABLE) CARDIAC DEFIBRILLATOR: Chronic | ICD-10-CM

## 2023-05-26 DIAGNOSIS — Z95.0 PRESENCE OF CARDIAC PACEMAKER: Chronic | ICD-10-CM

## 2023-05-26 DIAGNOSIS — Z95.5 PRESENCE OF CORONARY ANGIOPLASTY IMPLANT AND GRAFT: Chronic | ICD-10-CM

## 2023-05-26 PROCEDURE — 93306 TTE W/DOPPLER COMPLETE: CPT | Mod: 26

## 2023-05-30 ENCOUNTER — APPOINTMENT (OUTPATIENT)
Dept: ENDOCRINOLOGY | Facility: CLINIC | Age: 78
End: 2023-05-30

## 2023-06-07 LAB
ALBUMIN SERPL ELPH-MCNC: 4.1 G/DL
ALP BLD-CCNC: 87 U/L
ALT SERPL-CCNC: 45 U/L
ANION GAP SERPL CALC-SCNC: 12 MMOL/L
AST SERPL-CCNC: 38 U/L
BILIRUB SERPL-MCNC: 0.5 MG/DL
BUN SERPL-MCNC: 28 MG/DL
CALCIUM SERPL-MCNC: 10.3 MG/DL
CHLORIDE SERPL-SCNC: 106 MMOL/L
CO2 SERPL-SCNC: 24 MMOL/L
CREAT SERPL-MCNC: 1.02 MG/DL
EGFR: 56 ML/MIN/1.73M2
GLUCOSE SERPL-MCNC: 101 MG/DL
POTASSIUM SERPL-SCNC: 4.7 MMOL/L
PROT SERPL-MCNC: 7.3 G/DL
SODIUM SERPL-SCNC: 141 MMOL/L

## 2023-06-16 ENCOUNTER — APPOINTMENT (OUTPATIENT)
Dept: INTERNAL MEDICINE | Facility: CLINIC | Age: 78
End: 2023-06-16

## 2023-07-10 ENCOUNTER — RX RENEWAL (OUTPATIENT)
Age: 78
End: 2023-07-10

## 2023-07-20 ENCOUNTER — APPOINTMENT (OUTPATIENT)
Dept: ELECTROPHYSIOLOGY | Facility: CLINIC | Age: 78
End: 2023-07-20
Payer: MEDICARE

## 2023-07-20 ENCOUNTER — NON-APPOINTMENT (OUTPATIENT)
Age: 78
End: 2023-07-20

## 2023-07-20 PROCEDURE — 93295 DEV INTERROG REMOTE 1/2/MLT: CPT

## 2023-07-20 PROCEDURE — 93296 REM INTERROG EVL PM/IDS: CPT

## 2023-07-24 ENCOUNTER — APPOINTMENT (OUTPATIENT)
Dept: INTERNAL MEDICINE | Facility: CLINIC | Age: 78
End: 2023-07-24
Payer: MEDICARE

## 2023-07-24 VITALS
WEIGHT: 148 LBS | BODY MASS INDEX: 27.23 KG/M2 | HEART RATE: 68 BPM | DIASTOLIC BLOOD PRESSURE: 60 MMHG | HEIGHT: 62 IN | OXYGEN SATURATION: 98 % | RESPIRATION RATE: 16 BRPM | SYSTOLIC BLOOD PRESSURE: 118 MMHG

## 2023-07-24 DIAGNOSIS — M16.10 UNILATERAL PRIMARY OSTEOARTHRITIS, UNSPECIFIED HIP: ICD-10-CM

## 2023-07-24 DIAGNOSIS — Z82.3 FAMILY HISTORY OF STROKE: ICD-10-CM

## 2023-07-24 PROCEDURE — 99214 OFFICE O/P EST MOD 30 MIN: CPT | Mod: 25

## 2023-07-24 PROCEDURE — 36415 COLL VENOUS BLD VENIPUNCTURE: CPT

## 2023-07-24 NOTE — HISTORY OF PRESENT ILLNESS
[FreeTextEntry1] : mult med issues\par DM, diabetic neuropathy, renal insuf, hld, ischemiac Cm, CHF, s/p PPM/ICD, GERD/? gastroparesis, PAD, hypercalcemia, hyperparathyroid [de-identified] : Chart reviewed.  \par \par Pt is a 77 y/o female with a hx of DM, diabetic neuropathy, renal insuf, hld, htn, ischemiac Cm, CHF, s/p PPM/ICD, GERD/? gastroparesis, PAD, hypercalcemia, hyperparathyroid\par Here to establish with new PCP.\par Following with cardiology and endocrinology.  Has f/u appointments 9/23\par Saw GI x1 in '22\par Has been taking meds w/o issues.  Getting DM meds adjusted with endocrinology.  REcently had crestor reduced by cardiology due to inc liver enzymes.\par diet - reported as healthy.  Walking for exercise\par FS reported as 161 today.  \par Denies any chest pains or palpitations.  + occ dyspnea when walking or when talking.  + some fatigue after walking.  Sleeps on 3 pillows.  Reprots dizziness x1 2 mo ago.  \par Reports some nausea all the time.  no vomiting.  + some epigastric pains.  no diarrhea.  + constipation.  \par + some urinary freq and nocturia.  \par + numbness at the feet.  no noted focal weakness.  + gen weakness.  \par no rash.\par + occ dec hearing.  \par + occ knee pains and hip pains.  \par Daughter reports that the pt has been having forgetfulness.  \par \par echo 5/23 - EF 59%, diastolic dysfunction, LAE, borderline pulm HTN\par dexa 8/22 - for f/u 8/24\par ophtho - on chart 3/22\par \par Vaccines - pneumo '18 and '19\par gets flu vaccine

## 2023-07-24 NOTE — HEALTH RISK ASSESSMENT
[No] : In the past 12 months have you used drugs other than those required for medical reasons? No [No falls in past year] : Patient reported no falls in the past year [0] : 1) Little interest or pleasure doing things: Not at all (0) [1] : 2) Feeling down, depressed, or hopeless for several days (1) [PHQ-2 Negative - No further assessment needed] : PHQ-2 Negative - No further assessment needed [Change in mental status noted] : Change in mental status noted [None] : None [With Family] : lives with family [Retired] : retired [Fully functional (bathing, dressing, toileting, transferring, walking, feeding)] : Fully functional (bathing, dressing, toileting, transferring, walking, feeding) [Never] : Never [Audit-CScore] : 0 [[id number]] : 1 [de-identified] : gets help with shopping, cooking frequently.

## 2023-07-24 NOTE — REVIEW OF SYSTEMS
[Fatigue] : fatigue [Hearing Loss] : hearing loss [Dyspnea on Exertion] : dyspnea on exertion [Abdominal Pain] : abdominal pain [Nausea] : nausea [Constipation] : constipation [Nocturia] : nocturia [Frequency] : frequency [Joint Pain] : joint pain [Muscle Weakness] : muscle weakness [Memory Loss] : memory loss [Unsteady Walking] : ataxia [Negative] : Heme/Lymph [Fever] : no fever [Chills] : no chills [Hot Flashes] : no hot flashes [Night Sweats] : no night sweats [Recent Change In Weight] : ~T no recent weight change [Shortness Of Breath] : no shortness of breath [Wheezing] : no wheezing [Cough] : no cough [Diarrhea] : diarrhea [Vomiting] : no vomiting [Heartburn] : no heartburn [Melena] : no melena [Joint Stiffness] : no joint stiffness [Joint Swelling] : no joint swelling [Muscle Pain] : no muscle pain [Back Pain] : no back pain [Headache] : no headache [Dizziness] : no dizziness [Fainting] : no fainting [FreeTextEntry5] : sleeps on 3 pillows [de-identified] : walks with cane or walker.

## 2023-07-24 NOTE — PHYSICAL EXAM
[No Acute Distress] : no acute distress [Well Nourished] : well nourished [Well Developed] : well developed [Well-Appearing] : well-appearing [Normal Voice/Communication] : normal voice/communication [Normal Sclera/Conjunctiva] : normal sclera/conjunctiva [PERRL] : pupils equal round and reactive to light [Normal Outer Ear/Nose] : the outer ears and nose were normal in appearance [EOMI] : extraocular movements intact [Normal Oropharynx] : the oropharynx was normal [No Lymphadenopathy] : no lymphadenopathy [No JVD] : no jugular venous distention [Supple] : supple [Thyroid Normal, No Nodules] : the thyroid was normal and there were no nodules present [No Respiratory Distress] : no respiratory distress  [No Accessory Muscle Use] : no accessory muscle use [Clear to Auscultation] : lungs were clear to auscultation bilaterally [Normal Rate] : normal rate  [Regular Rhythm] : with a regular rhythm [Normal S1, S2] : normal S1 and S2 [No Murmur] : no murmur heard [No Carotid Bruits] : no carotid bruits [No Abdominal Bruit] : a ~M bruit was not heard ~T in the abdomen [No Edema] : there was no peripheral edema [No Palpable Aorta] : no palpable aorta [Soft] : abdomen soft [Non-distended] : non-distended [No Masses] : no abdominal mass palpated [No HSM] : no HSM [Normal Bowel Sounds] : normal bowel sounds [Normal Posterior Cervical Nodes] : no posterior cervical lymphadenopathy [Normal Anterior Cervical Nodes] : no anterior cervical lymphadenopathy [No CVA Tenderness] : no CVA  tenderness [No Spinal Tenderness] : no spinal tenderness [No Joint Swelling] : no joint swelling [Grossly Normal Strength/Tone] : grossly normal strength/tone [No Rash] : no rash [Coordination Grossly Intact] : coordination grossly intact [No Focal Deficits] : no focal deficits [Speech Grossly Normal] : speech grossly normal [Alert and Oriented x3] : oriented to person, place, and time [Normal Mood] : the mood was normal [de-identified] : dec DP pulses. [de-identified] : reports milf epigastric and ruq tenderness.  no rebound or gaurding. [de-identified] : unsteady gait [de-identified] : A&O, ? some dec memory, overall okay.

## 2023-08-14 ENCOUNTER — EMERGENCY (EMERGENCY)
Facility: HOSPITAL | Age: 78
LOS: 1 days | Discharge: HOME | End: 2023-08-14
Attending: STUDENT IN AN ORGANIZED HEALTH CARE EDUCATION/TRAINING PROGRAM | Admitting: STUDENT IN AN ORGANIZED HEALTH CARE EDUCATION/TRAINING PROGRAM
Payer: MEDICARE

## 2023-08-14 VITALS
HEART RATE: 82 BPM | SYSTOLIC BLOOD PRESSURE: 142 MMHG | OXYGEN SATURATION: 100 % | DIASTOLIC BLOOD PRESSURE: 75 MMHG | TEMPERATURE: 98 F | RESPIRATION RATE: 18 BRPM

## 2023-08-14 DIAGNOSIS — Z95.0 PRESENCE OF CARDIAC PACEMAKER: Chronic | ICD-10-CM

## 2023-08-14 DIAGNOSIS — Z95.810 PRESENCE OF AUTOMATIC (IMPLANTABLE) CARDIAC DEFIBRILLATOR: Chronic | ICD-10-CM

## 2023-08-14 DIAGNOSIS — Z95.5 PRESENCE OF CORONARY ANGIOPLASTY IMPLANT AND GRAFT: Chronic | ICD-10-CM

## 2023-08-14 PROCEDURE — 99285 EMERGENCY DEPT VISIT HI MDM: CPT

## 2023-08-14 PROCEDURE — 93010 ELECTROCARDIOGRAM REPORT: CPT

## 2023-08-14 RX ORDER — ACETAMINOPHEN 500 MG
1000 TABLET ORAL ONCE
Refills: 0 | Status: COMPLETED | OUTPATIENT
Start: 2023-08-14 | End: 2023-08-14

## 2023-08-14 NOTE — ED ADULT TRIAGE NOTE - CHIEF COMPLAINT QUOTE
c/o lower back pain and HA s/p fall on Friday reports mechanical denies LOC, denies blood thinner use, PERRLA extremities are strong and equal B/l pt endorses burning on urination  unsure of exact onset, hx fo DM, Afib, CAD.

## 2023-08-14 NOTE — ED ADULT TRIAGE NOTE - INTERNATIONAL TRAVEL
Findings discussed with patient in detail. Pt will closely monitor symptoms for any change and understands the importance of prompt outpatient follow up and will return if worse.       No

## 2023-08-14 NOTE — ED ADULT NURSE NOTE - NSFALLRISKINTERV_ED_ALL_ED

## 2023-08-14 NOTE — ED ADULT NURSE NOTE - OBJECTIVE STATEMENT
Pt received in 3B 78Y F Aox4, ambulatory Pt c/o lower back pain and HA s/p slip and fall on friday. Denies blood thinners, weakness, numbness, vision changes. Pt respiraitons even and unlabored, appears in NAD, instructed to callf for assistance as needed, pending MD latham.

## 2023-08-15 VITALS
TEMPERATURE: 98 F | DIASTOLIC BLOOD PRESSURE: 54 MMHG | SYSTOLIC BLOOD PRESSURE: 115 MMHG | OXYGEN SATURATION: 100 % | HEART RATE: 65 BPM | RESPIRATION RATE: 18 BRPM

## 2023-08-15 LAB
ALBUMIN SERPL ELPH-MCNC: 4 G/DL — SIGNIFICANT CHANGE UP (ref 3.3–5)
ALP SERPL-CCNC: 75 U/L — SIGNIFICANT CHANGE UP (ref 40–120)
ALT FLD-CCNC: 39 U/L — HIGH (ref 4–33)
ANION GAP SERPL CALC-SCNC: 12 MMOL/L — SIGNIFICANT CHANGE UP (ref 7–14)
APTT BLD: 29.8 SEC — SIGNIFICANT CHANGE UP (ref 24.5–35.6)
AST SERPL-CCNC: 46 U/L — HIGH (ref 4–32)
BASOPHILS # BLD AUTO: 0.06 K/UL — SIGNIFICANT CHANGE UP (ref 0–0.2)
BASOPHILS NFR BLD AUTO: 0.7 % — SIGNIFICANT CHANGE UP (ref 0–2)
BILIRUB SERPL-MCNC: 0.5 MG/DL — SIGNIFICANT CHANGE UP (ref 0.2–1.2)
BUN SERPL-MCNC: 37 MG/DL — HIGH (ref 7–23)
CALCIUM SERPL-MCNC: 9.9 MG/DL — SIGNIFICANT CHANGE UP (ref 8.4–10.5)
CHLORIDE SERPL-SCNC: 104 MMOL/L — SIGNIFICANT CHANGE UP (ref 98–107)
CO2 SERPL-SCNC: 21 MMOL/L — LOW (ref 22–31)
CREAT SERPL-MCNC: 1.25 MG/DL — SIGNIFICANT CHANGE UP (ref 0.5–1.3)
EGFR: 44 ML/MIN/1.73M2 — LOW
EOSINOPHIL # BLD AUTO: 0.1 K/UL — SIGNIFICANT CHANGE UP (ref 0–0.5)
EOSINOPHIL NFR BLD AUTO: 1.2 % — SIGNIFICANT CHANGE UP (ref 0–6)
GLUCOSE SERPL-MCNC: 118 MG/DL — HIGH (ref 70–99)
HCT VFR BLD CALC: 42.4 % — SIGNIFICANT CHANGE UP (ref 34.5–45)
HGB BLD-MCNC: 14.4 G/DL — SIGNIFICANT CHANGE UP (ref 11.5–15.5)
IANC: 5.07 K/UL — SIGNIFICANT CHANGE UP (ref 1.8–7.4)
IMM GRANULOCYTES NFR BLD AUTO: 0.3 % — SIGNIFICANT CHANGE UP (ref 0–0.9)
INR BLD: 1 RATIO — SIGNIFICANT CHANGE UP (ref 0.85–1.18)
LYMPHOCYTES # BLD AUTO: 2.22 K/UL — SIGNIFICANT CHANGE UP (ref 1–3.3)
LYMPHOCYTES # BLD AUTO: 25.7 % — SIGNIFICANT CHANGE UP (ref 13–44)
MCHC RBC-ENTMCNC: 32.1 PG — SIGNIFICANT CHANGE UP (ref 27–34)
MCHC RBC-ENTMCNC: 34 GM/DL — SIGNIFICANT CHANGE UP (ref 32–36)
MCV RBC AUTO: 94.6 FL — SIGNIFICANT CHANGE UP (ref 80–100)
MONOCYTES # BLD AUTO: 1.01 K/UL — HIGH (ref 0–0.9)
MONOCYTES NFR BLD AUTO: 11.7 % — SIGNIFICANT CHANGE UP (ref 2–14)
NEUTROPHILS # BLD AUTO: 5.22 K/UL — SIGNIFICANT CHANGE UP (ref 1.8–7.4)
NEUTROPHILS NFR BLD AUTO: 60.4 % — SIGNIFICANT CHANGE UP (ref 43–77)
NRBC # BLD: 0 /100 WBCS — SIGNIFICANT CHANGE UP (ref 0–0)
NRBC # FLD: 0 K/UL — SIGNIFICANT CHANGE UP (ref 0–0)
PLATELET # BLD AUTO: 130 K/UL — LOW (ref 150–400)
POTASSIUM SERPL-MCNC: 4.5 MMOL/L — SIGNIFICANT CHANGE UP (ref 3.5–5.3)
POTASSIUM SERPL-SCNC: 4.5 MMOL/L — SIGNIFICANT CHANGE UP (ref 3.5–5.3)
PROT SERPL-MCNC: 7.8 G/DL — SIGNIFICANT CHANGE UP (ref 6–8.3)
PROTHROM AB SERPL-ACNC: 11.2 SEC — SIGNIFICANT CHANGE UP (ref 9.5–13)
RBC # BLD: 4.48 M/UL — SIGNIFICANT CHANGE UP (ref 3.8–5.2)
RBC # FLD: 14.3 % — SIGNIFICANT CHANGE UP (ref 10.3–14.5)
SODIUM SERPL-SCNC: 137 MMOL/L — SIGNIFICANT CHANGE UP (ref 135–145)
WBC # BLD: 8.73 K/UL — SIGNIFICANT CHANGE UP (ref 3.8–10.5)
WBC # FLD AUTO: 8.73 K/UL — SIGNIFICANT CHANGE UP (ref 3.8–10.5)

## 2023-08-15 PROCEDURE — 73552 X-RAY EXAM OF FEMUR 2/>: CPT | Mod: 26,RT

## 2023-08-15 PROCEDURE — 73610 X-RAY EXAM OF ANKLE: CPT | Mod: 26,RT

## 2023-08-15 PROCEDURE — 73502 X-RAY EXAM HIP UNI 2-3 VIEWS: CPT | Mod: 26,RT

## 2023-08-15 PROCEDURE — 72125 CT NECK SPINE W/O DYE: CPT | Mod: 26,MA

## 2023-08-15 PROCEDURE — 71260 CT THORAX DX C+: CPT | Mod: 26,MA

## 2023-08-15 PROCEDURE — 73562 X-RAY EXAM OF KNEE 3: CPT | Mod: 26,RT

## 2023-08-15 PROCEDURE — 72131 CT LUMBAR SPINE W/O DYE: CPT | Mod: 26,MA

## 2023-08-15 PROCEDURE — 70450 CT HEAD/BRAIN W/O DYE: CPT | Mod: 26,MA

## 2023-08-15 PROCEDURE — 74177 CT ABD & PELVIS W/CONTRAST: CPT | Mod: 26,MA

## 2023-08-15 RX ORDER — INSULIN LISPRO 100/ML
6 VIAL (ML) SUBCUTANEOUS ONCE
Refills: 0 | Status: DISCONTINUED | OUTPATIENT
Start: 2023-08-15 | End: 2023-08-15

## 2023-08-15 RX ORDER — INSULIN LISPRO 100/ML
5 VIAL (ML) SUBCUTANEOUS ONCE
Refills: 0 | Status: COMPLETED | OUTPATIENT
Start: 2023-08-15 | End: 2023-08-15

## 2023-08-15 RX ORDER — ACETAMINOPHEN 500 MG
650 TABLET ORAL ONCE
Refills: 0 | Status: COMPLETED | OUTPATIENT
Start: 2023-08-15 | End: 2023-08-15

## 2023-08-15 RX ADMIN — Medication 5 UNIT(S): at 15:12

## 2023-08-15 RX ADMIN — Medication 1000 MILLIGRAM(S): at 01:55

## 2023-08-15 RX ADMIN — Medication 400 MILLIGRAM(S): at 00:21

## 2023-08-15 RX ADMIN — Medication 650 MILLIGRAM(S): at 15:19

## 2023-08-15 NOTE — ED PROVIDER NOTE - OBJECTIVE STATEMENT
78-year-old female history of CAD, diabetes, hypertension, on aspirin presents to the ED after mechanical fall yesterday while walking.  Patient accompanied by daughter at bedside who reports that patient was getting out of the car, tripped on the sidewalk and fell onto her butt.  Patient did not lose consciousness or strike her head.  She was helped up after the fall and was able to ambulate with assistance.  Patient report did feeling okay after the fall but then had increasing pain especially in the right side of her body, Taken laterally in her hip right lower ribs and back.  Denies headache, LOC, nausea, vomiting, weakness, tingling.  Has not taken anything for pain.    Daughter Emily [phone] .Daughter reports that she has physical therapy set up for patient at home. If trauma work up negative, reports feeling comfortable to taking

## 2023-08-15 NOTE — ED PROVIDER NOTE - PHYSICAL EXAMINATION
PHYSICAL EXAM:  CONSTITUTIONAL: Well appearing, awake, alert, oriented to person, place, time/situation and in no apparent distress.  HEAD: Atraumatic, no step offs, hematomas or lacerations  NECK: Supple, full ROM. No midline spinal tenderness.   EYES: Clear bilaterally, pupils equal, round and reactive to light.  ENMT: Airway patent, Nasal mucosa clear. Mouth with normal mucosa. Uvula is midline.   CARDIAC: Normal rate, regular rhythm. +S1/S2. No murmurs, rubs or gallops.  RESPIRATORY: Breathing unlabored. Breath sounds clear and equal bilaterally.  ABDOMEN:  Soft, nontender, nondistended. No rebound tenderness or guarding.  NEUROLOGICAL: Alert and oriented, no focal deficits, no motor or sensory deficits. CN2-12 intact. Sensation intact x4 extremities. Strength 5/5 of upper and lower extremities B/L.   MSK: R hip tenderness to palpation, pain with external rotation. R lateral malleolar edema/mild tenderness to palpation. R posterior low chest wall tenderness to palpation. Extremities otherwise nontender, full ROM.   No clubbing, cyanosis, or edema. Full range of motion of all extremities. L3/L4 tenderness to palpation, no other midline spinal or paraspinal tenderness. No spinal step-offs.  SKIN: Skin warm and dry. No evidence of rashes or lesions.

## 2023-08-15 NOTE — ED PROVIDER NOTE - CLINICAL SUMMARY MEDICAL DECISION MAKING FREE TEXT BOX
Fatimah Ashton DO PGY-3  78 year old female with PMH CAD on Asa presents to the ED after fall 1 day ago with r sided body pain. Hemodynamically stable, tenderness of R posterior low ribs, L3/L4 tenderness, R hip, R lateral ankle. Will obtain trauma CTs, XRs, labs, treat pain, and re-assess for dispo. If no acute traumatic injuries are found and patient is ambulatory, may be candidate for discharge home.

## 2023-08-15 NOTE — ED PROVIDER NOTE - NSFOLLOWUPINSTRUCTIONS_ED_ALL_ED_FT
Today you were seen in the ED for A fall    It was found that you have No signs of medical emergency warranting further evaluation in the emergency department.    A copy of your results attached this document below.    Please follow up with Your primary care provider in regards to today's event.    Please return to the emergency department if you have another fall loose consciousness feel dizzy or lightheaded nausea or vomiting confusion as this could be signs of a worsening medical condition warranting further evaluation in the emergency department    WHAT YOU NEED TO KNOW:    Fall prevention includes ways to make your home and other areas safer. It also includes ways you can move more carefully to prevent a fall. Health conditions that cause changes in your blood pressure, vision, or muscle strength and coordination may increase your risk for falls. Medicines may also increase your risk for falls if they make you dizzy, weak, or sleepy.     DISCHARGE INSTRUCTIONS:    Call 911 or have someone else call if:     You have fallen and are unconscious.      You have fallen and cannot move part of your body.    Contact your healthcare provider if:     You have fallen and have pain or a headache.      You have questions or concerns about your condition or care.    Fall prevention tips:     Stand or sit up slowly. This may help you keep your balance and prevent falls.      Use assistive devices as directed. Your healthcare provider may suggest that you use a cane or walker to help you keep your balance. You may need to have grab bars put in your bathroom near the toilet or in the shower.      Wear shoes that fit well and have soles that . Wear shoes both inside and outside. Use slippers with good . Do not wear shoes with high heels.      Wear a personal alarm. This is a device that allows you to call 911 if you fall and need help. Ask your healthcare provider for more information.      Stay active. Exercise can help strengthen your muscles and improve your balance. Your healthcare provider may recommend water aerobics or walking. He or she may also recommend physical therapy to improve your coordination. Never start an exercise program without talking to your healthcare provider first.       Manage your medical conditions. Keep all appointments with your healthcare providers. Visit your eye doctor as directed.    Home safety tips:     Add items to prevent falls in the bathroom. Put nonslip strips on your bath or shower floor to prevent you from slipping. Use a bath mat if you do not have carpet in the bathroom. This will prevent you from falling when you step out of the bath or shower. Use a shower seat so you do not need to stand while you shower. Sit on the toilet or a chair in your bathroom to dry yourself and put on clothing. This will prevent you from losing your balance from drying or dressing yourself while you are standing.       Keep paths clear. Remove books, shoes, and other objects from walkways and stairs. Place cords for telephones and lamps out of the way so that you do not need to walk over them. Tape them down if you cannot move them. Remove small rugs. If you cannot remove a rug, secure it with double-sided tape. This will prevent you from tripping.       Install bright lights in your home. Use night lights to help light paths to the bathroom or kitchen. Always turn on the light before you start walking.      Keep items you use often on shelves within reach. Do not use a step stool to help you reach an item.      Paint or place reflective tape on the edges of your stairs. This will help you see the stairs better.    Follow up with your healthcare provider as directed: Write down your questions so you remember to ask them during your visits.

## 2023-08-15 NOTE — ED PROVIDER NOTE - WR ORDER DATE AND TIME 3
Continues to have minimal black stools.  EGD negative for active bleed.  Hgb 9.  Hemodynamically stable.  GI following.  Plan for colonoscopy tomorrow.  Trend hgb.  Transfuse for hgb < 7.  Continue IVF and protonix.   14-Aug-2023 23:47

## 2023-08-15 NOTE — ED PROVIDER NOTE - PROGRESS NOTE DETAILS
Dr. Nick: Pt was signed out to me awaiting re-eval and ambulation. Pt currently resting in [facility]. Armando PGY 3 Patient ambulated with assistance patient at baseline walks with a cane to be discharged Dr. Nick: Pt able to ambulate. Pt's daughter to come pick pt up. Contacted Discharge Center to assist with spine f/u. Dr. Nick: Pt did have lunch and will normally get Humalog 6 Units with meals. Spoke with daughter who notes pt takes 5 Units. Order changed to 5 Units.

## 2023-08-15 NOTE — ED PROVIDER NOTE - PATIENT PORTAL LINK FT
You can access the FollowMyHealth Patient Portal offered by [facility] by registering at the following website: [email or web address]. By joining [facility]’s FollowMyHealth portal, you will also be able to view your health information using other applications (apps) compatible with our system.

## 2023-08-15 NOTE — ED ADULT NURSE REASSESSMENT NOTE - NS ED NURSE REASSESS COMMENT FT1
Pt a&ox4, denying chest pain, sob, n+v, headache, dizziness, fever, chills. Breathing even, unlabored. Pt medicated as per MAR. Pts daughter here for DC.
Report received from MILANA Garsia. Pt a&ox4, denying chest pain, sob, n+v, headache, dizziness, fever, chills. Breathing even, unlabored. Safety maintained. Pending DC home; family to come .

## 2023-08-15 NOTE — ED PROVIDER NOTE - ATTENDING CONTRIBUTION TO CARE
Has required transfusions during recent inpatient/LTAC stays   -was receiving EPO infusion at nephrology direction.   Hb 6.7 on 01/16.  Ordered a unit of blood.  Consent obtained.  Continue to monitor CBC.  Goal for transfusion hemoglobin less than 7.   Kraig Mtz DO:  patient seen and evaluated with the resident.  I was present for key portions of the History & Physical, and I agree with the Impression & Plan. 77 yo f PW right hip and back pain.  Patient reports fall 2 days prior, worsening pain.  Difficulty ambulating, worsening pain.  Denies N/V, F/C, head trauma.  Patient was HDS well-appearing, initially seen in hallway, required moved to room to have full trauma evaluation.  Patient was HDS, appearing, oriented.  Labs, trauma eval, reassess.  Patient to be signed out pending full work-up and reassessment.

## 2023-08-31 PROBLEM — M51.36 DEGENERATION OF INTERVERTEBRAL DISC OF LUMBAR REGION: Status: ACTIVE | Noted: 2023-08-31

## 2023-08-31 PROBLEM — M48.02 DEGENERATIVE CERVICAL SPINAL STENOSIS: Status: ACTIVE | Noted: 2023-08-31

## 2023-09-05 ENCOUNTER — NON-APPOINTMENT (OUTPATIENT)
Age: 78
End: 2023-09-05

## 2023-09-05 ENCOUNTER — APPOINTMENT (OUTPATIENT)
Age: 78
End: 2023-09-05
Payer: MEDICARE

## 2023-09-05 VITALS
OXYGEN SATURATION: 96 % | DIASTOLIC BLOOD PRESSURE: 71 MMHG | HEIGHT: 62 IN | SYSTOLIC BLOOD PRESSURE: 108 MMHG | BODY MASS INDEX: 27.23 KG/M2 | HEART RATE: 55 BPM | WEIGHT: 148 LBS

## 2023-09-05 DIAGNOSIS — M48.02 SPINAL STENOSIS, CERVICAL REGION: ICD-10-CM

## 2023-09-05 DIAGNOSIS — M51.36 OTHER INTERVERTEBRAL DISC DEGENERATION, LUMBAR REGION: ICD-10-CM

## 2023-09-05 PROCEDURE — 99203 OFFICE O/P NEW LOW 30 MIN: CPT

## 2023-09-05 NOTE — ASSESSMENT
[FreeTextEntry1] : 78 y/ F, presented to [facility] ED after mechanical fall in August 2023. She p/w pain in right hip, right lower back and ribs.  Presents today with her daughter for follow up. Reports right sided neck pain and right sided lower back pain, intermittent radiation to left shoulder and intermittent radiation to LLE into left ankle with she is walking. She has tingling in the sole of the feet.  She takes Tylenol as needed for pain. She is going to start PT soon. No weakness, numbness, saddle anesthesia, bladder or bowel issues. Imaging studies CT cervical and lumbar spine reviewed and discussed with patient and daughter. CT cervical spine with multilevel degenerative disc disease, facet joint arthrosis with varying degrees of mild to moderate foraminal stenosis. CT lumbar spine with no acute fractures. Multilevel degenerative disc disease most notable at L4/5 and L5/S1. She has an implanted defibrillator and cannot have MRIs.  - Agree with starting physical therapy - Referral for pain management provided for possible ESTHER and medication management - can consider CT myelogram if she continues to have persistent pain

## 2023-09-05 NOTE — ADDENDUM
[FreeTextEntry1] : I have personally seen , performed an exam and reviewed her imaging on PACS with her and her family. 77 yo s/p fall and increased neck and LBP. Prior hx of right intermittent radicular pain down to the ankle, possible L5? CT with no frx, but indicates L4-5 and L5-S1 DDD and some stenosis at L4-5.  Could not get an MRI with her ICD device.  Agree with PT, add Robaxin PM referral Follow up PRN  I have spent 35 mins in this encounter.  Dionicio Arias MD

## 2023-09-05 NOTE — HISTORY OF PRESENT ILLNESS
[de-identified] : Patient is an 78-year-old female with PMHx of CAD, diabetes, hypertension, on aspirin presented to the [facility] ED after mechanical fall in August 2023 while walking. Patient tripped on the sidewalk and fell onto her butt. She presented to ED with pain in right hip, right lower back and ribs. Patient imaging studies done including CT head, cervical and lumbar spine. No acute findings on CT head. CT cervical spine with multilevel degenerative disc disease, facet joint arthrosis with varying degrees of mild to moderate foraminal stenosis. 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 Multilevel degenerative disc disease most notable at L4/5 and L5/S1 with spinal stenosis.   Presents today with her daughter for follow up. Reports right sided neck pain and right sided lower back pain, intermittent radiation to left shoulder and intermittent radiation to left lower extremity into left ankle with she is walking. She has tingling in the sole of the feet.  She takes Tylenol as needed for pain. She is going to start PT soon. No weakness, numbness, saddle anesthesia, bladder or bowel issues.

## 2023-09-05 NOTE — PHYSICAL EXAM
[General Appearance - Alert] : alert [General Appearance - In No Acute Distress] : in no acute distress [Oriented To Time, Place, And Person] : oriented to person, place, and time [Cranial Nerves Optic (II)] : visual acuity intact bilaterally,  pupils equal round and reactive to light [Cranial Nerves Oculomotor (III)] : extraocular motion intact [Cranial Nerves Trigeminal (V)] : facial sensation intact symmetrically [Cranial Nerves Facial (VII)] : face symmetrical [Cranial Nerves Vestibulocochlear (VIII)] : hearing was intact bilaterally [Cranial Nerves Accessory (XI - Cranial And Spinal)] : head turning and shoulder shrug symmetric [Cranial Nerves Hypoglossal (XII)] : there was no tongue deviation with protrusion [Motor Tone] : muscle tone was normal in all four extremities [Motor Strength] : muscle strength was normal in all four extremities [Involuntary Movements] : no involuntary movements were seen [Sensation Tactile Decrease] : light touch was intact [Abnormal Walk] : normal gait [Balance] : balance was intact [1+] : Ankle jerk left 1+ [Full ROM] : full ROM [No Pain with ROM] : no pain with motion in any direction [No Visual Abnormalities] : no visible abnormailities [Sclera] : the sclera and conjunctiva were normal [PERRL With Normal Accommodation] : pupils were equal in size, round, reactive to light, with normal accommodation [Extraocular Movements] : extraocular movements were intact [Outer Ear] : the ears and nose were normal in appearance [Hearing Threshold Finger Rub Not [place]] : hearing was normal [Neck Appearance] : the appearance of the neck was normal [] : no respiratory distress [Skin Color & Pigmentation] : normal skin color and pigmentation [Hampton] : Hampton's sign was not demonstrated [FreeTextEntry8] : uses a cane to ambulate  [Straight-Leg Raise Test - Left] : straight leg raise of the left leg was negative [Straight-Leg Raise Test - Right] : straight leg raise  of the right leg was negative

## 2023-09-11 ENCOUNTER — APPOINTMENT (OUTPATIENT)
Dept: ENDOCRINOLOGY | Facility: CLINIC | Age: 78
End: 2023-09-11
Payer: MEDICARE

## 2023-09-11 VITALS — HEIGHT: 62 IN | HEART RATE: 67 BPM | OXYGEN SATURATION: 97 % | BODY MASS INDEX: 26.5 KG/M2 | WEIGHT: 144 LBS

## 2023-09-11 DIAGNOSIS — Z86.39 PERSONAL HISTORY OF OTHER ENDOCRINE, NUTRITIONAL AND METABOLIC DISEASE: ICD-10-CM

## 2023-09-11 PROCEDURE — 99214 OFFICE O/P EST MOD 30 MIN: CPT | Mod: 25

## 2023-09-11 RX ORDER — BLOOD SUGAR DIAGNOSTIC
STRIP MISCELLANEOUS
Qty: 400 | Refills: 3 | Status: ACTIVE | COMMUNITY
Start: 2020-07-08 | End: 1900-01-01

## 2023-09-26 ENCOUNTER — RX RENEWAL (OUTPATIENT)
Age: 78
End: 2023-09-26

## 2023-09-27 ENCOUNTER — APPOINTMENT (OUTPATIENT)
Dept: HEART FAILURE | Facility: CLINIC | Age: 78
End: 2023-09-27
Payer: MEDICARE

## 2023-09-27 ENCOUNTER — NON-APPOINTMENT (OUTPATIENT)
Age: 78
End: 2023-09-27

## 2023-09-27 VITALS
BODY MASS INDEX: 26.5 KG/M2 | HEART RATE: 67 BPM | SYSTOLIC BLOOD PRESSURE: 106 MMHG | DIASTOLIC BLOOD PRESSURE: 71 MMHG | HEIGHT: 62 IN | OXYGEN SATURATION: 98 % | WEIGHT: 144 LBS

## 2023-09-27 PROCEDURE — 99214 OFFICE O/P EST MOD 30 MIN: CPT | Mod: 25

## 2023-09-27 PROCEDURE — 93000 ELECTROCARDIOGRAM COMPLETE: CPT

## 2023-09-27 RX ORDER — OMEPRAZOLE 40 MG/1
40 CAPSULE, DELAYED RELEASE ORAL
Qty: 90 | Refills: 3 | Status: DISCONTINUED | COMMUNITY
Start: 2022-04-26 | End: 2023-09-27

## 2023-09-27 RX ORDER — MULTIVIT-MIN/IRON/FOLIC ACID/K 18-600-40
600-10 CAPSULE ORAL
Qty: 90 | Refills: 3 | Status: ACTIVE | COMMUNITY
Start: 2021-01-06 | End: 1900-01-01

## 2023-09-28 ENCOUNTER — RX RENEWAL (OUTPATIENT)
Age: 78
End: 2023-09-28

## 2023-10-14 ENCOUNTER — RX RENEWAL (OUTPATIENT)
Age: 78
End: 2023-10-14

## 2023-10-23 ENCOUNTER — APPOINTMENT (OUTPATIENT)
Dept: ELECTROPHYSIOLOGY | Facility: CLINIC | Age: 78
End: 2023-10-23
Payer: MEDICARE

## 2023-10-23 ENCOUNTER — NON-APPOINTMENT (OUTPATIENT)
Age: 78
End: 2023-10-23

## 2023-10-23 PROCEDURE — 93296 REM INTERROG EVL PM/IDS: CPT

## 2023-10-23 PROCEDURE — 93295 DEV INTERROG REMOTE 1/2/MLT: CPT

## 2023-10-24 ENCOUNTER — APPOINTMENT (OUTPATIENT)
Dept: INTERNAL MEDICINE | Facility: CLINIC | Age: 78
End: 2023-10-24
Payer: MEDICARE

## 2023-10-24 VITALS
SYSTOLIC BLOOD PRESSURE: 118 MMHG | RESPIRATION RATE: 16 BRPM | OXYGEN SATURATION: 98 % | WEIGHT: 146 LBS | HEIGHT: 62 IN | BODY MASS INDEX: 26.87 KG/M2 | DIASTOLIC BLOOD PRESSURE: 60 MMHG | HEART RATE: 86 BPM

## 2023-10-24 DIAGNOSIS — Z23 ENCOUNTER FOR IMMUNIZATION: ICD-10-CM

## 2023-10-24 LAB
25(OH)D3 SERPL-MCNC: 33.4 NG/ML
ALBUMIN SERPL ELPH-MCNC: 4.1 G/DL
ALP BLD-CCNC: 72 U/L
ALT SERPL-CCNC: 35 U/L
ANION GAP SERPL CALC-SCNC: 15 MMOL/L
AST SERPL-CCNC: 39 U/L
BILIRUB SERPL-MCNC: 0.6 MG/DL
BUN SERPL-MCNC: 31 MG/DL
CALCIUM SERPL-MCNC: 9.9 MG/DL
CALCIUM SERPL-MCNC: 9.9 MG/DL
CHLORIDE SERPL-SCNC: 104 MMOL/L
CHOLEST SERPL-MCNC: 141 MG/DL
CO2 SERPL-SCNC: 20 MMOL/L
CREAT SERPL-MCNC: 1.35 MG/DL
EGFR: 40 ML/MIN/1.73M2
ESTIMATED AVERAGE GLUCOSE: 143 MG/DL
FOLATE SERPL-MCNC: >20 NG/ML
FRUCTOSAMINE SERPL-MCNC: 290 UMOL/L
GLUCOSE SERPL-MCNC: 126 MG/DL
HBA1C MFR BLD HPLC: 6.6 %
HDLC SERPL-MCNC: 53 MG/DL
LDLC SERPL CALC-MCNC: 74 MG/DL
MAGNESIUM SERPL-MCNC: 2.1 MG/DL
NONHDLC SERPL-MCNC: 88 MG/DL
PARATHYROID HORMONE INTACT: 52 PG/ML
POTASSIUM SERPL-SCNC: 4.3 MMOL/L
PROT SERPL-MCNC: 7.3 G/DL
SODIUM SERPL-SCNC: 139 MMOL/L
TRIGL SERPL-MCNC: 71 MG/DL
TSH SERPL-ACNC: 2.59 UIU/ML
VIT B12 SERPL-MCNC: 1447 PG/ML

## 2023-10-24 PROCEDURE — 36415 COLL VENOUS BLD VENIPUNCTURE: CPT

## 2023-10-24 PROCEDURE — 90662 IIV NO PRSV INCREASED AG IM: CPT

## 2023-10-24 PROCEDURE — G0008: CPT

## 2023-10-24 PROCEDURE — 99214 OFFICE O/P EST MOD 30 MIN: CPT | Mod: 25

## 2023-10-25 ENCOUNTER — APPOINTMENT (OUTPATIENT)
Dept: INTERNAL MEDICINE | Facility: CLINIC | Age: 78
End: 2023-10-25
Payer: MEDICARE

## 2023-10-25 VITALS
OXYGEN SATURATION: 97 % | HEART RATE: 60 BPM | BODY MASS INDEX: 26.87 KG/M2 | DIASTOLIC BLOOD PRESSURE: 76 MMHG | HEIGHT: 62 IN | WEIGHT: 146 LBS | SYSTOLIC BLOOD PRESSURE: 124 MMHG

## 2023-10-25 PROCEDURE — 99213 OFFICE O/P EST LOW 20 MIN: CPT

## 2023-10-29 ENCOUNTER — APPOINTMENT (OUTPATIENT)
Dept: ULTRASOUND IMAGING | Facility: IMAGING CENTER | Age: 78
End: 2023-10-29
Payer: MEDICARE

## 2023-10-29 ENCOUNTER — OUTPATIENT (OUTPATIENT)
Dept: OUTPATIENT SERVICES | Facility: HOSPITAL | Age: 78
LOS: 1 days | End: 2023-10-29
Payer: MEDICARE

## 2023-10-29 DIAGNOSIS — Z95.810 PRESENCE OF AUTOMATIC (IMPLANTABLE) CARDIAC DEFIBRILLATOR: Chronic | ICD-10-CM

## 2023-10-29 DIAGNOSIS — R10.9 UNSPECIFIED ABDOMINAL PAIN: ICD-10-CM

## 2023-10-29 DIAGNOSIS — Z95.0 PRESENCE OF CARDIAC PACEMAKER: Chronic | ICD-10-CM

## 2023-10-29 DIAGNOSIS — Z95.5 PRESENCE OF CORONARY ANGIOPLASTY IMPLANT AND GRAFT: Chronic | ICD-10-CM

## 2023-10-29 PROCEDURE — 76700 US EXAM ABDOM COMPLETE: CPT

## 2023-10-29 PROCEDURE — 76700 US EXAM ABDOM COMPLETE: CPT | Mod: 26

## 2023-11-02 ENCOUNTER — RX CHANGE (OUTPATIENT)
Age: 78
End: 2023-11-02

## 2023-11-02 RX ORDER — FAMOTIDINE 20 MG/1
20 TABLET, FILM COATED ORAL
Qty: 30 | Refills: 5 | Status: DISCONTINUED | COMMUNITY
Start: 2023-07-24 | End: 2023-11-02

## 2023-11-27 ENCOUNTER — RX CHANGE (OUTPATIENT)
Age: 78
End: 2023-11-27

## 2023-11-27 RX ORDER — PANTOPRAZOLE 40 MG/1
40 TABLET, DELAYED RELEASE ORAL TWICE DAILY
Qty: 60 | Refills: 1 | Status: DISCONTINUED | COMMUNITY
Start: 2022-05-11 | End: 2023-11-27

## 2024-01-08 ENCOUNTER — APPOINTMENT (OUTPATIENT)
Dept: NEUROLOGY | Facility: CLINIC | Age: 79
End: 2024-01-08
Payer: MEDICARE

## 2024-01-08 VITALS
SYSTOLIC BLOOD PRESSURE: 106 MMHG | WEIGHT: 150 LBS | HEIGHT: 62 IN | DIASTOLIC BLOOD PRESSURE: 70 MMHG | HEART RATE: 67 BPM | BODY MASS INDEX: 27.6 KG/M2

## 2024-01-08 PROCEDURE — 99205 OFFICE O/P NEW HI 60 MIN: CPT

## 2024-01-08 NOTE — DISCUSSION/SUMMARY
[FreeTextEntry1] : 78-year-old woman who is here for initial consultation of memory loss likely due to Alzheimer disease versus vascular dementia.  Will obtain a CAT scan of the head for any signs of vascular dementia versus atrophy as her defibrillator is contraindicated for MRIs.  Patient will obtain reversible causes of dementia along with neuropsych testing.  We had a lengthy discussion regarding treatment for her memory loss.  Patient will undergo evaluation for send would like to hold off on any treatment such as Namenda.    Patient will follow-up with me after the studies are completed.    I spent the time noted on the day of this patient encounter preparing for, providing and documenting the above E/M service and counseling and educate patient on differential, workup, disease course, and treatment/management. Education was provided to the patient during this encounter. All questions and concerns were answered and addressed in detail. The patient verbalized understanding and agreed to plan. Patient was advised to continue to monitor for neurologic symptoms and to notify my office or go to the nearest emergency room if there are any changes. Any orders/referrals and communications were provided as well.  Side effects of the above medications were discussed in detail including but not limited to applicable black box warning and teratogenicity as appropriate.  Patient was advised to bring previous records to my office.

## 2024-01-08 NOTE — PHYSICAL EXAM
[General Appearance - Alert] : alert [Oriented To Time, Place, And Person] : oriented to person, place, and time [Date / Time ___ / 5] : date / time [unfilled] / 5 [Place ___ / 5] : place [unfilled] / 5 [Registration ___ / 3] : registration [unfilled] / 3 [Serial Sevens ___/5] : serial sevens [unfilled] / 5 [Naming 2 Objects ___ / 2] : naming two objects [unfilled] / 2 [Repeating a Sentence ___ / 1] : repeating a sentence [unfilled] / 1 [Writing a Sentence ___ / 1] : write sentence [unfilled] / 1 [3-stage Verbal Command ___ / 3] : three-stage verbal command [unfilled] / 3 [Written Command ___ / 1] : written command [unfilled] / 1 [Copy a Design ___ / 1] : copy a design [unfilled] / 1 [Recall ___ / 3] : recall [unfilled] / 3 [Cranial Nerves Optic (II)] : visual acuity intact bilaterally,  visual fields full to confrontation, pupils equal round and reactive to light [Cranial Nerves Oculomotor (III)] : extraocular motion intact [Cranial Nerves Facial (VII)] : face symmetrical [Cranial Nerves Vestibulocochlear (VIII)] : hearing was intact bilaterally [Cranial Nerves Accessory (XI - Cranial And Spinal)] : head turning and shoulder shrug symmetric [Cranial Nerves Hypoglossal (XII)] : there was no tongue deviation with protrusion [Motor Tone] : muscle tone was normal in all four extremities [Sensation Tactile Decrease] : light touch was intact [Coordination - Dysmetria Impaired Finger-to-Nose Bilateral] : not present [1+] : Biceps left 1+ [0] : Patella left 0 [FreeTextEntry6] : Right hip pain [FreeTextEntry8] : Walks with an antalgic gait due to right hip pain with a cane

## 2024-01-09 ENCOUNTER — APPOINTMENT (OUTPATIENT)
Dept: INTERNAL MEDICINE | Facility: CLINIC | Age: 79
End: 2024-01-09
Payer: MEDICARE

## 2024-01-09 VITALS
WEIGHT: 150 LBS | HEIGHT: 62 IN | DIASTOLIC BLOOD PRESSURE: 82 MMHG | HEART RATE: 64 BPM | SYSTOLIC BLOOD PRESSURE: 132 MMHG | TEMPERATURE: 98 F | OXYGEN SATURATION: 94 % | BODY MASS INDEX: 27.6 KG/M2

## 2024-01-09 DIAGNOSIS — R10.9 UNSPECIFIED ABDOMINAL PAIN: ICD-10-CM

## 2024-01-09 DIAGNOSIS — R63.4 ABNORMAL WEIGHT LOSS: ICD-10-CM

## 2024-01-09 PROCEDURE — 99213 OFFICE O/P EST LOW 20 MIN: CPT

## 2024-01-09 RX ORDER — POLYETHYLENE GLYCOL 3350 17 G/17G
17 POWDER, FOR SOLUTION ORAL DAILY
Qty: 30 | Refills: 5 | Status: ACTIVE | COMMUNITY
Start: 2024-01-09 | End: 1900-01-01

## 2024-01-10 LAB
ALBUMIN SERPL ELPH-MCNC: 4.5 G/DL
ALP BLD-CCNC: 73 U/L
ALT SERPL-CCNC: 28 U/L
ANION GAP SERPL CALC-SCNC: 11 MMOL/L
AST SERPL-CCNC: 38 U/L
BILIRUB SERPL-MCNC: 0.5 MG/DL
BUN SERPL-MCNC: 35 MG/DL
CALCIUM SERPL-MCNC: 10.1 MG/DL
CHLORIDE SERPL-SCNC: 103 MMOL/L
CO2 SERPL-SCNC: 24 MMOL/L
CREAT SERPL-MCNC: 1.27 MG/DL
EGFR: 43 ML/MIN/1.73M2
GLUCOSE SERPL-MCNC: 104 MG/DL
POTASSIUM SERPL-SCNC: 4.8 MMOL/L
PROT SERPL-MCNC: 7.5 G/DL
SODIUM SERPL-SCNC: 138 MMOL/L

## 2024-01-12 ENCOUNTER — RESULT REVIEW (OUTPATIENT)
Age: 79
End: 2024-01-12

## 2024-01-17 ENCOUNTER — OUTPATIENT (OUTPATIENT)
Dept: OUTPATIENT SERVICES | Facility: HOSPITAL | Age: 79
LOS: 1 days | End: 2024-01-17
Payer: MEDICARE

## 2024-01-17 ENCOUNTER — APPOINTMENT (OUTPATIENT)
Dept: CT IMAGING | Facility: CLINIC | Age: 79
End: 2024-01-17
Payer: MEDICARE

## 2024-01-17 DIAGNOSIS — Z95.810 PRESENCE OF AUTOMATIC (IMPLANTABLE) CARDIAC DEFIBRILLATOR: Chronic | ICD-10-CM

## 2024-01-17 DIAGNOSIS — R63.4 ABNORMAL WEIGHT LOSS: ICD-10-CM

## 2024-01-17 DIAGNOSIS — Z95.5 PRESENCE OF CORONARY ANGIOPLASTY IMPLANT AND GRAFT: Chronic | ICD-10-CM

## 2024-01-17 DIAGNOSIS — Z95.0 PRESENCE OF CARDIAC PACEMAKER: Chronic | ICD-10-CM

## 2024-01-17 PROCEDURE — 74177 CT ABD & PELVIS W/CONTRAST: CPT | Mod: 26

## 2024-01-17 PROCEDURE — 74177 CT ABD & PELVIS W/CONTRAST: CPT

## 2024-01-18 ENCOUNTER — APPOINTMENT (OUTPATIENT)
Dept: CT IMAGING | Facility: CLINIC | Age: 79
End: 2024-01-18
Payer: MEDICARE

## 2024-01-18 ENCOUNTER — OUTPATIENT (OUTPATIENT)
Dept: OUTPATIENT SERVICES | Facility: HOSPITAL | Age: 79
LOS: 1 days | End: 2024-01-18
Payer: MEDICARE

## 2024-01-18 DIAGNOSIS — Z95.5 PRESENCE OF CORONARY ANGIOPLASTY IMPLANT AND GRAFT: Chronic | ICD-10-CM

## 2024-01-18 DIAGNOSIS — R41.3 OTHER AMNESIA: ICD-10-CM

## 2024-01-18 DIAGNOSIS — Z95.810 PRESENCE OF AUTOMATIC (IMPLANTABLE) CARDIAC DEFIBRILLATOR: Chronic | ICD-10-CM

## 2024-01-18 DIAGNOSIS — Z95.0 PRESENCE OF CARDIAC PACEMAKER: Chronic | ICD-10-CM

## 2024-01-18 PROCEDURE — 70450 CT HEAD/BRAIN W/O DYE: CPT

## 2024-01-18 PROCEDURE — 70450 CT HEAD/BRAIN W/O DYE: CPT | Mod: 26

## 2024-01-22 ENCOUNTER — APPOINTMENT (OUTPATIENT)
Dept: ELECTROPHYSIOLOGY | Facility: CLINIC | Age: 79
End: 2024-01-22
Payer: MEDICARE

## 2024-01-22 ENCOUNTER — NON-APPOINTMENT (OUTPATIENT)
Age: 79
End: 2024-01-22

## 2024-01-22 PROCEDURE — 93295 DEV INTERROG REMOTE 1/2/MLT: CPT

## 2024-01-22 PROCEDURE — 93296 REM INTERROG EVL PM/IDS: CPT

## 2024-01-30 ENCOUNTER — LABORATORY RESULT (OUTPATIENT)
Age: 79
End: 2024-01-30

## 2024-01-30 ENCOUNTER — APPOINTMENT (OUTPATIENT)
Dept: INTERNAL MEDICINE | Facility: CLINIC | Age: 79
End: 2024-01-30
Payer: MEDICARE

## 2024-01-30 VITALS
BODY MASS INDEX: 27.05 KG/M2 | WEIGHT: 147 LBS | HEART RATE: 74 BPM | SYSTOLIC BLOOD PRESSURE: 112 MMHG | HEIGHT: 62 IN | DIASTOLIC BLOOD PRESSURE: 62 MMHG | OXYGEN SATURATION: 98 % | RESPIRATION RATE: 16 BRPM

## 2024-01-30 DIAGNOSIS — R74.01 ELEVATION OF LEVELS OF LIVER TRANSAMINASE LEVELS: ICD-10-CM

## 2024-01-30 DIAGNOSIS — Z87.828 PERSONAL HISTORY OF OTHER (HEALED) PHYSICAL INJURY AND TRAUMA: ICD-10-CM

## 2024-01-30 DIAGNOSIS — L85.3 XEROSIS CUTIS: ICD-10-CM

## 2024-01-30 DIAGNOSIS — H92.01 OTALGIA, RIGHT EAR: ICD-10-CM

## 2024-01-30 DIAGNOSIS — Z11.59 ENCOUNTER FOR SCREENING FOR OTHER VIRAL DISEASES: ICD-10-CM

## 2024-01-30 DIAGNOSIS — Z87.09 PERSONAL HISTORY OF OTHER DISEASES OF THE RESPIRATORY SYSTEM: ICD-10-CM

## 2024-01-30 DIAGNOSIS — E11.65 TYPE 2 DIABETES MELLITUS WITH HYPERGLYCEMIA: ICD-10-CM

## 2024-01-30 DIAGNOSIS — E56.9 VITAMIN DEFICIENCY, UNSPECIFIED: ICD-10-CM

## 2024-01-30 DIAGNOSIS — Z23 ENCOUNTER FOR IMMUNIZATION: ICD-10-CM

## 2024-01-30 LAB
25(OH)D3 SERPL-MCNC: 36.8 NG/ML
ALBUMIN SERPL ELPH-MCNC: 4.4 G/DL
ALP BLD-CCNC: 77 U/L
ALT SERPL-CCNC: 36 U/L
ANION GAP SERPL CALC-SCNC: 13 MMOL/L
APPEARANCE: CLEAR
AST SERPL-CCNC: 41 U/L
BACTERIA: NEGATIVE /HPF
BASOPHILS # BLD AUTO: 0.04 K/UL
BASOPHILS NFR BLD AUTO: 0.5 %
BILIRUB SERPL-MCNC: 0.5 MG/DL
BILIRUBIN URINE: NEGATIVE
BLOOD URINE: NEGATIVE
BUN SERPL-MCNC: 35 MG/DL
CALCIUM SERPL-MCNC: 10.2 MG/DL
CALCIUM SERPL-MCNC: 10.2 MG/DL
CAST: 0 /LPF
CHLORIDE SERPL-SCNC: 104 MMOL/L
CHOLEST SERPL-MCNC: 157 MG/DL
CO2 SERPL-SCNC: 22 MMOL/L
COLOR: YELLOW
CREAT SERPL-MCNC: 1.35 MG/DL
CREAT SPEC-SCNC: 85 MG/DL
EGFR: 40 ML/MIN/1.73M2
EOSINOPHIL # BLD AUTO: 0.06 K/UL
EOSINOPHIL NFR BLD AUTO: 0.8 %
EPITHELIAL CELLS: 3 /HPF
ESTIMATED AVERAGE GLUCOSE: 148 MG/DL
FRUCTOSAMINE SERPL-MCNC: 293 UMOL/L
GLUCOSE QUALITATIVE U: >=1000 MG/DL
GLUCOSE SERPL-MCNC: 244 MG/DL
HBA1C MFR BLD HPLC: 6.8 %
HCT VFR BLD CALC: 43.4 %
HDLC SERPL-MCNC: 50 MG/DL
HGB BLD-MCNC: 14 G/DL
IMM GRANULOCYTES NFR BLD AUTO: 0.3 %
KETONES URINE: NEGATIVE MG/DL
LDLC SERPL CALC-MCNC: 92 MG/DL
LEUKOCYTE ESTERASE URINE: NEGATIVE
LYMPHOCYTES # BLD AUTO: 1.3 K/UL
LYMPHOCYTES NFR BLD AUTO: 17.5 %
MAN DIFF?: NORMAL
MCHC RBC-ENTMCNC: 31.9 PG
MCHC RBC-ENTMCNC: 32.3 GM/DL
MCV RBC AUTO: 98.9 FL
MICROALBUMIN 24H UR DL<=1MG/L-MCNC: <1.2 MG/DL
MICROALBUMIN/CREAT 24H UR-RTO: NORMAL MG/G
MICROSCOPIC-UA: NORMAL
MONOCYTES # BLD AUTO: 0.77 K/UL
MONOCYTES NFR BLD AUTO: 10.3 %
NEUTROPHILS # BLD AUTO: 5.25 K/UL
NEUTROPHILS NFR BLD AUTO: 70.6 %
NITRITE URINE: NEGATIVE
NONHDLC SERPL-MCNC: 107 MG/DL
PARATHYROID HORMONE INTACT: 53 PG/ML
PH URINE: 6
PLATELET # BLD AUTO: 124 K/UL
POTASSIUM SERPL-SCNC: 4.7 MMOL/L
PROT SERPL-MCNC: 7.8 G/DL
PROTEIN URINE: NEGATIVE MG/DL
RBC # BLD: 4.39 M/UL
RBC # FLD: 14.6 %
RED BLOOD CELLS URINE: 2 /HPF
SODIUM SERPL-SCNC: 138 MMOL/L
SPECIFIC GRAVITY URINE: 1.03
TRIGL SERPL-MCNC: 79 MG/DL
UROBILINOGEN URINE: 0.2 MG/DL
WBC # FLD AUTO: 7.44 K/UL
WHITE BLOOD CELLS URINE: 2 /HPF

## 2024-01-30 PROCEDURE — 36415 COLL VENOUS BLD VENIPUNCTURE: CPT

## 2024-01-30 PROCEDURE — 99214 OFFICE O/P EST MOD 30 MIN: CPT

## 2024-01-30 PROCEDURE — G2211 COMPLEX E/M VISIT ADD ON: CPT

## 2024-01-30 NOTE — HEALTH RISK ASSESSMENT
[No] : In the past 12 months have you used drugs other than those required for medical reasons? No [0] : 2) Feeling down, depressed, or hopeless: Not at all (0) [PHQ-2 Negative - No further assessment needed] : PHQ-2 Negative - No further assessment needed [Never] : Never [Audit-CScore] : 0 [[id number]] : 0

## 2024-01-30 NOTE — REVIEW OF SYSTEMS
[Fatigue] : fatigue [Hearing Loss] : hearing loss [Dyspnea on Exertion] : dyspnea on exertion [Abdominal Pain] : abdominal pain [Nausea] : nausea [Constipation] : constipation [Nocturia] : nocturia [Frequency] : frequency [Joint Pain] : joint pain [Muscle Weakness] : muscle weakness [Memory Loss] : memory loss [Unsteady Walking] : ataxia [Negative] : Heme/Lymph [Fever] : no fever [Chills] : no chills [Hot Flashes] : no hot flashes [Night Sweats] : no night sweats [Recent Change In Weight] : ~T no recent weight change [Shortness Of Breath] : no shortness of breath [Wheezing] : no wheezing [Cough] : no cough [Diarrhea] : diarrhea [Vomiting] : no vomiting [Heartburn] : no heartburn [Melena] : no melena [Joint Stiffness] : no joint stiffness [Joint Swelling] : no joint swelling [Muscle Pain] : no muscle pain [Back Pain] : no back pain [Headache] : no headache [Dizziness] : no dizziness [Fainting] : no fainting [FreeTextEntry5] : sleeps on 3 pillows [de-identified] : walks with cane or walker.

## 2024-01-30 NOTE — ASSESSMENT
[FreeTextEntry1] : due for dexa later this year.  d/w them considering prevnar 20 later in the year.

## 2024-01-30 NOTE — PHYSICAL EXAM
[No Acute Distress] : no acute distress [Well Nourished] : well nourished [Well Developed] : well developed [Well-Appearing] : well-appearing [Normal Voice/Communication] : normal voice/communication [Normal Sclera/Conjunctiva] : normal sclera/conjunctiva [PERRL] : pupils equal round and reactive to light [EOMI] : extraocular movements intact [Normal Outer Ear/Nose] : the outer ears and nose were normal in appearance [Normal Oropharynx] : the oropharynx was normal [No JVD] : no jugular venous distention [No Lymphadenopathy] : no lymphadenopathy [Supple] : supple [Thyroid Normal, No Nodules] : the thyroid was normal and there were no nodules present [No Respiratory Distress] : no respiratory distress  [No Accessory Muscle Use] : no accessory muscle use [Clear to Auscultation] : lungs were clear to auscultation bilaterally [Normal Rate] : normal rate  [Regular Rhythm] : with a regular rhythm [Normal S1, S2] : normal S1 and S2 [No Murmur] : no murmur heard [No Carotid Bruits] : no carotid bruits [No Abdominal Bruit] : a ~M bruit was not heard ~T in the abdomen [No Edema] : there was no peripheral edema [No Palpable Aorta] : no palpable aorta [Soft] : abdomen soft [Non-distended] : non-distended [No Masses] : no abdominal mass palpated [No HSM] : no HSM [Normal Bowel Sounds] : normal bowel sounds [Normal Posterior Cervical Nodes] : no posterior cervical lymphadenopathy [Normal Anterior Cervical Nodes] : no anterior cervical lymphadenopathy [No CVA Tenderness] : no CVA  tenderness [No Spinal Tenderness] : no spinal tenderness [No Joint Swelling] : no joint swelling [Grossly Normal Strength/Tone] : grossly normal strength/tone [No Rash] : no rash [Coordination Grossly Intact] : coordination grossly intact [No Focal Deficits] : no focal deficits [Speech Grossly Normal] : speech grossly normal [Alert and Oriented x3] : oriented to person, place, and time [Normal Mood] : the mood was normal [de-identified] : dec DP pulses. [de-identified] : reports milf epigastric and ruq tenderness.  no rebound or gaurding. [de-identified] : unsteady gait [de-identified] : A&O, ? some dec memory, overall okay.

## 2024-01-30 NOTE — HISTORY OF PRESENT ILLNESS
[Other: _____] : [unfilled] [FreeTextEntry1] : DM, diabetic neuropathy, renal insuf, hld, ischemiac Cm, CHF, s/p PPM/ICD, GERD/? gastroparesis, PAD, hypercalcemia, hyperparathyroid [de-identified] : Pt is a 77 y/o female with a hx of DM, diabetic neuropathy, renal insuf, hld, htn, ischemiac Cm, CHF, s/p PPM/ICD, GERD/? gastroparesis, PAD, hypercalcemia, hyperparathyroid Here for f/u Following with cardiology and endocrinology.   Has been following with GI.  notes reviewerd.  to get EGD and CT. Has seen neurology.  Needs to get the labs done.  CT with old lacunar infarcts - to see stroke neurology.   [facility] the [facility] - note reviewed.  Referred to PT and to rehab med.   Reports that she saw pod - Was started on herbal supplement for the neuropathic sx.   Has been taking meds w/o issues.  Getting DM meds adjusted with endocrinology.   diet - reported as healthy.  Walking for exercise FS reported as controlled.   Denies any chest pains or palpitations.  + occ dyspnea when walking or when talking.  + some fatigue after walking.  Sleeps on 3 pillows.     Reports some nausea all the time.  no vomiting.  + some epigastric pains.  no diarrhea.  + constipation.  some GERD sx.  Taking the PPi and PM famotidine + some urinary freq and nocturia.   + numbness at the feet.  no noted focal weakness.  + gen weakness.   no rash. + occ dec hearing.   + occ knee pains and hip pains.   Saw neurology for the forgetfuness as noted.    echo 5/23 - EF 59%, diastolic dysfunction, LAE, borderline pulm HTN dexa 8/22 - for f/u 8/24 ophtho - due   Vaccines - pneumo '18 and '19 gets flu vaccine

## 2024-02-04 ENCOUNTER — NON-APPOINTMENT (OUTPATIENT)
Age: 79
End: 2024-02-04

## 2024-02-16 ENCOUNTER — APPOINTMENT (OUTPATIENT)
Dept: NEUROLOGY | Facility: CLINIC | Age: 79
End: 2024-02-16

## 2024-03-04 ENCOUNTER — RESULT REVIEW (OUTPATIENT)
Age: 79
End: 2024-03-04

## 2024-03-04 ENCOUNTER — APPOINTMENT (OUTPATIENT)
Dept: NEUROLOGY | Facility: CLINIC | Age: 79
End: 2024-03-04
Payer: MEDICARE

## 2024-03-04 VITALS
HEIGHT: 62 IN | WEIGHT: 145 LBS | SYSTOLIC BLOOD PRESSURE: 114 MMHG | BODY MASS INDEX: 26.68 KG/M2 | DIASTOLIC BLOOD PRESSURE: 64 MMHG | HEART RATE: 71 BPM

## 2024-03-04 PROCEDURE — 99204 OFFICE O/P NEW MOD 45 MIN: CPT

## 2024-03-04 NOTE — ASSESSMENT
[FreeTextEntry1] : 78 year old woman with DM, HLD, HTN, CHF, hypoparathyroidism, presenting to stroke neurology for incidentally found lacunar strokes on CTH.  On exam, has reduced recall, R leg weakness, and sensory loss on the R arm and leg.  imaging shows multiple chronic appearing lacunar infarcts.  Of note, recent ESR was elevated to 65.   Likely small vessel disease in setting of traditional vascular risk factors.  Will need to repeat the ESR, to assess for GCA as a cause of stroke.  -repeat ESR -send for carotid and transcranial duplex -send for temporal artery ultrasound -continue ASA and crestor 20mg and ezetimibe (had myalgia and elevated LFT's on higher dose of statins) -send for PT -return for follow up with me in 2 months.

## 2024-03-04 NOTE — HISTORY OF PRESENT ILLNESS
[FreeTextEntry1] : 78 year old woman with DM, diabetic neuropathy, renal insuf, HLD, CHF, s/p PPM/ICD, PAD, hyperparathyroid presenting to stroke neurology with her daughter after obtaining a CTH that showed chronic appearing lacunar strokes.    The patient was being seen by Dr. Chua for memory problems that have been ongoing for about 6 years.  She had a CTH that showed white matter hypodensities that are consistent with lacunar strokes.  The patient herself does endorse R sided weakness and sensory loss.  She is not quite sure when these issues began.  Her daughter reports that she has been weak on the R for some years.  The patient says she cares for ADL's completely independently. She dresses, bathes, and toilets independently.  She lives with her daughter.  She does not cook or manage her medications.  She does walk using a cane, but she can only go short distances.  She estimates about 1/2 block maximum distance.    She denies having any language dysfunction, denies any facial weakness.  She endorses chronic shortness of breath, and occasional chest discomfort.  She endoreses swelling in both legs, but worse in the R compared to the L.  She follows with a cardiologist.    PMHx:  DM: novolog, Jardiance.  HTN: takes losartan  HLD: on crestor and ezetimibe.  CHF: on metoprolol and spironolactone.   SHx:  Lives with daughter.  Denies EOTH, drugs, and tobacco use.  Retired school .   ON exam:  GEN; NAD CV: RRR, S1, S2 PULM: CTAb HEENT: temporal tenderness on the R GI: soft, non tender EXTREM; no CCE NEURO:  Awake, alert, oriented to octavia, date, year, president, hospital.  Normal naming, repetition.  Diminished spontaneous fluency.  Recall is 0/3 at 5 minutes.  Pupils 3-2mm, symmetric, full VF's, normal EOM, conjugate gaze, no facial weakness, no dysarthria, tongue midline, palate symmetric.  MOTOR: normal bulk and tone, no drift, 5/5 , biceps, triceps, and deltoids.  4/5 hip flexors, and knee extensors on the R, 5/5 on the L.  SENSORY: diminished pinprick sensation on the R arm and leg.  GAIT: using a cane.  Hemiparetic gait on the R.  COORDINATION: normla FNF REFLEXES: trace to absent BB, Br, triceps, 2+ symmetric patellar, 1+ achilles.   CHT images reviewed: multiple lacunar chronic appearing infarcts, R thalamocapsular, L basal ganglia.

## 2024-03-12 ENCOUNTER — NON-APPOINTMENT (OUTPATIENT)
Age: 79
End: 2024-03-12

## 2024-03-12 ENCOUNTER — APPOINTMENT (OUTPATIENT)
Dept: HEART FAILURE | Facility: CLINIC | Age: 79
End: 2024-03-12
Payer: MEDICARE

## 2024-03-12 VITALS
WEIGHT: 148 LBS | HEIGHT: 62 IN | OXYGEN SATURATION: 99 % | SYSTOLIC BLOOD PRESSURE: 116 MMHG | HEART RATE: 69 BPM | BODY MASS INDEX: 27.23 KG/M2 | DIASTOLIC BLOOD PRESSURE: 75 MMHG

## 2024-03-12 DIAGNOSIS — Z95.810 PRESENCE OF AUTOMATIC (IMPLANTABLE) CARDIAC DEFIBRILLATOR: ICD-10-CM

## 2024-03-12 PROCEDURE — 99214 OFFICE O/P EST MOD 30 MIN: CPT | Mod: 25

## 2024-03-12 PROCEDURE — 93000 ELECTROCARDIOGRAM COMPLETE: CPT

## 2024-03-12 RX ORDER — ASPIRIN 81 MG/1
81 TABLET, CHEWABLE ORAL
Qty: 90 | Refills: 1 | Status: DISCONTINUED | COMMUNITY
Start: 2023-09-28 | End: 2024-03-12

## 2024-03-12 NOTE — HISTORY OF PRESENT ILLNESS
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 Accompanied by daughter, Sanna.   Since last seen 9/2023, pt followed up with PCP and had labs 1/2024 notable for stable CBC, CMP with Na 138, K 5.1 and normal renal function 33/1.27, serum pro , normal TSH, LDL 94 on rosuvastatin 20 mg and ezetimibe and HgA1C 6/6%. LDL 94, CRP normal < 3 but with elevated ESR 65.  She followed up with neuro for incidentally found lacunar strokes on CT of her head with imaging with multiple chronic appearing lacunar infarcts. and ordered carotid and transcranial duplex, temporal artery ultrasound and sent for PT. Found to have reduced recall and some weakness of RUE/RLE. Continued ASA and statin at current dose due to myalgias on higher.  Pt has a history of falls and her daughter states she had a mechanical trip and fall onto her back/buttocks on 3/10/24 but did not need to go to the ED. Still has some lower back discomfort. Otherwise, she is here for clearance today for EGD due to symptoms of nausea and some vomiting after meals and is scheduled at [facility] for 3/18/24. Hasn't had any hospitalizations or ER visits for heart failure. She had a her last TTE 5/2023 with LVEF 56%,  Reports her BP range at home today was 100-116 and states weight has decreased recently at home and is today 145 lbs from 147 lbs last visit.  She has some improvement in her symptoms with taking famotidine.   Pt states she lives at home with her daughter Sanna and her family and lives on the first floor. She sleeps with 2 pillows with no orthopnea. Denies diaphoresis and no syncope.    Reports ET is good but has some leg fatigue with exertion with 1/2 block. States she gets some swelling in her ankles that goes down at night when legs are up.   Takes all medications and follows low sodium/fluid restriction. Denies palpitations/ICD shocks.  Denies chest pian, palpitations, dizziness/LH, syncope and no CRT-D shocks.  Pt had Covid vaccine x 2 including booster without adverse reaction.

## 2024-03-12 NOTE — PHYSICAL EXAM
[No Acute Distress] : no acute distress [Normal Conjunctiva] : normal conjunctiva [Normal S1, S2] : normal S1, S2 [Clear Lung Fields] : clear lung fields [Good Air Entry] : good air entry [Soft] : abdomen soft [No Rash] : no rash [Normal] : alert and oriented, normal memory [de-identified] : Frail, elderly female [de-identified] : JVP 6 cm  [de-identified] : Left chest CRT-D;  [de-identified] : slow, unsteady gait, uses a cane [de-identified] : trace ankle edema bilaterally [de-identified] : forgetful, unsteady on her feet

## 2024-03-12 NOTE — CARDIOLOGY SUMMARY
[de-identified] : 3/12/24 A sensed, BiV paced, rate 67 bpm 9/27/23 - A sensed, BiV paced, rate 71 bpm 3/29/23 - A sensed, BiV paced 1/14/22 A-sensed, BiV paced 7/28/21 Atrial sensed with biventricular paced rhythm  1/27/21 - A-sensed, BiV paced  [de-identified] : \par  6/28/18 - pharmacologic nuclear stress - no ischemia\par    \par  6/15/17 - nuclear pharmacologic stress - EF 53%, small fixed apical inferoseptal pefusion defect (? artifact); no ischemia\par  \par   [de-identified] : 5/26/23 TTE; LVEF 56%, lVEDD 3.5 cm, normal LV systolic function  7/15/22 - normal biventricular function  3/19 - normal LV function and nl RV function   6/28/18 - EF 70%, small LV cavity, nl RV function  3/19 - normal LV function and nl RV function   6/28/18 - EF 70%, small LV cavity, nl RV function  3/19 - normal LV function and nl RV function   6/28/18 - EF 70%, small LV cavity, nl RV function  6/28/18 - EF 70%, small LV cavity, nl RV function    [de-identified] : \par  4/4/16 ([facility]) - pLAD patent stent, dLAD 20% stenosis prior to stent, prox LCx 85% stenosis, distal LCx 75-90% stenosis (small sized), 1st OM 75% stenosis (large sized) s/p PCI, RCA patent, RPDA patent stent, RPL patent stent; EF 70%

## 2024-03-12 NOTE — ASSESSMENT
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 CRT-D, CAD s/p several PCI (last 2016; no h/o MI),  CVA, DM with nephropathy (A1c 6.4 10/19), HTN who is here for follow up and cardiac clearance for EGD on 3/18/24. Currently appears euvolemic and compensated. S/P Mechanical fall on 3/10/24 but did not go to ED and prior fall 8/15/23 and was in ED at [facility]  1. HFrEF - resolved; s/p BiV ICD with EF 56% ( 9/2023) - c/w lasix prn; pt has not taken recently due low B/p at times and no changes in weight - continue losartan 50 mg daily - continue with price 25 mg daily - previously on toprol  mg daily but had stopped it without being aware; will continue toprol at 25 mg daily  - pt is on Jardiance 10 mg daily, will hold for 3 days prior to EGD - 5/26/23 TTE with normal LVEF 56% - will repeat with next visit with Dr. Chambers  2. CAD - stress test w/o ischemia 2018; non-cardiac pain as reproducible  - last PCI 2016; on ASA 81 mg daily - on crestor 40 mg qhs and zetia 10 mg daily; lipid panel 1/23/24 at goal  - continue toprol as above  3. Unsteady gait with a fall 3/10/24 - will follow up with PCP for home  PT   RTC 3 months with Dr. Chambers on the same day as TTE   Discussed with Dr. Chambers. Pt may proceed with EGD on 3/18/24 without any further cardiac testing. She should hold Jardiance for 3 days prior to procedure. Hold ASA as per GI. Pt will follow up in the office.

## 2024-03-13 ENCOUNTER — OUTPATIENT (OUTPATIENT)
Dept: OUTPATIENT SERVICES | Facility: HOSPITAL | Age: 79
LOS: 1 days | End: 2024-03-13

## 2024-03-13 VITALS
HEART RATE: 65 BPM | DIASTOLIC BLOOD PRESSURE: 72 MMHG | WEIGHT: 139.99 LBS | SYSTOLIC BLOOD PRESSURE: 145 MMHG | RESPIRATION RATE: 15 BRPM | HEIGHT: 62 IN | OXYGEN SATURATION: 98 % | TEMPERATURE: 99 F

## 2024-03-13 DIAGNOSIS — E11.9 TYPE 2 DIABETES MELLITUS WITHOUT COMPLICATIONS: ICD-10-CM

## 2024-03-13 DIAGNOSIS — I25.10 ATHEROSCLEROTIC HEART DISEASE OF NATIVE CORONARY ARTERY WITHOUT ANGINA PECTORIS: ICD-10-CM

## 2024-03-13 DIAGNOSIS — Z95.810 PRESENCE OF AUTOMATIC (IMPLANTABLE) CARDIAC DEFIBRILLATOR: Chronic | ICD-10-CM

## 2024-03-13 DIAGNOSIS — R10.9 UNSPECIFIED ABDOMINAL PAIN: ICD-10-CM

## 2024-03-13 DIAGNOSIS — Z95.5 PRESENCE OF CORONARY ANGIOPLASTY IMPLANT AND GRAFT: Chronic | ICD-10-CM

## 2024-03-13 DIAGNOSIS — Z95.0 PRESENCE OF CARDIAC PACEMAKER: Chronic | ICD-10-CM

## 2024-03-13 RX ORDER — PANTOPRAZOLE SODIUM 20 MG/1
1 TABLET, DELAYED RELEASE ORAL
Refills: 0 | DISCHARGE

## 2024-03-13 RX ORDER — CHOLECALCIFEROL (VITAMIN D3) 125 MCG
1 CAPSULE ORAL
Refills: 0 | DISCHARGE

## 2024-03-13 RX ORDER — POLYETHYLENE GLYCOL 3350 17 G/17G
17 POWDER, FOR SOLUTION ORAL
Refills: 0 | DISCHARGE

## 2024-03-13 RX ORDER — EZETIMIBE 10 MG/1
1 TABLET ORAL
Refills: 0 | DISCHARGE

## 2024-03-13 RX ORDER — INSULIN LISPRO 100/ML
6 VIAL (ML) SUBCUTANEOUS
Qty: 0 | Refills: 0 | DISCHARGE

## 2024-03-13 RX ORDER — INSULIN DETEMIR 100/ML (3)
16 INSULIN PEN (ML) SUBCUTANEOUS
Refills: 0 | DISCHARGE

## 2024-03-13 RX ORDER — METOPROLOL TARTRATE 50 MG
1 TABLET ORAL
Qty: 0 | Refills: 0 | DISCHARGE

## 2024-03-13 RX ORDER — INSULIN ASPART 100 [IU]/ML
5 INJECTION, SOLUTION SUBCUTANEOUS
Refills: 0 | DISCHARGE

## 2024-03-13 RX ORDER — EMPAGLIFLOZIN 10 MG/1
1 TABLET, FILM COATED ORAL
Refills: 0 | DISCHARGE

## 2024-03-13 RX ORDER — METOPROLOL TARTRATE 50 MG
1 TABLET ORAL
Refills: 0 | DISCHARGE

## 2024-03-13 RX ORDER — ROSUVASTATIN CALCIUM 5 MG/1
1 TABLET ORAL
Refills: 0 | DISCHARGE

## 2024-03-13 RX ORDER — FAMOTIDINE 10 MG/ML
1 INJECTION INTRAVENOUS
Refills: 0 | DISCHARGE

## 2024-03-13 RX ORDER — LOSARTAN POTASSIUM 100 MG/1
1 TABLET, FILM COATED ORAL
Refills: 0 | DISCHARGE

## 2024-03-13 NOTE — H&P PST ADULT - NSICDXPASTSURGICALHX_GEN_ALL_CORE_FT
PAST SURGICAL HISTORY:  AICD (automatic cardioverter/defibrillator) present     AICD (automatic cardioverter/defibrillator) present     History of heart artery stent

## 2024-03-13 NOTE — H&P PST ADULT - LAST CARDIAC ANGIOGRAM/IMAGING
Cardiac Cath/PCI: 4/4/16 ([facility]) - pLAD patent stent, dLAD 20% stenosis prior to stent, prox LCx 85% stenosis, distal LCx 75-90% stenosis (small sized), 1st OM 75% stenosis (large sized) s/p PCI, RCA patent, RPDA patent stent, RPL patent stent; EF 70%   - noted in cardiology report will request complete report.

## 2024-03-13 NOTE — H&P PST ADULT - PROBLEM SELECTOR PLAN 1
EGD upper Endoscopy.  Pre op and Hibiclens instructions given and explained.  Avoid NSAIDs and OTC supplements.   Patient verbalized understanding  BMP, CBC, hgAIC lab work included in chart

## 2024-03-13 NOTE — H&P PST ADULT - HISTORY OF PRESENT ILLNESS
77yo female C/o nausea  intermittently for 1 1/2 year pt was placed on antiacid medication 4-6 mothns ago pt ataretd to experience nausea with vomiting Pt was referred for abdominal sonogram which was negative.  was reevaluated by GI now present in PST for preop evaluation for scheduled endoscopy. 77yo female C/o nausea  intermittently for 1 1/2 year pt was placed on antiacid medication 4-6 months ago pt stared to experience nausea with vomiting Pt was referred for abdominal sonogram which was negative.  Was reevaluated by GI now present in PST for preop evaluation for scheduled endoscopy.

## 2024-03-13 NOTE — H&P PST ADULT - PROBLEM SELECTOR PLAN 3
pt instructed to continue aspirin unless instructed otherwise by cardiologist and surgeon.    pt will take metoprolol & losartan am of surgery

## 2024-03-13 NOTE — H&P PST ADULT - NSICDXPASTMEDICALHX_GEN_ALL_CORE_FT
PAST MEDICAL HISTORY:  Afib     Bradycardia     CAD (coronary artery disease) s/p stent of mLAD, rPDA, rPLS    Chronic congestive heart failure, unspecified congestive heart failure type s/p AICD    DM (diabetes mellitus)     DM (diabetes mellitus)     HLD (hyperlipidemia)     HLD (hyperlipidemia)     [place] (hard of hearing)     HTN (hypertension)     HTN (hypertension)     Hypertension

## 2024-03-13 NOTE — H&P PST ADULT - CARDIOVASCULAR COMMENTS
AICD palpable left chest wall CHF- dx 2014 no hospitalization since or recent exacerbation- AICD to left chest wall, CAD coronary stent x 3 last placed AD, RCA, RPL- as per cardiologist note placed in chart

## 2024-03-14 NOTE — HISTORY OF PRESENT ILLNESS
[FreeTextEntry1] : 78-year-old woman who is here for initial consultation of memory issues since at least 6 years ago when her daughter took over her finances.  Patient will forget conversations and where she places things.  Patient denies any hallucinations or personality changes.  Patient has a defibrillator and therefore MRI is contraindicated.  Patient denies any mood disorder.  Family history is not available as her mom passed away when she was 9 years of age and her father passed away in his 60s. Negative

## 2024-03-18 NOTE — ASU PATIENT PROFILE, ADULT - FALL HARM RISK - PT AGE POPULATION HIDDEN
Called pt and she expressed understanding. I advised her that we should have the results next week. She stated that she has an apt with us next week. I advised her that hopefully we will have the results then so she can get her results. She expressed understanding.    Adult

## 2024-03-18 NOTE — ASU PATIENT PROFILE, ADULT - NSICDXPASTMEDICALHX_GEN_ALL_CORE_FT
PAST MEDICAL HISTORY:  Afib     Bradycardia     CAD (coronary artery disease) s/p stent of mLAD, rPDA, rPLS    Chronic congestive heart failure, unspecified congestive heart failure type s/p AICD    DM (diabetes mellitus)     DM (diabetes mellitus)     HLD (hyperlipidemia)     HLD (hyperlipidemia)     [place] (hard of hearing)     HTN (hypertension)     HTN (hypertension)     Hypertension

## 2024-03-18 NOTE — ASU PATIENT PROFILE, ADULT - FALL HARM RISK - UNIVERSAL INTERVENTIONS
Bed in lowest position, wheels locked, appropriate side rails in place/Call bell, personal items and telephone in reach/Instruct patient to call for assistance before getting out of bed or chair/Non-slip footwear when patient is out of bed/[place] to call system/Physically safe environment - no spills, clutter or unnecessary equipment/Purposeful Proactive Rounding/Room/bathroom lighting operational, light cord in reach

## 2024-03-19 ENCOUNTER — APPOINTMENT (OUTPATIENT)
Dept: INTERNAL MEDICINE | Facility: HOSPITAL | Age: 79
End: 2024-03-19
Payer: MEDICARE

## 2024-03-19 ENCOUNTER — RESULT REVIEW (OUTPATIENT)
Age: 79
End: 2024-03-19

## 2024-03-19 ENCOUNTER — OUTPATIENT (OUTPATIENT)
Dept: OUTPATIENT SERVICES | Facility: HOSPITAL | Age: 79
LOS: 1 days | Discharge: HOME | End: 2024-03-19
Payer: MEDICARE

## 2024-03-19 VITALS
RESPIRATION RATE: 20 BRPM | DIASTOLIC BLOOD PRESSURE: 55 MMHG | OXYGEN SATURATION: 98 % | HEART RATE: 60 BPM | SYSTOLIC BLOOD PRESSURE: 104 MMHG

## 2024-03-19 VITALS
TEMPERATURE: 98 F | RESPIRATION RATE: 15 BRPM | HEART RATE: 60 BPM | OXYGEN SATURATION: 99 % | DIASTOLIC BLOOD PRESSURE: 52 MMHG | WEIGHT: 145.06 LBS | SYSTOLIC BLOOD PRESSURE: 111 MMHG | HEIGHT: 62 IN

## 2024-03-19 DIAGNOSIS — R10.9 UNSPECIFIED ABDOMINAL PAIN: ICD-10-CM

## 2024-03-19 DIAGNOSIS — Z95.810 PRESENCE OF AUTOMATIC (IMPLANTABLE) CARDIAC DEFIBRILLATOR: Chronic | ICD-10-CM

## 2024-03-19 DIAGNOSIS — Z95.5 PRESENCE OF CORONARY ANGIOPLASTY IMPLANT AND GRAFT: Chronic | ICD-10-CM

## 2024-03-19 LAB — GLUCOSE BLDC GLUCOMTR-MCNC: 111 MG/DL — HIGH (ref 70–99)

## 2024-03-19 PROCEDURE — 43239 EGD BIOPSY SINGLE/MULTIPLE: CPT

## 2024-03-19 PROCEDURE — 88305 TISSUE EXAM BY PATHOLOGIST: CPT | Mod: 26

## 2024-03-19 PROCEDURE — 88342 IMHCHEM/IMCYTCHM 1ST ANTB: CPT | Mod: 26

## 2024-03-21 ENCOUNTER — RX RENEWAL (OUTPATIENT)
Age: 79
End: 2024-03-21

## 2024-03-22 ENCOUNTER — APPOINTMENT (OUTPATIENT)
Dept: NEUROLOGY | Facility: CLINIC | Age: 79
End: 2024-03-22
Payer: MEDICARE

## 2024-03-22 PROCEDURE — 96116 NUBHVL XM PHYS/QHP 1ST HR: CPT

## 2024-03-22 PROCEDURE — 96132 NRPSYC TST EVAL PHYS/QHP 1ST: CPT

## 2024-03-22 PROCEDURE — 96138 PSYCL/NRPSYC TECH 1ST: CPT | Mod: NC

## 2024-03-22 PROCEDURE — 96133 NRPSYC TST EVAL PHYS/QHP EA: CPT

## 2024-03-22 PROCEDURE — 96139 PSYCL/NRPSYC TST TECH EA: CPT | Mod: NC

## 2024-03-25 ENCOUNTER — APPOINTMENT (OUTPATIENT)
Dept: ENDOCRINOLOGY | Facility: CLINIC | Age: 79
End: 2024-03-25
Payer: MEDICARE

## 2024-03-25 VITALS
WEIGHT: 140 LBS | HEIGHT: 62 IN | BODY MASS INDEX: 25.76 KG/M2 | OXYGEN SATURATION: 97 % | HEART RATE: 61 BPM | DIASTOLIC BLOOD PRESSURE: 70 MMHG | SYSTOLIC BLOOD PRESSURE: 116 MMHG

## 2024-03-25 DIAGNOSIS — K31.84 GASTROPARESIS: ICD-10-CM

## 2024-03-25 PROBLEM — H91.90 UNSPECIFIED HEARING LOSS, UNSPECIFIED EAR: Chronic | Status: ACTIVE | Noted: 2024-03-13

## 2024-03-25 PROBLEM — R00.1 BRADYCARDIA, UNSPECIFIED: Chronic | Status: ACTIVE | Noted: 2024-03-13

## 2024-03-25 LAB — SURGICAL PATHOLOGY STUDY: SIGNIFICANT CHANGE UP

## 2024-03-25 PROCEDURE — 99214 OFFICE O/P EST MOD 30 MIN: CPT

## 2024-03-25 PROCEDURE — G2211 COMPLEX E/M VISIT ADD ON: CPT

## 2024-03-25 NOTE — REVIEW OF SYSTEMS
[Fatigue] : fatigue [Nausea] : nausea [Vomiting] : vomiting [Constipation] : constipation [Polyuria] : polyuria [Myalgia] : myalgia  [Pain/Numbness of Digits] : pain/numbness of digits [Swelling] : swelling [Chest Pain] : no chest pain [Palpitations] : no palpitations [Shortness Of Breath] : no shortness of breath [Dysuria] : no dysuria

## 2024-03-25 NOTE — ASSESSMENT
[FreeTextEntry1] : Patient is a 78 year-old woman with history of type 2 diabetes, CHF, CAD, AICD, here to follow-up for type 2 diabetes.  1. Type 2 diabetes A1c 6.6% 1/24 Continue to monitor glucose 4 times daily. Patient reports that she forgot her meter today. A1c currently at goal without hypoglycemia. No side effect with Jardiance 10 mg once daily. However, experiencing episodes of nausea and vomiting Recommend to continue with the above regimen Diabetes regimen: Levemir 16 units at bedtime NovoLog 5 units 3 times daily with meals, 4 units for smaller meal Jardiance 10 mg once daily. Will not increase as having nausea and vomiting and can predispose to eDKA. Advised to hold medication if having poor po intake Patient was given referral to ophthalmology. She is up-to-date with podiatry. Foot exam done last visit within normal limits.   2. Hypertension BP goal <130/80 eGFR 40 mL/min/1.73M2 2 in July 2023 Continue to follow-up with cardiology. Management of BP medication as per cardiology and CHF team. BP medication: Metoprolol 100 mg once daily Losartan 50 mg once daily Spironolactone 25 mg once daily, of note starting SGLT2 inhibitor with Jardiance 10 mg once daily. Will let cardiologist know.  3. Hyperlipidemia LDL goal <70 mg/dL Patient is currently on Zetia 10 mg once daily Rosuvastatin 40 mg once daily  4. Osteoporosis screening Bone density done in August 2022 showed T score of -1.2 in the lumbar L1-L2 region. Femoral neck T score of -0.7, total hip T score -0.8 With risks of major osteoporotic fracture of 3.9% and hip fracture of 0.5%. Had fall in 2/24, no fractures  5. Nausea and vomiting - advised to follow up with GI and PCP, consider gastric emptying for gastroparesis  Case seen and discussed with Dr. Foster.  Howie Nicholas MD Endocrinology Fellow

## 2024-03-25 NOTE — HISTORY OF PRESENT ILLNESS
[FreeTextEntry1] : This is a 78  year old woman w/ DM2, CHF, CAD, + AICD, here for f/u visit for DM2. Patient is here with her family member.  She was seen by CDE 8/2016 for a post hospitalization visit. She had had hospital admission for chest pain, dizziness, and LE weakness. She had elevated BG at home and went to ED. A1c on admission = 13.4%.Cr = 1.94 but improved to 1.02 by d/c. Glucose on admission 900. Pt reports prior to hospital she was taking Lantus 10 daily and Humalog 5/7/7. She was d/c on BASAL/BOLUS insulin.  Last visit, patient was recommended to reduce basal bolus insulin to lower doses secondary to recurrent hypoglycemia. Patient reports that she is taking Levemir 16 units at bedtime NovoLog 5 units 3 times daily with meals Jardiance 10 mg once daily SMBG twice daily: am 90s-160s, before lunch 73-150s, no sugar <70 Never had any urinary tract infections Diet: rice, meat, vegetables, bread, has orange juice, water, glucerna denies soda, snacks Reports walks for exercise Reports weekly nausea and vomiting that was being investigated by PCP  She had optho appt: Reports needs to follow up with ophthalmologist, she had cataract that's removed and she has a history of diabetic retinopathy. Dr. Ramirez, reports would like to see a different ophthalmologist  Saw podiatrist <6 months ago, has neuropathy, no ulcers or lesions on feet  DEXA 8/22 with osteopenia  Abd US done 6/2020 showed simple renal cyst.  History of hypertension, managed by cardiology.  History of CHF, also with cardiology follow-up.  History of hyperlipidemia, currently on Zetia 10 mg daily, rosuvastatin 40mg daily.

## 2024-03-25 NOTE — PHYSICAL EXAM
[Alert] : alert [No Acute Distress] : no acute distress [Normal Sclera/Conjunctiva] : normal sclera/conjunctiva [No Proptosis] : no proptosis [No Lid Lag] : no lid lag [No Neck Mass] : no neck mass was observed [No LAD] : no lymphadenopathy [Thyroid Not Enlarged] : the thyroid was not enlarged [Clear to Auscultation] : lungs were clear to auscultation bilaterally [No Respiratory Distress] : no respiratory distress [Normal S1, S2] : normal S1 and S2 [Regular Rhythm] : with a regular rhythm [Not Tender] : non-tender [Normal Bowel Sounds] : normal bowel sounds [Soft] : abdomen soft [No Stigmata of Cushings Syndrome] : no stigmata of Cushings Syndrome [Normal Strength/Tone] : muscle strength and tone were normal [No Involuntary Movements] : no involuntary movements were seen [No Skin Lesions] : no skin lesions [No Motor Deficits] : the motor exam was normal [No Tremors] : no tremors [Normal Affect] : the affect was normal [Normal Mood] : the mood was normal [Acanthosis Nigricans] : no acanthosis nigricans

## 2024-03-25 NOTE — END OF VISIT
[] : Fellow [FreeTextEntry3] : Patient is a 78-year-old woman with history of type 2 diabetes, CAD, CHF with AICD, on SGLT2 inhibitor with Jardiance 10 mg once daily, basal bolus insulin with Levemir 16 units at bedtime, NovoLog 5 units 3 times daily with meals.  Comes in with her daughter.  For endocrinology follow-up. Overall doing fairly well.  Noted to have some tightly controlled glucose before lunch.  Discussed with patient that if she is eating a lighter meal, to reduce NovoLog by 1 uni also discussed precautions Jardiance if she has any illness. Patient is reporting nausea feeling intermittently.  She had seen by GI and had a recent endoscopy done and was found to be within normal limits. Had never had barium swallow study to rule out gastroparesis which could be a possibility. Complaining of diabetic neuropathy, was recommended to trial alpha lipoic acid by her podiatrist which she will start again. She was found to have osteopenia on bone mineral density in 2022.  Will repeat bone mineral density next visit. For hypertriglyceridemia, continue with rosuvastatin 40 mg once daily and Zetia 10 mg once daily.  LDL close to goal of less than 70 mg/dL for this 78-year-old patient. For blood pressure management, EGFR 40 in July 2023, continue with metoprolol 100 mg daily, losartan 50 mg once daily and spironolactone 25 mg once daily.  Cardiologist will also be following.

## 2024-03-29 ENCOUNTER — APPOINTMENT (OUTPATIENT)
Dept: ELECTROPHYSIOLOGY | Facility: CLINIC | Age: 79
End: 2024-03-29

## 2024-04-07 ENCOUNTER — RX RENEWAL (OUTPATIENT)
Age: 79
End: 2024-04-07

## 2024-04-17 ENCOUNTER — APPOINTMENT (OUTPATIENT)
Dept: NEUROLOGY | Facility: CLINIC | Age: 79
End: 2024-04-17
Payer: MEDICARE

## 2024-04-17 PROCEDURE — 93888 INTRACRANIAL LIMITED STUDY: CPT

## 2024-04-17 PROCEDURE — 93880 EXTRACRANIAL BILAT STUDY: CPT

## 2024-04-21 ENCOUNTER — NON-APPOINTMENT (OUTPATIENT)
Age: 79
End: 2024-04-21

## 2024-04-22 ENCOUNTER — APPOINTMENT (OUTPATIENT)
Dept: ELECTROPHYSIOLOGY | Facility: CLINIC | Age: 79
End: 2024-04-22
Payer: MEDICARE

## 2024-04-22 PROCEDURE — 93295 DEV INTERROG REMOTE 1/2/MLT: CPT

## 2024-04-22 PROCEDURE — 93296 REM INTERROG EVL PM/IDS: CPT

## 2024-04-24 ENCOUNTER — APPOINTMENT (OUTPATIENT)
Dept: NEUROLOGY | Facility: CLINIC | Age: 79
End: 2024-04-24

## 2024-05-02 ENCOUNTER — APPOINTMENT (OUTPATIENT)
Dept: NEUROLOGY | Facility: CLINIC | Age: 79
End: 2024-05-02

## 2024-05-06 ENCOUNTER — RX RENEWAL (OUTPATIENT)
Age: 79
End: 2024-05-06

## 2024-05-06 RX ORDER — FAMOTIDINE 20 MG/1
20 TABLET, FILM COATED ORAL
Qty: 90 | Refills: 1 | Status: ACTIVE | COMMUNITY
Start: 1900-01-01 | End: 1900-01-01

## 2024-05-08 ENCOUNTER — APPOINTMENT (OUTPATIENT)
Dept: INTERNAL MEDICINE | Facility: CLINIC | Age: 79
End: 2024-05-08
Payer: MEDICARE

## 2024-05-08 VITALS
DIASTOLIC BLOOD PRESSURE: 60 MMHG | RESPIRATION RATE: 16 BRPM | SYSTOLIC BLOOD PRESSURE: 124 MMHG | WEIGHT: 150 LBS | HEIGHT: 62 IN | OXYGEN SATURATION: 98 % | HEART RATE: 81 BPM | BODY MASS INDEX: 27.6 KG/M2

## 2024-05-08 DIAGNOSIS — K31.89 TYPE 2 DIABETES MELLITUS WITH OTHER SPECIFIED COMPLICATION: ICD-10-CM

## 2024-05-08 DIAGNOSIS — E83.52 HYPERCALCEMIA: ICD-10-CM

## 2024-05-08 DIAGNOSIS — M54.12 RADICULOPATHY, CERVICAL REGION: ICD-10-CM

## 2024-05-08 DIAGNOSIS — I73.9 PERIPHERAL VASCULAR DISEASE, UNSPECIFIED: ICD-10-CM

## 2024-05-08 DIAGNOSIS — I63.81 OTHER CEREBRAL INFARCTION DUE TO OCCLUSION OR STENOSIS OF SMALL ARTERY: ICD-10-CM

## 2024-05-08 DIAGNOSIS — E11.40 TYPE 2 DIABETES MELLITUS WITH DIABETIC NEUROPATHY, UNSPECIFIED: ICD-10-CM

## 2024-05-08 DIAGNOSIS — N18.2 CHRONIC KIDNEY DISEASE, STAGE 2 (MILD): ICD-10-CM

## 2024-05-08 DIAGNOSIS — R79.89 OTHER SPECIFIED ABNORMAL FINDINGS OF BLOOD CHEMISTRY: ICD-10-CM

## 2024-05-08 DIAGNOSIS — K59.09 OTHER CONSTIPATION: ICD-10-CM

## 2024-05-08 DIAGNOSIS — K21.9 GASTRO-ESOPHAGEAL REFLUX DISEASE W/OUT ESOPHAGITIS: ICD-10-CM

## 2024-05-08 DIAGNOSIS — R26.89 OTHER ABNORMALITIES OF GAIT AND MOBILITY: ICD-10-CM

## 2024-05-08 DIAGNOSIS — E11.9 TYPE 2 DIABETES MELLITUS W/OUT COMPLICATIONS: ICD-10-CM

## 2024-05-08 DIAGNOSIS — I10 ESSENTIAL (PRIMARY) HYPERTENSION: ICD-10-CM

## 2024-05-08 DIAGNOSIS — E78.5 HYPERLIPIDEMIA, UNSPECIFIED: ICD-10-CM

## 2024-05-08 DIAGNOSIS — E11.69 TYPE 2 DIABETES MELLITUS WITH OTHER SPECIFIED COMPLICATION: ICD-10-CM

## 2024-05-08 DIAGNOSIS — M48.061 SPINAL STENOSIS, LUMBAR REGION WITHOUT NEUROGENIC CLAUDICATION: ICD-10-CM

## 2024-05-08 LAB
25(OH)D3 SERPL-MCNC: 40.8 NG/ML
ALBUMIN SERPL ELPH-MCNC: 4.4 G/DL
ALP BLD-CCNC: 78 U/L
ALT SERPL-CCNC: 34 U/L
AMPA-R ABCBA: NEGATIVE
AMPHIPHYSIN IGG TITR SER IF: NEGATIVE
ANA PAT FLD IF-IMP: ABNORMAL
ANA SER IF-ACNC: ABNORMAL
ANION GAP SERPL CALC-SCNC: 10 MMOL/L
ANNOTATION COMMENT IMP: NORMAL
AST SERPL-CCNC: 38 U/L
BASOPHILS # BLD AUTO: 0.06 K/UL
BASOPHILS NFR BLD AUTO: 1 %
BILIRUB SERPL-MCNC: 0.5 MG/DL
BUN SERPL-MCNC: 33 MG/DL
CALCIUM SERPL-MCNC: 10.1 MG/DL
CALCIUM SERPL-MCNC: 10.1 MG/DL
CASPR2-IGG CBA, S: NEGATIVE
CHLORIDE SERPL-SCNC: 103 MMOL/L
CHOLEST SERPL-MCNC: 163 MG/DL
CO2 SERPL-SCNC: 25 MMOL/L
CREAT SERPL-MCNC: 1.27 MG/DL
CRP SERPL-MCNC: <3 MG/L
CV2 IGG TITR SER: NEGATIVE
EGFR: 43 ML/MIN/1.73M2
EOSINOPHIL # BLD AUTO: 0.09 K/UL
EOSINOPHIL NFR BLD AUTO: 1.4 %
ERYTHROCYTE [SEDIMENTATION RATE] IN BLOOD BY WESTERGREN METHOD: 65 MM/HR
ESTIMATED AVERAGE GLUCOSE: 143 MG/DL
FOLATE SERPL-MCNC: 17.8 NG/ML
FRUCTOSAMINE SERPL-MCNC: 293 UMOL/L
GABA-B ABCBA: NEGATIVE
GAD65 AB SER-MCNC: 0 NMOL/L
GLIAL NUC TYPE 1 AB TITR SER: NEGATIVE
GLUCOSE SERPL-MCNC: 124 MG/DL
HBA1C MFR BLD HPLC: 6.6 %
HCT VFR BLD CALC: 45 %
HDLC SERPL-MCNC: 55 MG/DL
HGB BLD-MCNC: 14.4 G/DL
HU1 AB TITR SER: NEGATIVE
HU2 AB TITR SER IF: NEGATIVE
HU3 AB TITR SER: NEGATIVE
IGLON5 IFA, S: NEGATIVE
IMM GRANULOCYTES NFR BLD AUTO: 0.3 %
IMMUNOLOGIST REVIEW: NORMAL
LDLC SERPL CALC-MCNC: 94 MG/DL
LGI1-IGG CBA, S: NEGATIVE
LYMPHOCYTES # BLD AUTO: 1.35 K/UL
LYMPHOCYTES NFR BLD AUTO: 21.6 %
MAN DIFF?: NORMAL
MCHC RBC-ENTMCNC: 32 GM/DL
MCHC RBC-ENTMCNC: 32.2 PG
MCV RBC AUTO: 100.7 FL
MONOCYTES # BLD AUTO: 0.71 K/UL
MONOCYTES NFR BLD AUTO: 11.4 %
NEUTROPHILS # BLD AUTO: 4.01 K/UL
NEUTROPHILS NFR BLD AUTO: 64.3 %
NIF IFA, S: NEGATIVE
NMDA-R ABCBA: NEGATIVE
NONHDLC SERPL-MCNC: 108 MG/DL
NT-PROBNP SERPL-MCNC: 123 PG/ML
PARATHYROID HORMONE INTACT: 50 PG/ML
PCA-1 AB TITR SER: NEGATIVE
PCA-2 AB TITR SER: NEGATIVE
PCA-TR AB TITR SER: NEGATIVE
PLATELET # BLD AUTO: 149 K/UL
POTASSIUM SERPL-SCNC: 5.1 MMOL/L
PROT SERPL-MCNC: 7.7 G/DL
RBC # BLD: 4.47 M/UL
RBC # FLD: 14 %
SODIUM SERPL-SCNC: 138 MMOL/L
T PALLIDUM AB SER QL IA: POSITIVE
THYROGLOB AB SERPL-ACNC: <20 IU/ML
THYROPEROXIDASE AB SERPL IA-ACNC: <10 IU/ML
TRIGL SERPL-MCNC: 77 MG/DL
TSH SERPL-ACNC: 2.76 UIU/ML
VIT B1 SERPL-MCNC: 160.7 NMOL/L
VIT B12 SERPL-MCNC: 1435 PG/ML
WBC # FLD AUTO: 6.24 K/UL

## 2024-05-08 PROCEDURE — 36415 COLL VENOUS BLD VENIPUNCTURE: CPT

## 2024-05-08 PROCEDURE — G2211 COMPLEX E/M VISIT ADD ON: CPT

## 2024-05-08 PROCEDURE — 99214 OFFICE O/P EST MOD 30 MIN: CPT

## 2024-05-08 NOTE — REVIEW OF SYSTEMS
[Fatigue] : fatigue [Hearing Loss] : hearing loss [Dyspnea on Exertion] : dyspnea on exertion [Abdominal Pain] : abdominal pain [Nausea] : nausea [Constipation] : constipation [Nocturia] : nocturia [Frequency] : frequency [Joint Pain] : joint pain [Muscle Weakness] : muscle weakness [Memory Loss] : memory loss [Unsteady Walking] : ataxia [Negative] : Heme/Lymph [Fever] : no fever [Chills] : no chills [Hot Flashes] : no hot flashes [Night Sweats] : no night sweats [Recent Change In Weight] : ~T no recent weight change [Shortness Of Breath] : no shortness of breath [Wheezing] : no wheezing [Cough] : no cough [Diarrhea] : diarrhea [Vomiting] : no vomiting [Heartburn] : no heartburn [Melena] : no melena [Joint Stiffness] : no joint stiffness [Joint Swelling] : no joint swelling [Muscle Pain] : no muscle pain [Back Pain] : no back pain [Headache] : no headache [Dizziness] : no dizziness [Fainting] : no fainting [FreeTextEntry5] : sleeps on 3 pillows [de-identified] : walks with cane or walker.

## 2024-05-08 NOTE — HEALTH RISK ASSESSMENT
[No] : In the past 12 months have you used drugs other than those required for medical reasons? No [0] : 2) Feeling down, depressed, or hopeless: Not at all (0) [PHQ-2 Negative - No further assessment needed] : PHQ-2 Negative - No further assessment needed [Never] : Never [Audit-CScore] : 0 [[id number]] : 0

## 2024-05-08 NOTE — COUNSELING
[Encouraged to increase physical activity] : Encouraged to increase physical activity [Decrease Portions] : decrease portions [None] : None [Good understanding] : Patient has a good understanding of lifestyle changes and steps needed to achieve self management goal [Fall prevention counseling provided] : Fall prevention counseling provided [Adequate lighting] : Adequate lighting [No throw rugs] : No throw rugs [Use proper foot wear] : Use proper foot wear [Use recommended devices] : Use recommended devices

## 2024-05-08 NOTE — PHYSICAL EXAM
[No Acute Distress] : no acute distress [Well Nourished] : well nourished [Well Developed] : well developed [Well-Appearing] : well-appearing [Normal Voice/Communication] : normal voice/communication [Normal Sclera/Conjunctiva] : normal sclera/conjunctiva [PERRL] : pupils equal round and reactive to light [EOMI] : extraocular movements intact [Normal Outer Ear/Nose] : the outer ears and nose were normal in appearance [Normal Oropharynx] : the oropharynx was normal [No JVD] : no jugular venous distention [No Lymphadenopathy] : no lymphadenopathy [Supple] : supple [Thyroid Normal, No Nodules] : the thyroid was normal and there were no nodules present [No Respiratory Distress] : no respiratory distress  [No Accessory Muscle Use] : no accessory muscle use [Clear to Auscultation] : lungs were clear to auscultation bilaterally [Normal Rate] : normal rate  [Regular Rhythm] : with a regular rhythm [Normal S1, S2] : normal S1 and S2 [No Murmur] : no murmur heard [No Carotid Bruits] : no carotid bruits [No Abdominal Bruit] : a ~M bruit was not heard ~T in the abdomen [No Edema] : there was no peripheral edema [No Palpable Aorta] : no palpable aorta [Soft] : abdomen soft [Non-distended] : non-distended [No Masses] : no abdominal mass palpated [No HSM] : no HSM [Normal Bowel Sounds] : normal bowel sounds [Normal Posterior Cervical Nodes] : no posterior cervical lymphadenopathy [Normal Anterior Cervical Nodes] : no anterior cervical lymphadenopathy [No CVA Tenderness] : no CVA  tenderness [No Spinal Tenderness] : no spinal tenderness [No Joint Swelling] : no joint swelling [Grossly Normal Strength/Tone] : grossly normal strength/tone [No Rash] : no rash [Coordination Grossly Intact] : coordination grossly intact [No Focal Deficits] : no focal deficits [Speech Grossly Normal] : speech grossly normal [Alert and Oriented x3] : oriented to person, place, and time [Normal Mood] : the mood was normal [de-identified] : dec DP pulses. [de-identified] : reports milf epigastric and ruq tenderness.  no rebound or gaurding. [de-identified] : unsteady gait, with cane [de-identified] : A&O, interacting, appropriate, answering questions.  Appropriate.

## 2024-05-09 LAB
ALBUMIN SERPL ELPH-MCNC: 4.3 G/DL
ALP BLD-CCNC: 73 U/L
ALT SERPL-CCNC: 40 U/L
ANION GAP SERPL CALC-SCNC: 11 MMOL/L
APPEARANCE: CLEAR
AST SERPL-CCNC: 44 U/L
BACTERIA: NEGATIVE /HPF
BASOPHILS # BLD AUTO: 0.04 K/UL
BASOPHILS NFR BLD AUTO: 0.6 %
BILIRUB SERPL-MCNC: 0.5 MG/DL
BILIRUBIN URINE: NEGATIVE
BLOOD URINE: NEGATIVE
BUN SERPL-MCNC: 31 MG/DL
CALCIUM SERPL-MCNC: 10.1 MG/DL
CALCIUM SERPL-MCNC: 10.1 MG/DL
CAST: 0 /LPF
CHLORIDE SERPL-SCNC: 103 MMOL/L
CHOLEST SERPL-MCNC: 150 MG/DL
CO2 SERPL-SCNC: 25 MMOL/L
COLOR: YELLOW
CREAT SERPL-MCNC: 1.28 MG/DL
CRP SERPL-MCNC: <3 MG/L
EGFR: 42 ML/MIN/1.73M2
EOSINOPHIL # BLD AUTO: 0.03 K/UL
EOSINOPHIL NFR BLD AUTO: 0.4 %
EPITHELIAL CELLS: 2 /HPF
ERYTHROCYTE [SEDIMENTATION RATE] IN BLOOD BY WESTERGREN METHOD: 49 MM/HR
ESTIMATED AVERAGE GLUCOSE: 146 MG/DL
GLUCOSE QUALITATIVE U: >=1000 MG/DL
GLUCOSE SERPL-MCNC: 100 MG/DL
HBA1C MFR BLD HPLC: 6.7 %
HCT VFR BLD CALC: 46.1 %
HDLC SERPL-MCNC: 52 MG/DL
HGB BLD-MCNC: 14.7 G/DL
IMM GRANULOCYTES NFR BLD AUTO: 0.3 %
KETONES URINE: NEGATIVE MG/DL
LDLC SERPL CALC-MCNC: 85 MG/DL
LEUKOCYTE ESTERASE URINE: NEGATIVE
LYMPHOCYTES # BLD AUTO: 1.83 K/UL
LYMPHOCYTES NFR BLD AUTO: 25.6 %
MAGNESIUM SERPL-MCNC: 2.3 MG/DL
MAN DIFF?: NORMAL
MCHC RBC-ENTMCNC: 31.9 GM/DL
MCHC RBC-ENTMCNC: 32 PG
MCV RBC AUTO: 100.4 FL
MICROSCOPIC-UA: NORMAL
MONOCYTES # BLD AUTO: 0.85 K/UL
MONOCYTES NFR BLD AUTO: 11.9 %
NEUTROPHILS # BLD AUTO: 4.37 K/UL
NEUTROPHILS NFR BLD AUTO: 61.2 %
NITRITE URINE: NEGATIVE
NONHDLC SERPL-MCNC: 98 MG/DL
PARATHYROID HORMONE INTACT: 49 PG/ML
PH URINE: 6
PLATELET # BLD AUTO: 150 K/UL
POTASSIUM SERPL-SCNC: 5.3 MMOL/L
PROT SERPL-MCNC: 7.4 G/DL
PROTEIN URINE: NEGATIVE MG/DL
RBC # BLD: 4.59 M/UL
RBC # FLD: 14.6 %
RED BLOOD CELLS URINE: 1 /HPF
SODIUM SERPL-SCNC: 139 MMOL/L
SPECIFIC GRAVITY URINE: 1.03
TRIGL SERPL-MCNC: 61 MG/DL
UROBILINOGEN URINE: 0.2 MG/DL
WBC # FLD AUTO: 7.14 K/UL
WHITE BLOOD CELLS URINE: 0 /HPF

## 2024-05-10 LAB
CREAT SPEC-SCNC: 74 MG/DL
MICROALBUMIN 24H UR DL<=1MG/L-MCNC: <1.2 MG/DL
MICROALBUMIN/CREAT 24H UR-RTO: NORMAL MG/G

## 2024-05-17 ENCOUNTER — OUTPATIENT (OUTPATIENT)
Dept: OUTPATIENT SERVICES | Facility: HOSPITAL | Age: 79
LOS: 1 days | End: 2024-05-17
Payer: MEDICARE

## 2024-05-17 ENCOUNTER — APPOINTMENT (OUTPATIENT)
Dept: HEART FAILURE | Facility: CLINIC | Age: 79
End: 2024-05-17
Payer: MEDICARE

## 2024-05-17 ENCOUNTER — APPOINTMENT (OUTPATIENT)
Dept: CV DIAGNOSITCS | Facility: HOSPITAL | Age: 79
End: 2024-05-17

## 2024-05-17 ENCOUNTER — RESULT REVIEW (OUTPATIENT)
Age: 79
End: 2024-05-17

## 2024-05-17 ENCOUNTER — NON-APPOINTMENT (OUTPATIENT)
Age: 79
End: 2024-05-17

## 2024-05-17 VITALS
OXYGEN SATURATION: 100 % | WEIGHT: 150 LBS | HEART RATE: 60 BPM | SYSTOLIC BLOOD PRESSURE: 129 MMHG | DIASTOLIC BLOOD PRESSURE: 79 MMHG | TEMPERATURE: 98 F | BODY MASS INDEX: 27.6 KG/M2 | HEIGHT: 62 IN

## 2024-05-17 DIAGNOSIS — I50.22 CHRONIC SYSTOLIC (CONGESTIVE) HEART FAILURE: ICD-10-CM

## 2024-05-17 DIAGNOSIS — Z95.5 PRESENCE OF CORONARY ANGIOPLASTY IMPLANT AND GRAFT: Chronic | ICD-10-CM

## 2024-05-17 DIAGNOSIS — Z95.810 PRESENCE OF AUTOMATIC (IMPLANTABLE) CARDIAC DEFIBRILLATOR: Chronic | ICD-10-CM

## 2024-05-17 PROCEDURE — 93306 TTE W/DOPPLER COMPLETE: CPT | Mod: 26

## 2024-05-17 PROCEDURE — G2211 COMPLEX E/M VISIT ADD ON: CPT

## 2024-05-17 PROCEDURE — 93000 ELECTROCARDIOGRAM COMPLETE: CPT

## 2024-05-17 PROCEDURE — 99214 OFFICE O/P EST MOD 30 MIN: CPT

## 2024-05-17 RX ORDER — LOSARTAN POTASSIUM 50 MG/1
50 TABLET, FILM COATED ORAL
Qty: 90 | Refills: 0 | Status: ACTIVE | COMMUNITY
Start: 2019-07-30 | End: 1900-01-01

## 2024-05-17 RX ORDER — SPIRONOLACTONE 25 MG/1
25 TABLET ORAL
Qty: 90 | Refills: 1 | Status: ACTIVE | COMMUNITY
Start: 2024-05-17 | End: 1900-01-01

## 2024-05-17 RX ORDER — EZETIMIBE 10 MG/1
10 TABLET ORAL
Qty: 90 | Refills: 1 | Status: ACTIVE | COMMUNITY
Start: 2018-11-11 | End: 1900-01-01

## 2024-05-17 RX ORDER — METOPROLOL SUCCINATE 25 MG/1
25 TABLET, EXTENDED RELEASE ORAL
Qty: 90 | Refills: 1 | Status: ACTIVE | COMMUNITY
Start: 2018-03-23 | End: 1900-01-01

## 2024-05-17 RX ORDER — ASPIRIN 81 MG/1
81 TABLET, CHEWABLE ORAL
Qty: 90 | Refills: 1 | Status: ACTIVE | COMMUNITY
Start: 2018-03-08 | End: 1900-01-01

## 2024-05-17 RX ORDER — EMPAGLIFLOZIN 10 MG/1
10 TABLET, FILM COATED ORAL
Qty: 90 | Refills: 2 | Status: ACTIVE | COMMUNITY
Start: 2022-08-16 | End: 1900-01-01

## 2024-05-17 RX ORDER — ROSUVASTATIN CALCIUM 20 MG/1
20 TABLET, FILM COATED ORAL
Qty: 90 | Refills: 1 | Status: ACTIVE | COMMUNITY
Start: 2017-09-07 | End: 1900-01-01

## 2024-05-17 NOTE — ASSESSMENT
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 CRT-D, CAD s/p several PCI (last 2016; no h/o MI), chronic CVA, DM with nephropathy (A1c 6.4 10/19), HTN who is here for follow up. Currently appears euvolemic and compensated.   1. HFrEF - resolved; s/p BiV ICD with EF 56% ( 9/2023) - c/w lasix prn; pt has not taken recently due low B/p at times and no changes in weight - continue losartan 50 mg daily - continue with price 25 mg daily - continue toprol at 25 mg daily  - pt is on Jardiance 10 mg daily  2. CAD - stress test w/o ischemia 2018; non-cardiac pain as reproducible  - last PCI 2016; on ASA 81 mg daily - on crestor 40 mg qhs and zetia 10 mg daily; lipid panel 1/23/24 at goal  - continue toprol as above  3. Unsteady gait with a fall 3/10/24 - will follow up with PCP for home  PT  RTC 6 months with me

## 2024-05-17 NOTE — HISTORY OF PRESENT ILLNESS
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 CRT-D, CAD s/p several PCI (last 2016; no h/o MI), chronic CVA, DM with nephropathy (A1c 6.4 10/19), HTN who is here for follow up. Accompanied by daughter, Sanna.   Since last seen hasn't been to ER or hospital.   Pt has a history of falls and her daughter states she had a mechanical trip and fall onto her back/buttocks in February but did not need to go to the ED. Still has some lower back discomfort.   Reports her BP range at home has been 100-116 and states weight has decreased recently at home and is today 145 lbs from 147 lbs last visit.  She has some improvement in her symptoms with taking famotidine.   Has occasional chest discomfort with laying down. Had endoscopy 3/19/24 which showed medium sized hiatal hernia and reflux esophagitis. Was told perhaps spironolactone was contributing to her symptoms and had stopped it for 1 week with ? improvement but has since resumed w/o issues.   Reports ET is good but has some leg fatigue with exertion with 1/2 block but doesn't push herself. States she gets some swelling in her ankles that goes down at night when legs are up.   Takes all medications and follows low sodium/fluid restriction. Denies palpitations/ICD shocks.  Denies chest pian, palpitations, dizziness/LH, syncope and no CRT-D shocks.  Pt had Covid vaccine x 2 including booster without adverse reaction.

## 2024-05-17 NOTE — CARDIOLOGY SUMMARY
[de-identified] : 5/18/24 A sensed, BiV paced 3/12/24 A sensed, BiV paced, rate 67 bpm 9/27/23 - A sensed, BiV paced, rate 71 bpm 3/29/23 - A sensed, BiV paced 1/14/22 A-sensed, BiV paced 7/28/21 Atrial sensed with biventricular paced rhythm  1/27/21 - A-sensed, BiV paced  [de-identified] : 5/17/24 - nl LV function, mild TR, PASP 25, nl RV size/function, IVC small   5/26/23 TTE; LVEF 56%, lVEDD 3.5 cm, normal LV systolic function  7/15/22 - normal biventricular function  3/19 - normal LV function and nl RV function   6/28/18 - EF 70%, small LV cavity, nl RV function  3/19 - normal LV function and nl RV function   6/28/18 - EF 70%, small LV cavity, nl RV function  3/19 - normal LV function and nl RV function   6/28/18 - EF 70%, small LV cavity, nl RV function  6/28/18 - EF 70%, small LV cavity, nl RV function    [de-identified] : \par  6/28/18 - pharmacologic nuclear stress - no ischemia\par    \par  6/15/17 - nuclear pharmacologic stress - EF 53%, small fixed apical inferoseptal pefusion defect (? artifact); no ischemia\par  \par   [de-identified] : \par  4/4/16 ([facility]) - pLAD patent stent, dLAD 20% stenosis prior to stent, prox LCx 85% stenosis, distal LCx 75-90% stenosis (small sized), 1st OM 75% stenosis (large sized) s/p PCI, RCA patent, RPDA patent stent, RPL patent stent; EF 70%

## 2024-05-17 NOTE — PHYSICAL EXAM
[No Acute Distress] : no acute distress [Normal Conjunctiva] : normal conjunctiva [Normal S1, S2] : normal S1, S2 [Clear Lung Fields] : clear lung fields [Soft] : abdomen soft [Good Air Entry] : good air entry [No Rash] : no rash [Normal] : alert and oriented, normal memory [de-identified] : Frail, elderly female [de-identified] : JVP 6 cm  [de-identified] : Left chest CRT-D; tenderness to palpation [de-identified] : slow, unsteady gait, uses a cane [de-identified] : trace ankle edema bilaterally [de-identified] : forgetful, unsteady on her feet

## 2024-05-21 ENCOUNTER — RX RENEWAL (OUTPATIENT)
Age: 79
End: 2024-05-21

## 2024-05-21 RX ORDER — PANTOPRAZOLE 40 MG/1
40 TABLET, DELAYED RELEASE ORAL TWICE DAILY
Qty: 180 | Refills: 1 | Status: ACTIVE | COMMUNITY
Start: 1900-01-01 | End: 1900-01-01

## 2024-05-27 NOTE — ED ADULT NURSE NOTE - CAS EDP DISCH TYPE
Subjective   Patient ID: Glo Topete is a 55 y.o. female who presents for Hyperlipidemia (Blood work done. ).    HPI   56 yo female presents for f/u.      Last seen 5/2023    Recent labs   Lipids, tsh, t4, cbc, cmp wnl      Hx HTN- on HCTZ 25mg.   has been checking BP at home and has been good   110s/70s    Hx hypothyroidism- on levothyroxine 125mcg;   No neck changes.    BMI 38  Trying to stay active exercise;   tries to eat a healthy diet    last pap 1/2021 was neg with neg HPV;  no hx of abnormal paps.  LMP at 38 yo.   Did have some postmenopausal bleeding a several yrs ago and dr Ruff ordered a pelvic US but she never had done. she had another recent episode of vaginal bleeding in nov 2019- very light and just had one episode of it. no pelvic pain or vaginal changes. did not do US or f/u with GYN as recommended at that time  did have a pelvic US 3/2021 and did not show any endometrial thickening; could not tolerate transvaginal US;    no PMB since     does regular self breast exams. no changes.  10/2023  mammo  -neg  mom and mat cousin with hx breast CA     has not had a Screening colonoscopy  2/2021 Cologuard - neg   Prefers to do screening colonoscopy at this time.  Sister with hx of precancerous polyps  Denies any changes in bowel habits, abdominal pain, diarrhea, constipation, blood in stool, melena.         Tetanus-2008- due for booster- will get at health dept due to insurance  shingles vaccine- has not had- will get at health dept  flu shot-defers  covid shots-had     She denies any chest pains, palpitations, edema, shortness of breath, abdominal pains, reflux, nausea, vomiting, diarrhea, constipation, blood in stool, melena.      had sleep study 2/2020- showed severe sleep apnea- on CPAP and tolerating well  dr benitez- hasn't seen him     Used to wear glasses prn;  due to see optho  Due to see dentist   No mole changes              Review of Systems  ROS as stated in HPI    Objective   /72    "Pulse 78   Temp 36.8 °C (98.3 °F)   Ht 1.6 m (5' 3\")   Wt 99.8 kg (220 lb)   BMI 38.97 kg/m²     Physical Exam  Constitutional: A&O; NAD  HEENT: conjunctiva normal. EOMI; PERRLA; lids normal; TM's clear;   Neck: supple; No lymphadenopathy   Heart: RRR     Lungs: CTA bilateral   Abd: Soft, Nontender, Nondistended  Ext:  No edema;    Neuro: No gross neuro deficits     Psych: Judgment, orientation, mood, affect, insight wnl   Assessment/Plan   Problem List Items Addressed This Visit             ICD-10-CM    HLD (hyperlipidemia) E78.5    Hypothyroidism E03.9    Hypertension - Primary I10     Other Visit Diagnoses         Codes    Colon cancer screening     Z12.11    Relevant Orders    Colonoscopy Screening; Average Risk Patient    Breast cancer screening by mammogram     Z12.31    Relevant Orders    BI mammo bilateral screening tomosynthesis (Completed)          1/2021 pap neg with neg HPV.   Pt defers pap, pelvic, breast exam today  10/2023  mammogram recheck in oct   2/2021 cologuard- neg;   screening colonoscopy ordered  healthy lifestyle-diet, exercise, wt loss   Reviewed recent labs   cont to monitor BP  had sleep study; fu with dr benitez; on CPAP  will contact health dept for tetanus/shingrix     Fu 1 yr or sooner if needed     " Home

## 2024-05-29 ENCOUNTER — RX RENEWAL (OUTPATIENT)
Age: 79
End: 2024-05-29

## 2024-05-31 ENCOUNTER — APPOINTMENT (OUTPATIENT)
Dept: VASCULAR SURGERY | Facility: CLINIC | Age: 79
End: 2024-05-31

## 2024-06-05 ENCOUNTER — APPOINTMENT (OUTPATIENT)
Dept: INFECTIOUS DISEASE | Facility: CLINIC | Age: 79
End: 2024-06-05
Payer: MEDICARE

## 2024-06-05 VITALS
SYSTOLIC BLOOD PRESSURE: 100 MMHG | HEART RATE: 60 BPM | WEIGHT: 145 LBS | TEMPERATURE: 98.6 F | OXYGEN SATURATION: 97 % | DIASTOLIC BLOOD PRESSURE: 60 MMHG | BODY MASS INDEX: 26.68 KG/M2 | HEIGHT: 62 IN

## 2024-06-05 DIAGNOSIS — A53.0 LATENT SYPHILIS, UNSPECIFIED AS EARLY OR LATE: ICD-10-CM

## 2024-06-05 DIAGNOSIS — R41.3 OTHER AMNESIA: ICD-10-CM

## 2024-06-05 LAB
ALBUMIN SERPL ELPH-MCNC: 4.3 G/DL
ALP BLD-CCNC: 75 U/L
ALT SERPL-CCNC: 45 U/L
ANION GAP SERPL CALC-SCNC: 11 MMOL/L
APTT BLD: 32.5 SEC
AST SERPL-CCNC: 46 U/L
BASOPHILS # BLD AUTO: 0.04 K/UL
BASOPHILS NFR BLD AUTO: 0.7 %
BILIRUB SERPL-MCNC: 0.5 MG/DL
BUN SERPL-MCNC: 30 MG/DL
CALCIUM SERPL-MCNC: 10 MG/DL
CHLORIDE SERPL-SCNC: 104 MMOL/L
CO2 SERPL-SCNC: 26 MMOL/L
CREAT SERPL-MCNC: 1.24 MG/DL
EGFR: 44 ML/MIN/1.73M2
EOSINOPHIL # BLD AUTO: 0.06 K/UL
EOSINOPHIL NFR BLD AUTO: 1 %
HCT VFR BLD CALC: 44.1 %
HGB BLD-MCNC: 14.2 G/DL
IMM GRANULOCYTES NFR BLD AUTO: 0.3 %
INR PPP: 1.01 RATIO
LYMPHOCYTES # BLD AUTO: 1.63 K/UL
LYMPHOCYTES NFR BLD AUTO: 28.5 %
MAN DIFF?: NORMAL
MCHC RBC-ENTMCNC: 31.8 PG
MCHC RBC-ENTMCNC: 32.2 GM/DL
MCV RBC AUTO: 98.7 FL
MONOCYTES # BLD AUTO: 0.75 K/UL
MONOCYTES NFR BLD AUTO: 13.1 %
NEUTROPHILS # BLD AUTO: 3.22 K/UL
NEUTROPHILS NFR BLD AUTO: 56.4 %
PLATELET # BLD AUTO: 136 K/UL
POTASSIUM SERPL-SCNC: 4.4 MMOL/L
PROT SERPL-MCNC: 7.7 G/DL
PT BLD: 11.4 SEC
RBC # BLD: 4.47 M/UL
RBC # FLD: 14.4 %
SODIUM SERPL-SCNC: 141 MMOL/L
WBC # FLD AUTO: 5.72 K/UL

## 2024-06-05 PROCEDURE — 99202 OFFICE O/P NEW SF 15 MIN: CPT

## 2024-06-05 NOTE — CONSULT LETTER
[Dear  ___] : Dear  [unfilled], [( Thank you for referring [unfilled] for consultation for _____ )] : Thank you for referring [unfilled] for consultation for [unfilled] [Please see my note below.] : Please see my note below. [FreeTextEntry2] : Dr. Mook Krishnan [FreeTextEntry3] : Thank you for allowing me to participate in the care of this patient.  Please feel free to contact me with any questions.  Sincerely,  Michael I. Oppenheim, MD  [DrGregory  ___] : Dr. CHRISTIANSON [DrGregory ___] : Dr. CHRISTIANSON

## 2024-06-05 NOTE — PHYSICAL EXAM
[General Appearance - Alert] : alert [General Appearance - In No Acute Distress] : in no acute distress [General Appearance - Well-Appearing] : healthy appearing [Sclera] : the sclera and conjunctiva were normal [PERRL With Normal Accommodation] : pupils were equal in size, round, reactive to light [Extraocular Movements] : extraocular movements were intact [Outer Ear] : the ears and nose were normal in appearance [Oropharynx] : the oropharynx was normal with no thrush [Neck Appearance] : the appearance of the neck was normal [Neck Cervical Mass (___cm)] : no neck mass was observed [Jugular Venous Distention Increased] : there was no jugular-venous distention [Auscultation Breath Sounds / Voice Sounds] : lungs were clear to auscultation bilaterally [Heart Rate And Rhythm] : heart rate was normal and rhythm regular [Heart Sounds] : normal S1 and S2 [Heart Sounds Gallop] : no gallops [Murmurs] : no murmurs [Heart Sounds Pericardial Friction Rub] : no pericardial rub [Bowel Sounds] : normal bowel sounds [Abdomen Soft] : soft [Abdomen Tenderness] : non-tender [] : no hepato-splenomegaly [Abdomen Mass (___ Cm)] : no abdominal mass palpated [No Palpable Adenopathy] : no palpable adenopathy [Musculoskeletal - Swelling] : no joint swelling [Nail Clubbing] : no clubbing  or cyanosis of the fingernails [FreeTextEntry1] : Reviewed neurologic exams from evaluation by neurology

## 2024-06-05 NOTE — REASON FOR VISIT
[Consultation] : a consultation visit [Family Member] : family member [FreeTextEntry1] : +Syphilis serologies

## 2024-06-05 NOTE — HISTORY OF PRESENT ILLNESS
[FreeTextEntry1] : 78 year old referred for +Syphilis serology during workup for neurologic abnormalities. Right sided weakness x 4 years Memory issues x 3 years, worsening over last 2 years.  Has also experienced weakness and balance issues - but consistently falls to weaker side. CT of head revealed "moderate severity chronic white matter microvascular type changes and chronic lacunar infarcts within the right thalamocapsular junction and left medial cerebellar hemisphere" As part of neurologic workup, syphilis serologies sent and showed Screening test+, RPR <1:1, TP=PA+. Does not recall being treated for syphilis earlier in life. Does not recall any sexually transmitted disease. Does not recall any gluteal injections at any time in her life.  Does not know exactly how many sexual partners. Had many boyfriends as a younger woman back in [place]. Does not recall any vaginal or rectal chancres.  Recalls one episode of vaginal discharge as teenager that was treated orally (unknown agent) in [place].  Never experienced any inguinal adenopathy or rash. Denies floaters. Last eye exam 2 years ago (dilated). Due for repeat. No aortic valve abnormalities other than calcification on May 2024 TTE.

## 2024-06-05 NOTE — ASSESSMENT
[FreeTextEntry1] : 78 year old with cognitive decline in setting of both clinically evident strokes as well as evidence of significant cerebrovascular disease on CT, found to have syphilis serologies c/w distant infection. Absence of +RPR makes neurosyphilis less likely but not definitively ruled out. Extensive discussion with patient's daughter regarding lumbar puncture, including noting that likely explanation for congnitive decline is results from cerebrovascular disease rather than untreated syphilis, but cannot entirely rule out neurosyphilis especially given limited ability to obtain history. Ultimately, patient decided she wanted to proceed with LP for definitive r/o of neurosyphilis.  Will check pre-procedural bloods today and arrange for LP.  Will make neurologists aware of planned procedure so if other CSF tests desired they can be obtained at the same time.

## 2024-06-06 ENCOUNTER — NON-APPOINTMENT (OUTPATIENT)
Age: 79
End: 2024-06-06

## 2024-06-21 ENCOUNTER — RX RENEWAL (OUTPATIENT)
Age: 79
End: 2024-06-21

## 2024-06-24 RX ORDER — PEN NEEDLE, DIABETIC 32GX 5/32"
32G X 4 MM NEEDLE, DISPOSABLE MISCELLANEOUS
Qty: 4 | Refills: 3 | Status: ACTIVE | COMMUNITY
Start: 2023-02-10 | End: 1900-01-01

## 2024-06-24 RX ORDER — INSULIN ASPART 100 [IU]/ML
100 INJECTION, SOLUTION INTRAVENOUS; SUBCUTANEOUS
Qty: 1 | Refills: 2 | Status: ACTIVE | COMMUNITY
Start: 2017-04-26 | End: 1900-01-01

## 2024-06-24 RX ORDER — INSULIN GLARGINE 100 [IU]/ML
100 INJECTION, SOLUTION SUBCUTANEOUS
Qty: 1 | Refills: 3 | Status: ACTIVE | COMMUNITY
Start: 2024-06-24 | End: 1900-01-01

## 2024-07-05 ENCOUNTER — RX RENEWAL (OUTPATIENT)
Age: 79
End: 2024-07-05

## 2024-07-18 ENCOUNTER — NON-APPOINTMENT (OUTPATIENT)
Age: 79
End: 2024-07-18

## 2024-07-18 ENCOUNTER — APPOINTMENT (OUTPATIENT)
Dept: OPHTHALMOLOGY | Facility: CLINIC | Age: 79
End: 2024-07-18
Payer: MEDICARE

## 2024-07-18 PROCEDURE — 92083 EXTENDED VISUAL FIELD XM: CPT

## 2024-07-18 PROCEDURE — 92014 COMPRE OPH EXAM EST PT 1/>: CPT

## 2024-07-18 PROCEDURE — 92133 CPTRZD OPH DX IMG PST SGM ON: CPT

## 2024-07-21 ENCOUNTER — NON-APPOINTMENT (OUTPATIENT)
Age: 79
End: 2024-07-21

## 2024-07-22 ENCOUNTER — APPOINTMENT (OUTPATIENT)
Dept: ELECTROPHYSIOLOGY | Facility: CLINIC | Age: 79
End: 2024-07-22
Payer: MEDICARE

## 2024-07-22 PROCEDURE — 93295 DEV INTERROG REMOTE 1/2/MLT: CPT

## 2024-07-22 PROCEDURE — 93296 REM INTERROG EVL PM/IDS: CPT

## 2024-07-26 ENCOUNTER — NON-APPOINTMENT (OUTPATIENT)
Age: 79
End: 2024-07-26

## 2024-08-13 ENCOUNTER — APPOINTMENT (OUTPATIENT)
Dept: INTERNAL MEDICINE | Facility: CLINIC | Age: 79
End: 2024-08-13
Payer: MEDICARE

## 2024-08-13 ENCOUNTER — LABORATORY RESULT (OUTPATIENT)
Age: 79
End: 2024-08-13

## 2024-08-13 VITALS
WEIGHT: 149 LBS | SYSTOLIC BLOOD PRESSURE: 128 MMHG | OXYGEN SATURATION: 98 % | DIASTOLIC BLOOD PRESSURE: 70 MMHG | HEART RATE: 90 BPM | BODY MASS INDEX: 27.42 KG/M2 | HEIGHT: 62 IN | RESPIRATION RATE: 16 BRPM

## 2024-08-13 DIAGNOSIS — E11.9 TYPE 2 DIABETES MELLITUS W/OUT COMPLICATIONS: ICD-10-CM

## 2024-08-13 DIAGNOSIS — I73.9 PERIPHERAL VASCULAR DISEASE, UNSPECIFIED: ICD-10-CM

## 2024-08-13 DIAGNOSIS — A53.0 LATENT SYPHILIS, UNSPECIFIED AS EARLY OR LATE: ICD-10-CM

## 2024-08-13 DIAGNOSIS — M48.061 SPINAL STENOSIS, LUMBAR REGION WITHOUT NEUROGENIC CLAUDICATION: ICD-10-CM

## 2024-08-13 DIAGNOSIS — K31.89 TYPE 2 DIABETES MELLITUS WITH OTHER SPECIFIED COMPLICATION: ICD-10-CM

## 2024-08-13 DIAGNOSIS — E11.40 TYPE 2 DIABETES MELLITUS WITH DIABETIC NEUROPATHY, UNSPECIFIED: ICD-10-CM

## 2024-08-13 DIAGNOSIS — R41.3 OTHER AMNESIA: ICD-10-CM

## 2024-08-13 DIAGNOSIS — I10 ESSENTIAL (PRIMARY) HYPERTENSION: ICD-10-CM

## 2024-08-13 DIAGNOSIS — R79.89 OTHER SPECIFIED ABNORMAL FINDINGS OF BLOOD CHEMISTRY: ICD-10-CM

## 2024-08-13 DIAGNOSIS — I63.81 OTHER CEREBRAL INFARCTION DUE TO OCCLUSION OR STENOSIS OF SMALL ARTERY: ICD-10-CM

## 2024-08-13 DIAGNOSIS — K31.84 GASTROPARESIS: ICD-10-CM

## 2024-08-13 DIAGNOSIS — K21.9 GASTRO-ESOPHAGEAL REFLUX DISEASE W/OUT ESOPHAGITIS: ICD-10-CM

## 2024-08-13 DIAGNOSIS — M16.10 UNILATERAL PRIMARY OSTEOARTHRITIS, UNSPECIFIED HIP: ICD-10-CM

## 2024-08-13 DIAGNOSIS — R26.89 OTHER ABNORMALITIES OF GAIT AND MOBILITY: ICD-10-CM

## 2024-08-13 DIAGNOSIS — E78.5 HYPERLIPIDEMIA, UNSPECIFIED: ICD-10-CM

## 2024-08-13 DIAGNOSIS — I50.22 CHRONIC SYSTOLIC (CONGESTIVE) HEART FAILURE: ICD-10-CM

## 2024-08-13 DIAGNOSIS — E83.52 HYPERCALCEMIA: ICD-10-CM

## 2024-08-13 DIAGNOSIS — Z95.810 PRESENCE OF AUTOMATIC (IMPLANTABLE) CARDIAC DEFIBRILLATOR: ICD-10-CM

## 2024-08-13 DIAGNOSIS — M54.12 RADICULOPATHY, CERVICAL REGION: ICD-10-CM

## 2024-08-13 DIAGNOSIS — K59.09 OTHER CONSTIPATION: ICD-10-CM

## 2024-08-13 DIAGNOSIS — N18.2 CHRONIC KIDNEY DISEASE, STAGE 2 (MILD): ICD-10-CM

## 2024-08-13 DIAGNOSIS — E11.69 TYPE 2 DIABETES MELLITUS WITH OTHER SPECIFIED COMPLICATION: ICD-10-CM

## 2024-08-13 PROCEDURE — 99214 OFFICE O/P EST MOD 30 MIN: CPT

## 2024-08-13 PROCEDURE — G2211 COMPLEX E/M VISIT ADD ON: CPT

## 2024-08-13 PROCEDURE — 36415 COLL VENOUS BLD VENIPUNCTURE: CPT

## 2024-08-13 NOTE — PHYSICAL EXAM
[No Acute Distress] : no acute distress [Well Nourished] : well nourished [Well Developed] : well developed [Well-Appearing] : well-appearing [Normal Voice/Communication] : normal voice/communication [Normal Sclera/Conjunctiva] : normal sclera/conjunctiva [PERRL] : pupils equal round and reactive to light [EOMI] : extraocular movements intact [Normal Outer Ear/Nose] : the outer ears and nose were normal in appearance [Normal Oropharynx] : the oropharynx was normal [No JVD] : no jugular venous distention [No Lymphadenopathy] : no lymphadenopathy [Supple] : supple [Thyroid Normal, No Nodules] : the thyroid was normal and there were no nodules present [No Respiratory Distress] : no respiratory distress  [No Accessory Muscle Use] : no accessory muscle use [Clear to Auscultation] : lungs were clear to auscultation bilaterally [Normal Rate] : normal rate  [Regular Rhythm] : with a regular rhythm [Normal S1, S2] : normal S1 and S2 [No Murmur] : no murmur heard [No Carotid Bruits] : no carotid bruits [No Abdominal Bruit] : a ~M bruit was not heard ~T in the abdomen [No Edema] : there was no peripheral edema [No Palpable Aorta] : no palpable aorta [Soft] : abdomen soft [Non-distended] : non-distended [No Masses] : no abdominal mass palpated [No HSM] : no HSM [Normal Bowel Sounds] : normal bowel sounds [Normal Posterior Cervical Nodes] : no posterior cervical lymphadenopathy [Normal Anterior Cervical Nodes] : no anterior cervical lymphadenopathy [No CVA Tenderness] : no CVA  tenderness [No Spinal Tenderness] : no spinal tenderness [No Joint Swelling] : no joint swelling [Grossly Normal Strength/Tone] : grossly normal strength/tone [No Rash] : no rash [Coordination Grossly Intact] : coordination grossly intact [No Focal Deficits] : no focal deficits [Speech Grossly Normal] : speech grossly normal [Alert and Oriented x3] : oriented to person, place, and time [Normal Mood] : the mood was normal [de-identified] : dec DP pulses. [de-identified] : reports milf epigastric and ruq tenderness.  no rebound or gaurding. [de-identified] : unsteady gait, with cane [de-identified] : A&O, interacting, appropriate, answering questions.  Appropriate.

## 2024-08-13 NOTE — HEALTH RISK ASSESSMENT
[No] : In the past 12 months have you used drugs other than those required for medical reasons? No [0] : 2) Feeling down, depressed, or hopeless: Not at all (0) [PHQ-2 Negative - No further assessment needed] : PHQ-2 Negative - No further assessment needed [Never] : Never [Audit-CScore] : 0 [[id number]] : 0

## 2024-08-13 NOTE — REVIEW OF SYSTEMS
[Fatigue] : fatigue [Hearing Loss] : hearing loss [Dyspnea on Exertion] : dyspnea on exertion [Abdominal Pain] : abdominal pain [Nausea] : nausea [Constipation] : constipation [Nocturia] : nocturia [Frequency] : frequency [Joint Pain] : joint pain [Muscle Weakness] : muscle weakness [Memory Loss] : memory loss [Unsteady Walking] : ataxia [Negative] : Heme/Lymph [Fever] : no fever [Chills] : no chills [Hot Flashes] : no hot flashes [Night Sweats] : no night sweats [Recent Change In Weight] : ~T no recent weight change [Shortness Of Breath] : no shortness of breath [Wheezing] : no wheezing [Cough] : no cough [Diarrhea] : diarrhea [Vomiting] : no vomiting [Heartburn] : no heartburn [Melena] : no melena [Joint Stiffness] : no joint stiffness [Joint Swelling] : no joint swelling [Muscle Pain] : no muscle pain [Back Pain] : no back pain [Headache] : no headache [Dizziness] : no dizziness [Fainting] : no fainting [FreeTextEntry5] : sleeps on 3 pillows [de-identified] : walks with cane or walker.

## 2024-08-13 NOTE — HISTORY OF PRESENT ILLNESS
[Other: _____] : [unfilled] [FreeTextEntry1] : DM, diabetic neuropathy, renal insuf, hld, ischemiac Cm, CHF, s/p PPM/ICD, GERD/? gastroparesis, PAD, hypercalcemia, hyperparathyroid [de-identified] : Pt is a 80 y/o female with a hx of DM, diabetic neuropathy, renal insuf, hld, htn, ischemiac Cm, CHF, s/p PPM/ICD, GERD/? gastroparesis, PAD, hypercalcemia, hyperparathyroid Here for f/u Following with cardiology and endocrinology.  notes reviewed.  Saw ID and opthalmology since last time.  Notes reviewed.   Has been following with GI.  s/p EGD - chronic inactive gastritis.  n/v has been intermittent. Has seen neurology.  labs - with borderline VLADIMIR and + syphilis screen - FTA +, RPR neg.  CT with old lacunar infarcts - saw stroke neurology.  Was referred to ID.  LP was ordered.   [facility].  Referred to PT and to rehab med.   Reports that she saw pod - Was started on herbal supplement for the neuropathic sx.  Was referred to vascular for the circulation,  She has appt in a few days. Has been taking meds w/o issues.  Getting DM meds adjusted with endocrinology - had jardiance added  diet - reported as healthy.  Walking for exercise FS reported as controlled.   Denies any chest pains or palpitations.  + occ dyspnea when walking or when talking.  + some fatigue after walking.  Sleeps on 3 pillows.     Reports some nausea all the time.  no vomiting.  + some epigastric pains.  no diarrhea.  + constipation.  some GERD sx.  Taking the PPi and PM famotidine + some urinary freq and nocturia.   + numbness at the feet.  no noted focal weakness.  + gen weakness.   c/o le weakness. no rash. + occ dec hearing.   + occ knee pains and hip pains.   Saw neurology for the forgetfuness as noted.   s/p fall in march - container for candle shattered and she jumped back.    echo 5/23 - EF 59%, diastolic dysfunction, LAE, borderline pulm HTN dexa 8/22 - for f/u 8/24 ophtho - 7/24  Vaccines - pneumo '18 and '19 gets flu vaccine

## 2024-08-14 LAB
ALBUMIN SERPL ELPH-MCNC: 4 G/DL
ALP BLD-CCNC: 68 U/L
ALT SERPL-CCNC: 34 U/L
ANION GAP SERPL CALC-SCNC: 11 MMOL/L
AST SERPL-CCNC: 42 U/L
BASOPHILS # BLD AUTO: 0.04 K/UL
BASOPHILS NFR BLD AUTO: 0.6 %
BILIRUB SERPL-MCNC: 0.5 MG/DL
BUN SERPL-MCNC: 25 MG/DL
CALCIUM SERPL-MCNC: 9.8 MG/DL
CHLORIDE SERPL-SCNC: 107 MMOL/L
CHOLEST SERPL-MCNC: 150 MG/DL
CO2 SERPL-SCNC: 24 MMOL/L
CREAT SERPL-MCNC: 1.13 MG/DL
EGFR: 49 ML/MIN/1.73M2
EOSINOPHIL # BLD AUTO: 0.03 K/UL
EOSINOPHIL NFR BLD AUTO: 0.4 %
ESTIMATED AVERAGE GLUCOSE: 143 MG/DL
GLUCOSE SERPL-MCNC: 125 MG/DL
HBA1C MFR BLD HPLC: 6.6 %
HCT VFR BLD CALC: 46.5 %
HDLC SERPL-MCNC: 50 MG/DL
HGB BLD-MCNC: 14.2 G/DL
IMM GRANULOCYTES NFR BLD AUTO: 0.1 %
LDLC SERPL CALC-MCNC: 86 MG/DL
LYMPHOCYTES # BLD AUTO: 1.55 K/UL
LYMPHOCYTES NFR BLD AUTO: 21.6 %
MAGNESIUM SERPL-MCNC: 2.1 MG/DL
MAN DIFF?: NORMAL
MCHC RBC-ENTMCNC: 30.5 GM/DL
MCHC RBC-ENTMCNC: 31.1 PG
MCV RBC AUTO: 101.8 FL
MONOCYTES # BLD AUTO: 0.55 K/UL
MONOCYTES NFR BLD AUTO: 7.6 %
NEUTROPHILS # BLD AUTO: 5.01 K/UL
NEUTROPHILS NFR BLD AUTO: 69.7 %
NONHDLC SERPL-MCNC: 100 MG/DL
PLATELET # BLD AUTO: 129 K/UL
POTASSIUM SERPL-SCNC: 4.7 MMOL/L
PROT SERPL-MCNC: 7.3 G/DL
RBC # BLD: 4.57 M/UL
RBC # FLD: 15.1 %
SODIUM SERPL-SCNC: 141 MMOL/L
TRIGL SERPL-MCNC: 74 MG/DL
WBC # FLD AUTO: 7.19 K/UL

## 2024-08-23 ENCOUNTER — OUTPATIENT (OUTPATIENT)
Dept: OUTPATIENT SERVICES | Facility: HOSPITAL | Age: 79
LOS: 1 days | End: 2024-08-23
Payer: MEDICARE

## 2024-08-23 ENCOUNTER — APPOINTMENT (OUTPATIENT)
Dept: CT IMAGING | Facility: CLINIC | Age: 79
End: 2024-08-23

## 2024-08-23 DIAGNOSIS — Z95.810 PRESENCE OF AUTOMATIC (IMPLANTABLE) CARDIAC DEFIBRILLATOR: Chronic | ICD-10-CM

## 2024-08-23 DIAGNOSIS — Z95.5 PRESENCE OF CORONARY ANGIOPLASTY IMPLANT AND GRAFT: Chronic | ICD-10-CM

## 2024-08-23 DIAGNOSIS — H47.20 UNSPECIFIED OPTIC ATROPHY: ICD-10-CM

## 2024-08-23 PROCEDURE — 70481 CT ORBIT/EAR/FOSSA W/DYE: CPT | Mod: 26

## 2024-08-23 PROCEDURE — 70481 CT ORBIT/EAR/FOSSA W/DYE: CPT

## 2024-09-13 ENCOUNTER — APPOINTMENT (OUTPATIENT)
Dept: VASCULAR SURGERY | Facility: CLINIC | Age: 79
End: 2024-09-13
Payer: MEDICARE

## 2024-09-13 ENCOUNTER — RX RENEWAL (OUTPATIENT)
Age: 79
End: 2024-09-13

## 2024-09-13 VITALS
OXYGEN SATURATION: 96 % | HEIGHT: 62 IN | DIASTOLIC BLOOD PRESSURE: 72 MMHG | BODY MASS INDEX: 26.13 KG/M2 | HEART RATE: 60 BPM | TEMPERATURE: 97.6 F | SYSTOLIC BLOOD PRESSURE: 129 MMHG | WEIGHT: 142 LBS

## 2024-09-13 DIAGNOSIS — R26.89 OTHER ABNORMALITIES OF GAIT AND MOBILITY: ICD-10-CM

## 2024-09-13 DIAGNOSIS — M79.605 PAIN IN LEFT LEG: ICD-10-CM

## 2024-09-13 DIAGNOSIS — E11.42 TYPE 2 DIABETES MELLITUS WITH DIABETIC POLYNEUROPATHY: ICD-10-CM

## 2024-09-13 DIAGNOSIS — M79.604 PAIN IN RIGHT LEG: ICD-10-CM

## 2024-09-13 DIAGNOSIS — I50.22 CHRONIC SYSTOLIC (CONGESTIVE) HEART FAILURE: ICD-10-CM

## 2024-09-13 DIAGNOSIS — M54.12 RADICULOPATHY, CERVICAL REGION: ICD-10-CM

## 2024-09-13 DIAGNOSIS — R60.9 EDEMA, UNSPECIFIED: ICD-10-CM

## 2024-09-13 DIAGNOSIS — M48.061 SPINAL STENOSIS, LUMBAR REGION WITHOUT NEUROGENIC CLAUDICATION: ICD-10-CM

## 2024-09-13 DIAGNOSIS — Z95.810 PRESENCE OF AUTOMATIC (IMPLANTABLE) CARDIAC DEFIBRILLATOR: ICD-10-CM

## 2024-09-13 DIAGNOSIS — M79.89 OTHER SPECIFIED SOFT TISSUE DISORDERS: ICD-10-CM

## 2024-09-13 DIAGNOSIS — I73.9 PERIPHERAL VASCULAR DISEASE, UNSPECIFIED: ICD-10-CM

## 2024-09-13 DIAGNOSIS — I70.209 UNSPECIFIED ATHEROSCLEROSIS OF NATIVE ARTERIES OF EXTREMITIES, UNSPECIFIED EXTREMITY: ICD-10-CM

## 2024-09-13 DIAGNOSIS — N18.2 CHRONIC KIDNEY DISEASE, STAGE 2 (MILD): ICD-10-CM

## 2024-09-13 PROCEDURE — 99205 OFFICE O/P NEW HI 60 MIN: CPT

## 2024-09-13 NOTE — CONSULT LETTER
[Dear  ___] : Dear  [unfilled], [Consult Letter:] : I had the pleasure of evaluating your patient, [unfilled]. [Please see my note below.] : Please see my note below. [Consult Closing:] : Thank you very much for allowing me to participate in the care of this patient.  If you have any questions, please do not hesitate to contact me. [Sincerely,] : Sincerely, [FreeTextEntry2] : Dr Elli Hogan  [phone] [FreeTextEntry3] : Joey Casey MD vascular and wound  care surgeon [phone]

## 2024-09-13 NOTE — HISTORY OF PRESENT ILLNESS
[FreeTextEntry1] : This is a 79-year-old female who was referred for evaluation of bilateral lower extremity peripheral arterial disease and swelling in the lower extremity.  According to the history obtained from the patient and the patient's daughter she has a pain discomfort in both feet and the toes and plantar aspect of both feet.  She had seen the podiatrist  and is referred for ruling out peripheral arterial disease.  Patient states that she has a discomfort and pain radiating down her both legs especially on the left side.  Patient had seen the primary care doctor and had undergone a carotid Dopplers which showed less than 40% stenosis of internal carotid arteries.  Patient also complains of swelling on both lower extremity more on the right than the left and numbness on the plantar aspect of her extremities.  Patient with a history of spinal stenosis cervical as well as lumbar and history of her diabetes with polyneuropathy diabetes with a gastroc apathy as well and degeneration of the intervertebral disc of lumbar regions.  Patient with a chronic kidney disease as well as a congestive heart failure and GERD's. Patient is able to walk with a walker patient also has been seen by pain management in the past without any painkillers or without any epidural injections.

## 2024-09-13 NOTE — PHYSICAL EXAM
[JVD] : no jugular venous distention  [Normal Thyroid] : the thyroid was normal [Normal Breath Sounds] : Normal breath sounds [Right Carotid Bruit] : no bruit heard over the right carotid [Left Carotid Bruit] : no bruit heard over the left carotid [2+] : left 2+ [1+] : left 1+ [Right Femoral Bruit] : no bruit heard over the right femoral artery [Left Femoral Bruit] : no bruit heard over the left femoral artery [0] : left 0 [Ankle Swelling (On Exam)] : present [Ankle Swelling Bilaterally] : bilaterally  [Ankle Swelling On The Right] : mild [Varicose Veins Of Lower Extremities] : not present [] : not present [Abdomen Masses] : No abdominal masses [Tender] : was nontender [Stool Sample Taken] : No stool obtained  on rectal exam [No Rash or Lesion] : No rash or lesion [Purpura] : no purpura  [Petechiae] : no petechiae [Skin Ulcer] : no ulcer [Skin Induration] : no induration [Alert] : alert [Oriented to Person] : oriented to person [Oriented to Place] : oriented to place [Oriented to Time] : oriented to time [Calm] : calm [de-identified] : Well-built well-nourished looks good for her age [de-identified] : Within normal limits [de-identified] : Within normal limits [TextEntry] : Patient examined in supine sitting and standing position.  Both lower extremity exams are done in detail upper extremity respiratory cardiac exams are grossly normal abdomen is soft nonpulsatile nontender hernia sites are clear.  Both femoral arteries are 1+ no popliteal or no pedal pulses palpable bilaterally.  Both feet are slightly cool to touch with cap refill less than 3 seconds no acute arterial ischemia mild edema on dorsum of both feet as well as ankles.  Compartments are soft to touch and no cellulitis no infection and no open wounds.  The straight leg raising test is about 80 degrees bilaterally.  No swelling in the knees or the ankle the musculoskeletal muscular system appears grossly normal

## 2024-09-13 NOTE — REVIEW OF SYSTEMS
[Lower Ext Edema] : lower extremity edema [Arthralgias] : arthralgias [Joint Swelling] : joint swelling [Joint Stiffness] : joint stiffness [Limb Pain] : limb pain [Limb Swelling] : limb swelling [As Noted in HPI] : as noted in HPI [Negative] : Heme/Lymph [de-identified] : Tingling numbness in the plantar aspect of both feet

## 2024-09-13 NOTE — DATA REVIEWED
[FreeTextEntry1] : Carotid Doppler reviewed the results are reviewed and discussed with the patient.  Patient has less than 40% stenosis of the carotids

## 2024-09-13 NOTE — ASSESSMENT
[FreeTextEntry1] : 79-year-old female with a bilateral lower extremity peripheral arterial disease with no palpable pedal pulses but no critical limb ischemia.  Also may have element of neuropathy with the symptoms of tingling numbness on plantar aspect of the feet and also may be secondary to radiculopathy.  Doubt patient has a critical limb ischemia based on the clinical exam and the symptoms.  Patient may also have some claudication by virtue of her pain on discomfort in calfs upon walking though very vague symptoms.

## 2024-09-13 NOTE — PLAN
[TextEntry] : The plan at this stage is to get a venous Doppler of both lower extremities as well as get JANAY PVRs of both lower extremity to rule out peripheral arterial disease and after the above test are done we will consider starting the patient on cilostazol have discussed and educated the patient's daughter as well as the patient with the above symptoms complaints and possible diagnosis and options for the treatment.

## 2024-09-16 ENCOUNTER — RX RENEWAL (OUTPATIENT)
Age: 79
End: 2024-09-16

## 2024-09-16 RX ORDER — CALCIUM CARBONATE/VITAMIN D3 600 MG-10
600-10 TABLET ORAL
Qty: 90 | Refills: 3 | Status: ACTIVE | COMMUNITY
Start: 2024-09-16 | End: 1900-01-01

## 2024-09-17 ENCOUNTER — RX RENEWAL (OUTPATIENT)
Age: 79
End: 2024-09-17

## 2024-10-01 ENCOUNTER — APPOINTMENT (OUTPATIENT)
Dept: ENDOCRINOLOGY | Facility: CLINIC | Age: 79
End: 2024-10-01
Payer: MEDICARE

## 2024-10-01 VITALS
HEIGHT: 62 IN | BODY MASS INDEX: 27.6 KG/M2 | WEIGHT: 150 LBS | OXYGEN SATURATION: 97 % | SYSTOLIC BLOOD PRESSURE: 130 MMHG | HEART RATE: 73 BPM | DIASTOLIC BLOOD PRESSURE: 82 MMHG

## 2024-10-01 DIAGNOSIS — E78.5 HYPERLIPIDEMIA, UNSPECIFIED: ICD-10-CM

## 2024-10-01 DIAGNOSIS — E11.9 TYPE 2 DIABETES MELLITUS W/OUT COMPLICATIONS: ICD-10-CM

## 2024-10-01 DIAGNOSIS — I10 ESSENTIAL (PRIMARY) HYPERTENSION: ICD-10-CM

## 2024-10-01 PROCEDURE — 99214 OFFICE O/P EST MOD 30 MIN: CPT

## 2024-10-01 PROCEDURE — G2211 COMPLEX E/M VISIT ADD ON: CPT

## 2024-10-01 NOTE — HISTORY OF PRESENT ILLNESS
[FreeTextEntry1] : This is a 79 year old woman w/ DM2, CHF, CAD, + AICD, here for f/u visit for DM2. Patient is here with her family member.  She was seen by CDE 8/2016 for a post hospitalization visit. She had had hospital admission for chest pain, dizziness, and LE weakness. She had elevated BG at home and went to ED. A1c on admission = 13.4%.Cr = 1.94 but improved to 1.02 by d/c. Glucose on admission 900. Patient had been on basal bolus regimen since.   Last visit, patient was recommended to reduce basal bolus insulin to lower doses secondary to recurrent hypoglycemia. Patient reports that she is taking Levemir 16 units at bedtime NovoLog 5 units 3 times daily with meals Jardiance 10 mg once daily  She had optho appt: She is up to date with eye doctor, she had cataract that's removed and she has a history of diabetic retinopathy. Dr. Ramirez   She hasn't been followed with foot doctor yet.   DEXA 2018 WNL. DEXA 2022, osteopenia in spine, FRAX  Major fracture 3.9%, Hip Frature 0.5%  Abd US done 6/2020 showed simple renal cyst.  History of hypertension, managed by cardiology.  History of CHF, also with cardiology follow-up.  History of hyperlipidemia, currently on Zetia 10 mg daily.

## 2024-10-01 NOTE — ASSESSMENT
[FreeTextEntry1] : Patient is a 78 year-old woman with history of type 2 diabetes, CHF, CAD, AICD, here to follow-up for type 2 diabetes.  1. Type 2 diabetes A1c 6.6% August 2024 A1c 6.7% May 2024 A1c 6.6% January 2024 A1c 6.8% October 2023 Co ntinue to monitor glucose 4 times daily.  Patient reports that she forgot her meter today. A1c currently at goal without hypoglycemia. No side effect with Jardiance 10 mg once daily. Recommend to continue with the above regimen Diabetes regimen: Basaglar 16 units at bedtime NovoLog 5 units 3 times daily with meals Jardiance 10 mg once daily. Patient was given referral to ophthalmology. She is up-to-date with podiatry.  Foot exam done last visit within normal limits.   2. Hypertension BP goal <130/80 Blood pressure 130/82 eGFR 49 mL/min/1.73M2 August 2024 Creatinine 1.13 mg/dL August 2024 Continue to follow-up with cardiology.  Management of BP medication as per cardiology and CHF team. BP medication: Metoprolol 100 mg once daily Losartan 50 mg once daily Spironolactone 25 mg once daily, of note starting SGLT2 inhibitor with Jardiance 10 mg once daily.  Will let cardiologist know.  3. Hyperlipidemia LDL goal <70 mg/dL Patient is currently on Zetia 10 mg once daily Rosuvastatin 40 mg once daily  4.  Osteoporosis screening Bone density done in August 2022 showed T score of -1.2 in the lumbar L1-L2 region. Femoral neck T score of -0.7, total hip T score -0.8 With risks of major osteoporotic fracture of 3.9% and hip fracture of 0.5%. Repeat bone mineral density is done today.  Please see results attached. No need to

## 2024-10-01 NOTE — PHYSICAL EXAM
[Alert] : alert [Well Nourished] : well nourished [FreeTextEntry1] : General: No acute distress, slow to respond  CV: RRR Lungs: Breathing comfortably on RA  Ext: No peripheral edema  Neuro:          Mental status: A&O to person/place/year/month/day          MMS: 18/30 difficulties in visuospatial/executive functioning, difficulties in memory, difficulties in attention, difficulties in abstraction, difficulties in language, difficulties          Cognition: Answering questions appropriately         CN: II-VII intact         Strength:                      Right UE: 1/5 EF, 1/5 EE, 0/5 WE, 0/5 FF, 0/5 FA  TONE: MAS3 biceps, MAS3 FDS/Wrist flexors,                       MAS3 Wrist extensors                      Left UE:  5/5 EF, 5/5 EE, 5/5 WE, 5/5 FF, 5/5 FA                      Right LE: 2/5 HF, 2/5 KE, 2/5 KF, 1/5 Dorsi, 1/5 Plantar                      Left LE: 5/5 HF, 5/5 KE, 5/5 KF, 5/5 Dorsi, 5/5 Plantar          Sensation: Intact to LT throughout all dermatomal distributions          Coordination: Intact finger-to-nose right, intact finger to nose left, intact heel to shin right, intact heel to shin left          Gait: Trendelenburg with circumduction, ambulates with quad cane  [No Acute Distress] : no acute distress [Well Developed] : well developed [Normal Sclera/Conjunctiva] : normal sclera/conjunctiva [EOMI] : extra ocular movement intact [No Proptosis] : no proptosis [Normal Oropharynx] : the oropharynx was normal [Thyroid Not Enlarged] : the thyroid was not enlarged [No Thyroid Nodules] : no palpable thyroid nodules [No Respiratory Distress] : no respiratory distress [No Accessory Muscle Use] : no accessory muscle use [Clear to Auscultation] : lungs were clear to auscultation bilaterally [Normal S1, S2] : normal S1 and S2 [Normal Rate] : heart rate was normal [Regular Rhythm] : with a regular rhythm [No Edema] : no peripheral edema [Pedal Pulses Normal] : the pedal pulses are present [Normal Bowel Sounds] : normal bowel sounds [Not Tender] : non-tender [Not Distended] : not distended [Soft] : abdomen soft [Normal Anterior Cervical Nodes] : no anterior cervical lymphadenopathy [No Spinal Tenderness] : no spinal tenderness [Spine Straight] : spine straight [No Stigmata of Cushings Syndrome] : no stigmata of Cushings Syndrome [Normal Gait] : normal gait [Normal Strength/Tone] : muscle strength and tone were normal [No Rash] : no rash [Normal Reflexes] : deep tendon reflexes were 2+ and symmetric [No Tremors] : no tremors [Oriented x3] : oriented to person, place, and time [Acanthosis Nigricans] : no acanthosis nigricans

## 2024-10-01 NOTE — THERAPY
[TextEntry] : 10/01/2024  Levemir 16 units at bedtime NovoLog 5 units 3 times daily with meals Jardiance 10 mg once daily

## 2024-10-09 ENCOUNTER — APPOINTMENT (OUTPATIENT)
Dept: VASCULAR SURGERY | Facility: CLINIC | Age: 79
End: 2024-10-09
Payer: MEDICARE

## 2024-10-09 PROCEDURE — 93923 UPR/LXTR ART STDY 3+ LVLS: CPT

## 2024-10-09 PROCEDURE — 93970 EXTREMITY STUDY: CPT

## 2024-10-23 ENCOUNTER — APPOINTMENT (OUTPATIENT)
Dept: ELECTROPHYSIOLOGY | Facility: CLINIC | Age: 79
End: 2024-10-23
Payer: MEDICARE

## 2024-10-23 ENCOUNTER — NON-APPOINTMENT (OUTPATIENT)
Age: 79
End: 2024-10-23

## 2024-10-23 PROCEDURE — 93296 REM INTERROG EVL PM/IDS: CPT

## 2024-10-23 PROCEDURE — 93295 DEV INTERROG REMOTE 1/2/MLT: CPT

## 2024-10-25 ENCOUNTER — APPOINTMENT (OUTPATIENT)
Dept: VASCULAR SURGERY | Facility: CLINIC | Age: 79
End: 2024-10-25

## 2024-10-25 VITALS
HEART RATE: 80 BPM | WEIGHT: 150 LBS | HEIGHT: 62 IN | DIASTOLIC BLOOD PRESSURE: 77 MMHG | SYSTOLIC BLOOD PRESSURE: 125 MMHG | BODY MASS INDEX: 27.6 KG/M2 | OXYGEN SATURATION: 96 % | TEMPERATURE: 97.8 F

## 2024-10-25 DIAGNOSIS — R60.9 EDEMA, UNSPECIFIED: ICD-10-CM

## 2024-10-25 DIAGNOSIS — M79.605 PAIN IN LEFT LEG: ICD-10-CM

## 2024-10-25 DIAGNOSIS — I70.209 UNSPECIFIED ATHEROSCLEROSIS OF NATIVE ARTERIES OF EXTREMITIES, UNSPECIFIED EXTREMITY: ICD-10-CM

## 2024-10-25 DIAGNOSIS — E11.40 TYPE 2 DIABETES MELLITUS WITH DIABETIC NEUROPATHY, UNSPECIFIED: ICD-10-CM

## 2024-10-25 PROCEDURE — 93925 LOWER EXTREMITY STUDY: CPT

## 2024-10-25 PROCEDURE — 99214 OFFICE O/P EST MOD 30 MIN: CPT

## 2024-10-25 RX ORDER — CILOSTAZOL 50 MG/1
50 TABLET ORAL
Qty: 180 | Refills: 1 | Status: ACTIVE | COMMUNITY
Start: 2024-10-25 | End: 1900-01-01

## 2024-10-28 ENCOUNTER — APPOINTMENT (OUTPATIENT)
Dept: NEUROLOGY | Facility: CLINIC | Age: 79
End: 2024-10-28

## 2024-11-05 ENCOUNTER — RX RENEWAL (OUTPATIENT)
Age: 79
End: 2024-11-05

## 2024-11-08 ENCOUNTER — RX RENEWAL (OUTPATIENT)
Age: 79
End: 2024-11-08

## 2024-11-12 ENCOUNTER — NON-APPOINTMENT (OUTPATIENT)
Age: 79
End: 2024-11-12

## 2024-11-12 ENCOUNTER — INPATIENT (INPATIENT)
Facility: HOSPITAL | Age: 79
LOS: 1 days | Discharge: HOME HEALTH | End: 2024-11-14
Attending: HOSPITALIST | Admitting: HOSPITALIST
Payer: MEDICARE

## 2024-11-12 ENCOUNTER — APPOINTMENT (OUTPATIENT)
Dept: HEART FAILURE | Facility: CLINIC | Age: 79
End: 2024-11-12
Payer: MEDICARE

## 2024-11-12 VITALS
TEMPERATURE: 99 F | HEIGHT: 62 IN | HEART RATE: 75 BPM | DIASTOLIC BLOOD PRESSURE: 81 MMHG | SYSTOLIC BLOOD PRESSURE: 135 MMHG | OXYGEN SATURATION: 98 % | RESPIRATION RATE: 18 BRPM | WEIGHT: 149.91 LBS

## 2024-11-12 VITALS
WEIGHT: 150 LBS | HEART RATE: 94 BPM | HEIGHT: 62 IN | BODY MASS INDEX: 27.6 KG/M2 | OXYGEN SATURATION: 95 % | DIASTOLIC BLOOD PRESSURE: 65 MMHG | SYSTOLIC BLOOD PRESSURE: 111 MMHG

## 2024-11-12 DIAGNOSIS — Z95.810 PRESENCE OF AUTOMATIC (IMPLANTABLE) CARDIAC DEFIBRILLATOR: Chronic | ICD-10-CM

## 2024-11-12 DIAGNOSIS — Z95.5 PRESENCE OF CORONARY ANGIOPLASTY IMPLANT AND GRAFT: Chronic | ICD-10-CM

## 2024-11-12 DIAGNOSIS — R06.09 OTHER FORMS OF DYSPNEA: ICD-10-CM

## 2024-11-12 LAB
ALBUMIN SERPL ELPH-MCNC: 3.9 G/DL — SIGNIFICANT CHANGE UP (ref 3.3–5)
ALP SERPL-CCNC: 68 U/L — SIGNIFICANT CHANGE UP (ref 40–120)
ALT FLD-CCNC: 29 U/L — SIGNIFICANT CHANGE UP (ref 4–33)
ANION GAP SERPL CALC-SCNC: 12 MMOL/L — SIGNIFICANT CHANGE UP (ref 7–14)
AST SERPL-CCNC: 41 U/L — HIGH (ref 4–32)
BASOPHILS # BLD AUTO: 0.04 K/UL — SIGNIFICANT CHANGE UP (ref 0–0.2)
BASOPHILS NFR BLD AUTO: 0.7 % — SIGNIFICANT CHANGE UP (ref 0–2)
BILIRUB SERPL-MCNC: 0.4 MG/DL — SIGNIFICANT CHANGE UP (ref 0.2–1.2)
BUN SERPL-MCNC: 28 MG/DL — HIGH (ref 7–23)
CALCIUM SERPL-MCNC: 10.2 MG/DL — SIGNIFICANT CHANGE UP (ref 8.4–10.5)
CHLORIDE SERPL-SCNC: 103 MMOL/L — SIGNIFICANT CHANGE UP (ref 98–107)
CO2 SERPL-SCNC: 24 MMOL/L — SIGNIFICANT CHANGE UP (ref 22–31)
CREAT SERPL-MCNC: 1.18 MG/DL — SIGNIFICANT CHANGE UP (ref 0.5–1.3)
EGFR: 46 ML/MIN/1.73M2 — LOW
EOSINOPHIL # BLD AUTO: 0.08 K/UL — SIGNIFICANT CHANGE UP (ref 0–0.5)
EOSINOPHIL NFR BLD AUTO: 1.3 % — SIGNIFICANT CHANGE UP (ref 0–6)
FLUAV AG NPH QL: SIGNIFICANT CHANGE UP
FLUBV AG NPH QL: SIGNIFICANT CHANGE UP
GAS PNL BLDV: SIGNIFICANT CHANGE UP
GLUCOSE SERPL-MCNC: 99 MG/DL — SIGNIFICANT CHANGE UP (ref 70–99)
HCT VFR BLD CALC: 42.9 % — SIGNIFICANT CHANGE UP (ref 34.5–45)
HGB BLD-MCNC: 13.9 G/DL — SIGNIFICANT CHANGE UP (ref 11.5–15.5)
IANC: 3.55 K/UL — SIGNIFICANT CHANGE UP (ref 1.8–7.4)
IMM GRANULOCYTES NFR BLD AUTO: 0.3 % — SIGNIFICANT CHANGE UP (ref 0–0.9)
LIDOCAIN IGE QN: 44 U/L — SIGNIFICANT CHANGE UP (ref 7–60)
LYMPHOCYTES # BLD AUTO: 1.43 K/UL — SIGNIFICANT CHANGE UP (ref 1–3.3)
LYMPHOCYTES # BLD AUTO: 23.8 % — SIGNIFICANT CHANGE UP (ref 13–44)
MAGNESIUM SERPL-MCNC: 2.5 MG/DL — SIGNIFICANT CHANGE UP (ref 1.6–2.6)
MCHC RBC-ENTMCNC: 31.1 PG — SIGNIFICANT CHANGE UP (ref 27–34)
MCHC RBC-ENTMCNC: 32.4 G/DL — SIGNIFICANT CHANGE UP (ref 32–36)
MCV RBC AUTO: 96 FL — SIGNIFICANT CHANGE UP (ref 80–100)
MONOCYTES # BLD AUTO: 0.89 K/UL — SIGNIFICANT CHANGE UP (ref 0–0.9)
MONOCYTES NFR BLD AUTO: 14.8 % — HIGH (ref 2–14)
NEUTROPHILS # BLD AUTO: 3.55 K/UL — SIGNIFICANT CHANGE UP (ref 1.8–7.4)
NEUTROPHILS NFR BLD AUTO: 59.1 % — SIGNIFICANT CHANGE UP (ref 43–77)
NRBC # BLD: 0 /100 WBCS — SIGNIFICANT CHANGE UP (ref 0–0)
NRBC # FLD: 0 K/UL — SIGNIFICANT CHANGE UP (ref 0–0)
NT-PROBNP SERPL-SCNC: 129 PG/ML — SIGNIFICANT CHANGE UP
PLATELET # BLD AUTO: 127 K/UL — LOW (ref 150–400)
POTASSIUM SERPL-MCNC: 4.7 MMOL/L — SIGNIFICANT CHANGE UP (ref 3.5–5.3)
POTASSIUM SERPL-SCNC: 4.7 MMOL/L — SIGNIFICANT CHANGE UP (ref 3.5–5.3)
PROT SERPL-MCNC: 7.7 G/DL — SIGNIFICANT CHANGE UP (ref 6–8.3)
RBC # BLD: 4.47 M/UL — SIGNIFICANT CHANGE UP (ref 3.8–5.2)
RBC # FLD: 14.3 % — SIGNIFICANT CHANGE UP (ref 10.3–14.5)
RSV RNA NPH QL NAA+NON-PROBE: SIGNIFICANT CHANGE UP
SARS-COV-2 RNA SPEC QL NAA+PROBE: SIGNIFICANT CHANGE UP
SODIUM SERPL-SCNC: 139 MMOL/L — SIGNIFICANT CHANGE UP (ref 135–145)
TROPONIN T, HIGH SENSITIVITY RESULT: 21 NG/L — SIGNIFICANT CHANGE UP
TROPONIN T, HIGH SENSITIVITY RESULT: 23 NG/L — SIGNIFICANT CHANGE UP
WBC # BLD: 6.01 K/UL — SIGNIFICANT CHANGE UP (ref 3.8–10.5)
WBC # FLD AUTO: 6.01 K/UL — SIGNIFICANT CHANGE UP (ref 3.8–10.5)

## 2024-11-12 PROCEDURE — 93000 ELECTROCARDIOGRAM COMPLETE: CPT

## 2024-11-12 PROCEDURE — 71045 X-RAY EXAM CHEST 1 VIEW: CPT | Mod: 26

## 2024-11-12 PROCEDURE — G2211 COMPLEX E/M VISIT ADD ON: CPT

## 2024-11-12 PROCEDURE — 99223 1ST HOSP IP/OBS HIGH 75: CPT | Mod: GC

## 2024-11-12 PROCEDURE — 99285 EMERGENCY DEPT VISIT HI MDM: CPT

## 2024-11-12 PROCEDURE — 74177 CT ABD & PELVIS W/CONTRAST: CPT | Mod: 26,MC

## 2024-11-12 PROCEDURE — 99214 OFFICE O/P EST MOD 30 MIN: CPT

## 2024-11-12 RX ORDER — ONDANSETRON HYDROCHLORIDE 2 MG/ML
4 INJECTION, SOLUTION INTRAMUSCULAR; INTRAVENOUS EVERY 8 HOURS
Refills: 0 | Status: DISCONTINUED | OUTPATIENT
Start: 2024-11-12 | End: 2024-11-14

## 2024-11-12 RX ORDER — INFLUENZ VIR VAC TV P-SURF2003 15MCG/.5ML
0.5 SYRINGE (ML) INTRAMUSCULAR ONCE
Refills: 0 | Status: DISCONTINUED | OUTPATIENT
Start: 2024-11-12 | End: 2024-11-14

## 2024-11-12 RX ORDER — MELATONIN 5 MG
3 TABLET ORAL AT BEDTIME
Refills: 0 | Status: DISCONTINUED | OUTPATIENT
Start: 2024-11-12 | End: 2024-11-14

## 2024-11-12 RX ORDER — ONDANSETRON HYDROCHLORIDE 2 MG/ML
4 INJECTION, SOLUTION INTRAMUSCULAR; INTRAVENOUS ONCE
Refills: 0 | Status: COMPLETED | OUTPATIENT
Start: 2024-11-12 | End: 2024-11-12

## 2024-11-12 RX ORDER — METOPROLOL TARTRATE 50 MG
25 TABLET ORAL ONCE
Refills: 0 | Status: DISCONTINUED | OUTPATIENT
Start: 2024-11-12 | End: 2024-11-12

## 2024-11-12 RX ORDER — ACETAMINOPHEN 500 MG
650 TABLET ORAL EVERY 6 HOURS
Refills: 0 | Status: DISCONTINUED | OUTPATIENT
Start: 2024-11-12 | End: 2024-11-13

## 2024-11-12 RX ADMIN — Medication 500 MILLILITER(S): at 15:45

## 2024-11-12 RX ADMIN — ONDANSETRON HYDROCHLORIDE 4 MILLIGRAM(S): 2 INJECTION, SOLUTION INTRAMUSCULAR; INTRAVENOUS at 15:45

## 2024-11-12 NOTE — H&P ADULT - NSHPPHYSICALEXAM_GEN_ALL_CORE
Vital Signs Last 24 Hrs  T(C): 36.7 (12 Nov 2024 20:50), Max: 37.2 (12 Nov 2024 12:32)  T(F): 98.1 (12 Nov 2024 20:50), Max: 98.9 (12 Nov 2024 12:32)  HR: 63 (12 Nov 2024 20:50) (59 - 75)  BP: 150/63 (12 Nov 2024 20:50) (112/59 - 150/63)  BP(mean): --  RR: 18 (12 Nov 2024 20:50) (17 - 19)  SpO2: 100% (12 Nov 2024 20:50) (95% - 100%)    Parameters below as of 12 Nov 2024 20:50  Patient On (Oxygen Delivery Method): room air      PHYSICAL EXAM:  GENERAL: NAD, well-groomed, well-developed  HEAD:  Atraumatic, Normocephalic  EYES: Conjunctiva and sclera clear  ENMT: No tonsillar erythema, exudates, or enlargement; Moist mucous membranes  NECK: Supple, No JVD,   HEART: Regular rate and rhythm; No murmurs, rubs, or gallops  RESPIRATORY: CTA B/L, No W/R/R  ABDOMEN: TTP RLQ. Soft, Nondistended; Bowel sounds present  NEUROLOGY: A&Ox3, nonfocal, moving all extremities  EXTREMITIES:  2+ Peripheral Pulses, No clubbing, cyanosis. +1 pitting edema b/l.  MSK: Neck tenderness  SKIN: warm, dry, normal color.

## 2024-11-12 NOTE — ED PROVIDER NOTE - PHYSICAL EXAMINATION
Vital signs: Reviewed.   General: Well-Appearing, in no acute distress  Head: Atraumatic, normocephalic  Eyes: Normal eyelids, conjunctiva, and sclera; no discharge  Neck: Normal appearing; Trachea midline  Cardiovascular: Regular rate and rhythm, no murmurs rubs or gallops were appreciated. No JVD. B/l LE edema 1+ pitting reportedly chronic  Lungs: Normal rate, rhythm and depth of respirations; no accessory muscle use; no wheezes, rales, or rhonchi. fine bibasilar crackles auscultated, no evidence of airway compromise  Abdomen: Soft, nondistended. TTP RUQ, RLQ without rebound. No CVA tenderness b/l  Neuro: AAOx3, moving all 4 extremities, mentating appropriately, CN2-12 grossly intact, no focal neurologic deficits  Derm: w/d/i  Psych: mood and affect appropriate and congruent

## 2024-11-12 NOTE — H&P ADULT - HISTORY OF PRESENT ILLNESS
79 y.o. female with PMH of CAD s/p stents x 3 and AICD, CHF, hypertension, hyperlipidemia, diabetes, CKD, lower extremity arterial sclerosis presenting with 1 week of progressively worsening anterior chest pain, shortness of breath with productive cough, generalized weakness and chills, RLQ abdominal pain, dysuria and intermittent palpitations.  Patient was sent by her cardiologist due to her shortness of breath/dyspnea on exertion. Patient states shortness of breath is intermittent, denies aggravating factors. States she notices even when siting or lying down not just when moving around. Also endorses orthopnea, states she has been sleeping with 2 pillows at night. Patient states her chest pain is worse when she touches the lower and middle portion of her sternum. She describes pain as localized, non-radiating, 8/10 in severity.  Denies any aggravating factors, patient has had some relief of pain with extra strength tylenol. Denies syncope/presyncope. Patient endorses tactile fever, malaise, rhinorrhea, sore throat, urinary frequency. Denies N/V/D, melena, hematochezia, hematuria, flank pain. tolerating small amounts of food and fluid.    In ED, EKG w/ no ST elevations or depressions, troponin wnl, chest XR normal, negative RVP. CT abdomen/pelvis with no acute abdominopelvic pathology; severe calcified and noncalcified plaques along the superior mesenteric artery may predispose patient to chronic mesenteric ischemia.     79 y.o. female with PMH of CAD s/p stents x 3 and AICD, CHF, hypertension, hyperlipidemia, diabetes, CKD, lower extremity arterial sclerosis presenting with 1 week of progressively worsening anterior chest pain, shortness of breath with productive cough, generalized weakness and chills, RLQ abdominal pain, dysuria and intermittent palpitations.  Patient was sent by her cardiologist due to her shortness of breath/dyspnea on exertion. Patient states shortness of breath is intermittent, denies aggravating factors. States she notices even when siting or lying down not just when moving around. Also endorses orthopnea, states she has been sleeping with 2 pillows at night. Patient states her chest pain is worse when she touches the lower and middle portion of her sternum. She describes pain as localized, non-radiating, 8/10 in severity.  Denies any aggravating factors, patient has had some relief of pain with extra strength tylenol. Denies syncope/presyncope. Patient endorses tactile fever, malaise, rhinorrhea, sore throat, urinary frequency. Denies N/V/D, melena, hematochezia, hematuria, flank pain. tolerating small amounts of food and fluid.    In ED, EKG w/ no ST elevations or depressions, troponin wnl, chest XR normal, negative RVP. CT abdomen/pelvis with no acute abdominopelvic pathology; severe calcified and noncalcified plaques along the superior mesenteric artery may predispose patient to chronic mesenteric ischemia. Lactate wnl.     79 y.o. female with PMH of CAD s/p stents x 3 and AICD, CHF, hypertension, hyperlipidemia, diabetes, CKD, lower extremity arterial sclerosis presenting with 1 week of progressively worsening anterior chest pain, shortness of breath with productive cough, generalized weakness and chills, RUQ/RLQ abdominal pain, dysuria and intermittent palpitations.  Patient was sent by her cardiologist due to her shortness of breath/dyspnea on exertion. Patient states shortness of breath is intermittent, denies aggravating factors. States she notices even when siting or lying down not just when moving around. Also endorses orthopnea, states she has been sleeping with 2 pillows at night. Patient states her chest pain is worse when she touches the lower and middle portion of her sternum. She describes pain as localized, non-radiating, 8/10 in severity.  Denies any aggravating factors, patient has had some relief of pain with extra strength tylenol. Denies syncope/presyncope. Patient endorses tactile fever, malaise, rhinorrhea, sore throat, urinary frequency. Denies N/V/D, melena, hematochezia, hematuria, flank pain. tolerating small amounts of food and fluid.    In ED, EKG w/ no ST elevations or depressions, troponin wnl, chest XR normal, negative RVP. CT abdomen/pelvis with no acute abdominopelvic pathology; severe calcified and noncalcified plaques along the superior mesenteric artery may predispose patient to chronic mesenteric ischemia. Lactate wnl.     80yo female with MHx of CAD s/p stents x 3 and AICD, CHF, hypertension, hyperlipidemia, diabetes, CKD, lower extremity arterial sclerosis presenting with 1 week of progressively worsening anterior chest pain, shortness of breath with productive cough, generalized weakness and chills, RUQ/RLQ abdominal pain, dysuria and intermittent palpitations.  Patient was sent by her cardiologist due to her shortness of breath/dyspnea on exertion. Patient states shortness of breath is intermittent, denies aggravating factors. States she notices even when siting or lying down not just when moving around. Also endorses orthopnea, states she has been sleeping with 2 pillows at night. Patient states her chest pain is worse when she touches the lower and middle portion of her sternum. She describes pain as localized, non-radiating, 8/10 in severity.  Denies any aggravating factors, patient has had some relief of pain with extra strength tylenol. Denies syncope/presyncope. Patient endorses tactile fever, malaise, rhinorrhea, sore throat, urinary frequency. Denies N/V/D, melena, hematochezia, hematuria, flank pain. tolerating small amounts of food and fluid.    In ED, EKG w/ no ST elevations or depressions, troponin wnl, chest XR normal, negative RVP. CT abdomen/pelvis with no acute abdominopelvic pathology; severe calcified and noncalcified plaques along the superior mesenteric artery may predispose patient to chronic mesenteric ischemia. Lactate wnl.     80yo female with MHx of CAD s/p stents x 3 and AICD, CHF (EF=55%), hypertension, hyperlipidemia, diabetes, CKD, lower extremity arterial sclerosis presenting with 1 week of progressively worsening anterior chest pain, shortness of breath with occasionally  productive cough of white sputum, generalized weakness and chills, RUQ/RLQ abdominal pain, dysuria and intermittent palpitations.  Patient was sent by her cardiologist due to her shortness of breath/dyspnea on exertion. Patient states shortness of breath is intermittent, denies aggravating factors. States she notices even when siting or lying down not just when moving around. Also endorses orthopnea, states she has been sleeping with 2 pillows at night. Patient states her chest pain is worse when she touches the lower and middle portion of her sternum. She describes pain as localized, non-radiating, 8/10 in severity.  Denies any aggravating factors, patient has had some relief of pain with extra strength tylenol. Denies syncope/presyncope. Patient endorses tactile fever, malaise, rhinorrhea, sore throat, urinary frequency. Denies N/V/D, melena, hematochezia, hematuria, flank pain. tolerating small amounts of food and fluid.

## 2024-11-12 NOTE — ED ADULT NURSE REASSESSMENT NOTE - NS ED NURSE REASSESS COMMENT FT1
Break covering RN: patient received, appears to be resting comfortably in bed, no complaints noted at this time. Breathing even and unlabored, pallor/diaphoresis not noted. waiting for CT abd, will continue to monitor.
Order for 500 mL of lactated ringers, 1000 mL LRs hanged by nurse because there is no 500 mL bag available in ED, will administer only 500 mL of lactated ringers and discontinue, safety precautions maintained. Medications administered after IV line established by MILANA Russo.
Patient report given to MILANA Lozada for continuity of care, patient AAox4 and without distress.
Request made to MILANA Lott for assistance with establishing IV, after two attempts patient wanted another nurse, labs were drawn but no IV line, will administer medications once IV line is established.
Admitted Pt received from PREVIOUS RN on stretcher, A/Ox4 answers all questions appropriately. RN handoff provided by previous shift ED RN Damian to receiving RN Kierra. Pt denies chest pain and SOB during reassessment, breathing is even and unlabored on room air. Abdomen is soft and non-distended. Pt ambulates independently at baseline, moves all four extremities on command, skin is intact. Pt resting comfortably at the bedside, No apparent acute visible distress noted on reassessment. Vital signs stable, NKA to medications. Pending Pt transport to the floor

## 2024-11-12 NOTE — H&P ADULT - NSHPLABSRESULTS_GEN_ALL_CORE
LABS:                         13.9   6.01  )-----------( 127      ( 12 Nov 2024 14:30 )             42.9     11-12    139  |  103  |  28[H]  ----------------------------<  99  4.7   |  24  |  1.18    Ca    10.2      12 Nov 2024 14:30  Mg     2.50     11-12    TPro  7.7  /  Alb  3.9  /  TBili  0.4  /  DBili  x   /  AST  41[H]  /  ALT  29  /  AlkPhos  68  11-12      Urinalysis Basic - ( 12 Nov 2024 14:30 )    Color: x / Appearance: x / SG: x / pH: x  Gluc: 99 mg/dL / Ketone: x  / Bili: x / Urobili: x   Blood: x / Protein: x / Nitrite: x   Leuk Esterase: x / RBC: x / WBC x   Sq Epi: x / Non Sq Epi: x / Bacteria: x          RADIOLOGY, EKG & ADDITIONAL TESTS: LABS:                         13.9   6.01  )-----------( 127      ( 12 Nov 2024 14:30 )             42.9     11-12    139  |  103  |  28[H]  ----------------------------<  99  4.7   |  24  |  1.18    Ca    10.2      12 Nov 2024 14:30  Mg     2.50     11-12    TPro  7.7  /  Alb  3.9  /  TBili  0.4  /  DBili  x   /  AST  41[H]  /  ALT  29  /  AlkPhos  68  11-12      Urinalysis Basic - ( 12 Nov 2024 14:30 )    Color: x / Appearance: x / SG: x / pH: x  Gluc: 99 mg/dL / Ketone: x  / Bili: x / Urobili: x   Blood: x / Protein: x / Nitrite: x   Leuk Esterase: x / RBC: x / WBC x   Sq Epi: x / Non Sq Epi: x / Bacteria: x          RADIOLOGY, EKG & ADDITIONAL TESTS:     Xray Chest 1 View- PORTABLE-Urgent (11.12.24 @ 14:49)     IMPRESSION:  No focal consolidation.        CT Abdomen and Pelvis w/ IV Cont (11.12.24 @ 17:18)     IMPRESSION:  No CT evidence of acute abdominopelvic pathology.    Severe calcified and noncalcified plaques along the superior mesenteric   artery may predispose patient to chronic mesenteric ischemia; recommend   correlation with history and physical exam. .    -Personally INTERPRETED EKG: Atrially-sensed V-paced rhythm, 69bpm, QTc 486 - unchanged compared to 8/14/23    -Personally INTERPRETED CXR:     -Personally reviewed the following albs below:                         13.9   6.01  )-----------( 127      ( 12 Nov 2024 14:30 )             42.9     11-12    139  |  103  |  28[H]  ----------------------------<  99  4.7   |  24  |  1.18    Ca    10.2      12 Nov 2024 14:30  Mg     2.50     11-12    TPro  7.7  /  Alb  3.9  /  TBili  0.4  /  DBili  x   /  AST  41[H]  /  ALT  29  /  AlkPhos  68  11-12      Urinalysis Basic - ( 12 Nov 2024 14:30 )    Color: x / Appearance: x / SG: x / pH: x  Gluc: 99 mg/dL / Ketone: x  / Bili: x / Urobili: x   Blood: x / Protein: x / Nitrite: x   Leuk Esterase: x / RBC: x / WBC x   Sq Epi: x / Non Sq Epi: x / Bacteria: x          RADIOLOGY, EKG & ADDITIONAL TESTS:     Xray Chest 1 View- PORTABLE-Urgent (11.12.24 @ 14:49)     IMPRESSION:  No focal consolidation.        CT Abdomen and Pelvis w/ IV Cont (11.12.24 @ 17:18)     IMPRESSION:  No CT evidence of acute abdominopelvic pathology.    Severe calcified and noncalcified plaques along the superior mesenteric   artery may predispose patient to chronic mesenteric ischemia; recommend   correlation with history and physical exam. .    -Personally INTERPRETED EKG: Atrially-sensed V-paced rhythm, 69bpm, QTc 486 - unchanged compared to 8/14/23    -Personally INTERPRETED CXR: no consolidations, Left AICD, no pleural effusions, no pneumothorax    -Personally reviewed the following albs below:                         13.9   6.01  )-----------( 127      ( 12 Nov 2024 14:30 )             42.9     11-12    139  |  103  |  28[H]  ----------------------------<  99  4.7   |  24  |  1.18    Ca    10.2      12 Nov 2024 14:30  Mg     2.50     11-12    TPro  7.7  /  Alb  3.9  /  TBili  0.4  /  DBili  x   /  AST  41[H]  /  ALT  29  /  AlkPhos  68  11-12    Urinalysis Basic - ( 12 Nov 2024 14:30 )  Color: x / Appearance: x / SG: x / pH: x  Gluc: 99 mg/dL / Ketone: x  / Bili: x / Urobili: x   Blood: x / Protein: x / Nitrite: x   Leuk Esterase: x / RBC: x / WBC x   Sq Epi: x / Non Sq Epi: x / Bacteria: x        FINDINGS:  LOWER CHEST: Cardiomegaly with partially imaged cardiac leads. Small bibasilar atelectasis.  LIVER: Within normal limits.  BILE DUCTS: Normal caliber.  GALLBLADDER: Fluid-filled distended gallbladder is otherwise within normal limits.  SPLEEN: Within normal limits.  PANCREAS: Within normal limits.  ADRENALS: Within normal limits.  KIDNEYS/URETERS: Bilateral cysts and subcentimeter hypodensities too small otherwise characterize measuring up to 6.1 cm in the left upper pole. No hydronephrosis.  BLADDER: Within normal limits.  REPRODUCTIVE ORGANS: Uterus and adnexa within normal limits.  BOWEL: No bowel obstruction. Diverticulosis without evidence of diverticulitis. Appendix is normal. No abnormal areas of bowel wall enhancement.  PERITONEUM/RETROPERITONEUM: Within normal limits.  VESSELS: Mild diffuse atherosclerotic changes of the aorta. Severe calcified and noncalcified plaque along the superior mesenteric artery and its branches, which are poorly poorly evaluated on single venous acquisition.  LYMPH NODES: No lymphadenopathy.  ABDOMINAL WALL: Small fat-containing umbilical hernia.  BONES: Grade 1 anterolisthesis of L4 on L5. Degenerative changes of the spine.    IMPRESSION:  No CT evidence of acute abdominopelvic pathology.  Severe calcified and noncalcified plaques along the superior mesenteric artery may predispose patient to chronic mesenteric ischemia; recommend correlation with history and physical exam.

## 2024-11-12 NOTE — H&P ADULT - PROBLEM SELECTOR PLAN 6
- on home basal-bolus insulin  - need to discuss home meds with daughter and what current home regimen is on home basal-bolus insulin  c/w reduced home regime given abd pain     -Lantus 12u in am  -Admelog 3u TID pre meat  -DM diet  -A1c

## 2024-11-12 NOTE — ED PROVIDER NOTE - ATTENDING CONTRIBUTION TO CARE
Pt was seen and evaluated by me. Pt is a 78 y/o female with PMHx of CAD s/p stent and AICD, CHF, HTN, HLD, CKD, and PVD who presented to the ED for chest pain with SOB and cough X 1 wk. Pt states over the past week having chest pain with SOB and productive cough. Pt also notes having right sided abd pain. Pt admits to 2 days of nausea with vomiting. Pt denies any fever, chills, dysuria, or diarrhea. Pt was seen by her cardiologist today who sent pt in for further evaluation.   VITALS: Vitals have been reviewed.  GEN APPEARANCE: Alert and cooperative, non-toxic appearing and in NAD  HEAD: Atraumatic, normocephalic.   EYES: PERRL.   EARS: Gross hearing intact.   NOSE: No nasal discharge.   THROAT: MMM. Oral cavity and pharynx normal.   CV: RRR, S1S2, no c/r/m/g. No cyanosis or pallor.   LUNGS: CTAB. No wheezing. No rales. No rhonchi. No diminished breath sounds.   ABDOMEN: Soft, tender to RUQ and RLQ.   MSK/EXT: Spine appears normal, no spine point tenderness.   NEURO: Alert, follows commands. Speech normal. Sensation and motor normal x4 extremities.   SKIN: Normal color for race, warm, dry and intact. No evidence of rash.  78 y/o female with PMHx of CAD s/p stent and AICD, CHF, HTN, HLD, CKD, and PVD who presented to the ED for chest pain with SOB and cough X 1 wk.   Given concern for ACS, CHF, PNA  Will get EKG, labs, and CXR to evaluated for ACS, PNA, and CHF

## 2024-11-12 NOTE — PATIENT PROFILE ADULT - CONTRAINDICATIONS & PRECAUTIONS (SELECT ALL THAT APPLY)
This is a recent snapshot of the patient's [facility] medical record.  For current drug dose and complete information and questions, call [phone] or In Basket pool, [facility] ([id number])  CSN Number:  [id number]       none...

## 2024-11-12 NOTE — PATIENT PROFILE ADULT - FALL HARM RISK - HARM RISK INTERVENTIONS
Assistance with ambulation/Assistance OOB with selected safe patient handling equipment/Communicate Risk of Fall with Harm to all staff/Discuss with provider need for PT consult/Monitor for mental status changes/Monitor gait and stability/Move patient closer to nurses' station/Provide patient with walking aids - walker, cane, crutches/Reinforce activity limits and safety measures with patient and family/Reorient to person, place and time as needed/Review medications for side effects contributing to fall risk/Tailored Fall Risk Interventions/Visual Cue: Yellow wristband and red socks/Bed in lowest position, wheels locked, appropriate side rails in place/Call bell, personal items and telephone in reach/Instruct patient to call for assistance before getting out of bed or chair/Non-slip footwear when patient is out of bed/[place] to call system/Physically safe environment - no spills, clutter or unnecessary equipment/Purposeful Proactive Rounding/Room/bathroom lighting operational, light cord in reach

## 2024-11-12 NOTE — H&P ADULT - NSICDXPASTMEDICALHX_GEN_ALL_CORE_FT
PAST MEDICAL HISTORY:  Afib     Bradycardia     CAD (coronary artery disease) s/p stent of mLAD, rPDA, rPLS    Chronic congestive heart failure, unspecified congestive heart failure type s/p AICD    DM (diabetes mellitus)     DM (diabetes mellitus)     HLD (hyperlipidemia)     HLD (hyperlipidemia)     [place] (hard of hearing)     HTN (hypertension)     HTN (hypertension)     Hypertension

## 2024-11-12 NOTE — H&P ADULT - PROBLEM SELECTOR PLAN 7
- Cr. 1.18, wnl SCr baseline 1.2-1.4  Currently Scr= 1.18    -Renally dose medications   -Monitor renal function  -Avoid nephrotoxins

## 2024-11-12 NOTE — ED PROVIDER NOTE - PROGRESS NOTE DETAILS
Patient labs not actionable.  Will admit patient given increased dyspnea on exertion and chest pain.  Patient is agreeable with plan.

## 2024-11-12 NOTE — ED ADULT TRIAGE NOTE - CHIEF COMPLAINT QUOTE
Pt c/o shortness of breath, chills and chest pain x few days. Pt was at routine cardiology appointment and was found to be SOB, sent to ED for further eval. PMHx CAD w/ stent, CHF, AICD, DM, HTN, HLD

## 2024-11-12 NOTE — ED ADULT NURSE NOTE - NSICDXPASTMEDICALHX_GEN_ALL_CORE_FT
PAST MEDICAL HISTORY:  Afib     Bradycardia     CAD (coronary artery disease) s/p stent of mLAD, rPDA, rPLS    Chronic congestive heart failure, unspecified congestive heart failure type s/p AICD    DM (diabetes mellitus)     DM (diabetes mellitus)     HLD (hyperlipidemia)     HLD (hyperlipidemia)     [place] (hard of hearing)     HTN (hypertension)     HTN (hypertension)     Hypertension

## 2024-11-12 NOTE — ED PROVIDER NOTE - OBJECTIVE STATEMENT
79-year-old female with a history of CAD s/p stent and AICD, CHF, hypertension, hyperlipidemia, diabetes, CKD, lower extremity arterial sclerosis presenting with 1 week of progressively worsening anterior chest pain, shortness of breath with productive cough, generalized weakness and chills, RUQ and RLQ abdominal pain, dysuria and intermittent palpitations, and two days of nausea and NBNB vomiting.  Patient was sent by her cardiologist due to her shortness of breath/dyspnea on exertion.  Patient states her chest pain is worse when she touches the lower and middle portion of her sternum.  Denies syncope/presyncope, diarrhea, constipation, melena, hematochezia, hematuria, urinary frequency, flank pain. tolerating small amounts of food and fluid.

## 2024-11-12 NOTE — H&P ADULT - NSHPSOCIALHISTORY_GEN_ALL_CORE
Lives at home with daughter and grandchildren  Retired Tobacco use: Never smoked  EtOH use: None  Illicit drug use: None  Living situation: Lives at home with daughter and grandchildren  Work: Retired  ADLs: Full, ambulates with cane

## 2024-11-12 NOTE — ED PROVIDER NOTE - NSICDXPASTMEDICALHX_GEN_ALL_CORE_FT
PAST MEDICAL HISTORY:  Afib     Bradycardia     CAD (coronary artery disease) s/p stent of mLAD, rPDA, rPLS    Chronic congestive heart failure, unspecified congestive heart failure type s/p AICD    DM (diabetes mellitus)     DM (diabetes mellitus)     HLD (hyperlipidemia)     HLD (hyperlipidemia)     [place] (hard of hearing)     HTN (hypertension)     HTN (hypertension)     Hypertension

## 2024-11-12 NOTE — H&P ADULT - PROBLEM SELECTOR PLAN 9
DVT PPx: SCDs   E: replete K<4, Mg<2  Diet: Consistent carb diet  Code Status: Full  Dispo: pending medical improvement

## 2024-11-12 NOTE — H&P ADULT - PROBLEM SELECTOR PLAN 4
- s/p stents x 3  - on Aspirin  - c/w home crestor 40mg qhs and zetia 10mg daily  - c/w home toprol 25 mg daily s/p stents x 3  EKG: unchanged compared to prior, as above  No current CP or palpitations    - on Aspirin  - c/w home Crestor 40mg qhs and Zetia 10mg daily  - c/w home Toprol 25 mg daily

## 2024-11-12 NOTE — H&P ADULT - ATTENDING COMMENTS
78yo female with MHx of CAD s/p stents x 3 and AICD, CHF (EF=55%), hypertension, hyperlipidemia, diabetes, CKD, lower extremity arterial sclerosis a/w shortness of breath and RUQ pain.    Assessment and plan modified and supplemented where indicated.

## 2024-11-12 NOTE — H&P ADULT - TIME BILLING
Complex patient. Multiple acute and chronic conditions;   Reviewed admission imaging reports, and admission labs prior to the encounter with  the patient   Reviewed Triage and ED course/ documentation   Performed full physical exam and ROS  Obtained list of  multiple home medications and performed home medications reconciliation on EMR  Discussed prognosis, treatment and further imaging w/up re: lungs and GB with the patient  Ordered or reviewed new admission medications and placed or reviewed all hospitalization admission orders  Managed (continued, held or adjusted doses) of home medications   Ordered  or reviewed orders of  new imaging and new lab w/up  Requested new consultations including::: Gen Sx, Consider Pulmonology c/s

## 2024-11-12 NOTE — ED PROVIDER NOTE - CLINICAL SUMMARY MEDICAL DECISION MAKING FREE TEXT BOX
79-year-old female with a history of CAD s/p stent and AICD, CHF, hypertension, hyperlipidemia, diabetes, CKD, lower extremity arterial sclerosis presenting with 1 week of progressively worsening anterior chest pain, shortness of breath with productive cough, generalized weakness and chills, RUQ and RLQ abdominal pain, dysuria and intermittent palpitations, and two days of nausea and NBNB vomiting.  Patient was sent by her cardiologist due to her shortness of breath/dyspnea on exertion.  Patient is hemodynamically stable, afebrile.  Physical exam notable for fine bibasilar crackles, right upper quadrant and right lower quadrant drain abdominal pain without rebound.  Differential diagnosis includes but is not limited to UTI, appendicitis, cholecystitis, pneumonia, viral URI, ACS, gastritis. EKG showing borderline ST elevation in lead V3 and V4.  T wave inversions in aVL unchanged from prior EKG.  Biventricular paced. will obtain chest x-ray, CTAP as well as abdominal labs, cardiac labs and urinalysis to eval. 500cc fluid bolus given.

## 2024-11-12 NOTE — H&P ADULT - PROBLEM SELECTOR PLAN 3
- Chronic systolic (congestive) heart failure  - TTE 05/2024 showed normal left ventricular systolic function  with an ejection fraction of 56 %. Improved from EF of 20% in 2011.   - Patient w/ AICD and biventricular pacemaker  - hold home jardiance Chronic systolic (congestive) heart failure; Seems euvolemic; ProBNP wnl  - TTE 05/2024 showed normal left ventricular systolic function  with an ejection fraction of 56 %. Improved from EF of 20% in 2011.   - Patient w/ AICD and biventricular pacemaker    - Hold home Jardiance while inpt  - c/w BB, Spironolactone, Lisinopril  - Notify Dr. Hood of the admission (primary team)

## 2024-11-12 NOTE — H&P ADULT - PROBLEM SELECTOR PLAN 1
- patient w/ SOB, chest pain, productive cough  - EKG normal, trop wnl, ACS less likely   - chest XR normal   -  Small bibasilar atelectasis on CT  - hx. CHF, pro-BNP wnl  - SOB iso fever, chills, general malaise suggests possible viral illness  - f/u full RVP Unclear etiology;  Reviewed EMR: seems to be a chronic CO2 retainer based on VBG since 2017  Currently mildly hypercarbic  VBG - pH: 7.34  | pCO2: 55    | pO2: 28    | Lactate: 0.9    Euvolemic on exam, Pro-BNP wnl  CXR: no evidence of pleural effusions, PTX or consolidations.   Low suspicion for PE, O2 sat wnl   Full RVP negative   EKG: unchanged compared to prior as above     -CT chest non-con ordered  -f/u VBG , 10am labs  -f/u D-dimer, 10am labs  -Consider Pulmonology c/s pending CT chest results  -Telemetry   -Defer repeat TTE to cardiology (prior TTE, 5/2024, LVS function normal, EF= 56 %; LVD function)

## 2024-11-12 NOTE — H&P ADULT - ASSESSMENT
79 y.o. female with PMH of CAD s/p stents x 3 and AICD, CHF, hypertension, hyperlipidemia, diabetes, CKD, lower extremity arterial sclerosis presenting with 1 week of progressively worsening anterior chest pain, shortness of breath with productive cough, generalized weakness and chills, RLQ abdominal pain, dysuria and intermittent palpitations. Negative cardiac workup in ED, normal chest XR. Patient hemodynamically stable on presentation.  78yo female with MHx of CAD s/p stents x 3 and AICD, CHF (EF=55%), hypertension, hyperlipidemia, diabetes, CKD, lower extremity arterial sclerosis a/w shortness of breath and RUQ pain

## 2024-11-12 NOTE — H&P ADULT - PROBLEM SELECTOR PLAN 2
- RUQ pain  - CT Abdomen and Pelvis w/ IV Cont reveals no acute pathology.  Fluid-filled distended gallbladder, Severe calcified and noncalcified plaques along the superior mesenteric artery may predispose patient to chronic mesenteric ischemic  - lactate wnl  - f/u RUQ US to r/o cholelithiasis vs. cholecystitis Acute RUQ pain and nausea.  CT Abdomen and Pelvis w/ IV Cont: Fluid-filled distended gallbladder,   Lactate wnl    - f/u RUQ US to r/o cholelithiasis vs. early cholecystitis  -f/u HIDA scan   -Requested Gen Sx c/s  -Monitor for fever

## 2024-11-12 NOTE — H&P ADULT - NSHPREVIEWOFSYSTEMS_GEN_ALL_CORE
REVIEW OF SYSTEMS:    CONSTITUTIONAL: No weakness, fevers or chills  EYES/ENT: No visual changes;  No vertigo or throat pain   NECK: No pain or stiffness  RESPIRATORY: No cough, wheezing, hemoptysis; No shortness of breath  CARDIOVASCULAR: No chest pain or palpitations  GASTROINTESTINAL: No abdominal or epigastric pain. No nausea, vomiting, or hematemesis; No diarrhea or constipation. No melena or hematochezia.  GENITOURINARY: No dysuria, frequency or hematuria  NEUROLOGICAL: No numbness or weakness  SKIN: No itching, rashes  MUSCULOSKELETAL: No joint pain, muscle pain. REVIEW OF SYSTEMS:    CONSTITUTIONAL: Weakness, tactile fevers or chills  EYES/ENT: No visual changes. Throat pain.  NECK: Pain. No stiffness  RESPIRATORY: SOB, cough (productive of clear sputum). No wheezing, hemoptysis.  CARDIOVASCULAR: Chest pain, palpitations, orthopnea   GASTROINTESTINAL: Abdominal pain. No nausea, vomiting, or hematemesis; No diarrhea. Constipation (last BM 3 days ago). No melena or hematochezia.  GENITOURINARY: Dysuria, frequency. No hematuria  NEUROLOGICAL: Numbness/tingling b/l UE, LE  SKIN: No itching, rashes  MUSCULOSKELETAL: No joint pain. Neck pain. REVIEW OF SYSTEMS:    CONSTITUTIONAL: Weakness, tactile fevers or chills  EYES/ENT: No visual changes. Throat pain.  NECK: Pain. No stiffness  RESPIRATORY: SOB, cough (productive of clear sputum). No wheezing, hemoptysis.  CARDIOVASCULAR: Chest pain, palpitations, orthopnea   GASTROINTESTINAL: +Abdominal pain. No nausea, vomiting, or hematemesis; No diarrhea. Constipation (last BM 3 days ago). No melena or hematochezia.  GENITOURINARY: Dysuria, frequency. No hematuria  NEUROLOGICAL: Numbness/tingling b/l UE, LE  SKIN: No itching, rashes  MUSCULOSKELETAL: No joint pain. Neck pain.

## 2024-11-12 NOTE — H&P ADULT - PROBLEM SELECTOR PLAN 5
- c/w  home losartan 50 mg and spironolactone 25 mg - c/w  home losartan 50 mg and spironolactone 25 mg, Metoprolol

## 2024-11-13 DIAGNOSIS — I50.9 HEART FAILURE, UNSPECIFIED: ICD-10-CM

## 2024-11-13 DIAGNOSIS — R10.11 RIGHT UPPER QUADRANT PAIN: ICD-10-CM

## 2024-11-13 DIAGNOSIS — R10.9 UNSPECIFIED ABDOMINAL PAIN: ICD-10-CM

## 2024-11-13 DIAGNOSIS — M94.0 CHONDROCOSTAL JUNCTION SYNDROME [TIETZE]: ICD-10-CM

## 2024-11-13 DIAGNOSIS — I10 ESSENTIAL (PRIMARY) HYPERTENSION: ICD-10-CM

## 2024-11-13 DIAGNOSIS — R06.02 SHORTNESS OF BREATH: ICD-10-CM

## 2024-11-13 DIAGNOSIS — I25.10 ATHEROSCLEROTIC HEART DISEASE OF NATIVE CORONARY ARTERY WITHOUT ANGINA PECTORIS: ICD-10-CM

## 2024-11-13 DIAGNOSIS — Z29.9 ENCOUNTER FOR PROPHYLACTIC MEASURES, UNSPECIFIED: ICD-10-CM

## 2024-11-13 DIAGNOSIS — E78.5 HYPERLIPIDEMIA, UNSPECIFIED: ICD-10-CM

## 2024-11-13 DIAGNOSIS — R07.9 CHEST PAIN, UNSPECIFIED: ICD-10-CM

## 2024-11-13 DIAGNOSIS — E11.9 TYPE 2 DIABETES MELLITUS WITHOUT COMPLICATIONS: ICD-10-CM

## 2024-11-13 DIAGNOSIS — N18.9 CHRONIC KIDNEY DISEASE, UNSPECIFIED: ICD-10-CM

## 2024-11-13 LAB
A1C WITH ESTIMATED AVERAGE GLUCOSE RESULT: 6.5 % — HIGH (ref 4–5.6)
ADD ON TEST-SPECIMEN IN LAB: SIGNIFICANT CHANGE UP
ADD ON TEST-SPECIMEN IN LAB: SIGNIFICANT CHANGE UP
ANION GAP SERPL CALC-SCNC: 9 MMOL/L — SIGNIFICANT CHANGE UP (ref 7–14)
B PERT DNA SPEC QL NAA+PROBE: SIGNIFICANT CHANGE UP
B PERT+PARAPERT DNA PNL SPEC NAA+PROBE: SIGNIFICANT CHANGE UP
BASOPHILS # BLD AUTO: 0.05 K/UL — SIGNIFICANT CHANGE UP (ref 0–0.2)
BASOPHILS NFR BLD AUTO: 1 % — SIGNIFICANT CHANGE UP (ref 0–2)
BLOOD GAS VENOUS COMPREHENSIVE RESULT: SIGNIFICANT CHANGE UP
BUN SERPL-MCNC: 27 MG/DL — HIGH (ref 7–23)
C PNEUM DNA SPEC QL NAA+PROBE: SIGNIFICANT CHANGE UP
CALCIUM SERPL-MCNC: 9.7 MG/DL — SIGNIFICANT CHANGE UP (ref 8.4–10.5)
CHLORIDE SERPL-SCNC: 109 MMOL/L — HIGH (ref 98–107)
CO2 SERPL-SCNC: 24 MMOL/L — SIGNIFICANT CHANGE UP (ref 22–31)
CREAT SERPL-MCNC: 1.29 MG/DL — SIGNIFICANT CHANGE UP (ref 0.5–1.3)
D DIMER BLD IA.RAPID-MCNC: 597 NG/ML DDU — HIGH
EGFR: 42 ML/MIN/1.73M2 — LOW
EOSINOPHIL # BLD AUTO: 0.14 K/UL — SIGNIFICANT CHANGE UP (ref 0–0.5)
EOSINOPHIL NFR BLD AUTO: 2.8 % — SIGNIFICANT CHANGE UP (ref 0–6)
ESTIMATED AVERAGE GLUCOSE: 140 — SIGNIFICANT CHANGE UP
FLUAV SUBTYP SPEC NAA+PROBE: SIGNIFICANT CHANGE UP
FLUBV RNA SPEC QL NAA+PROBE: SIGNIFICANT CHANGE UP
GLUCOSE BLDC GLUCOMTR-MCNC: 108 MG/DL — HIGH (ref 70–99)
GLUCOSE BLDC GLUCOMTR-MCNC: 99 MG/DL — SIGNIFICANT CHANGE UP (ref 70–99)
GLUCOSE SERPL-MCNC: 111 MG/DL — HIGH (ref 70–99)
HADV DNA SPEC QL NAA+PROBE: SIGNIFICANT CHANGE UP
HCOV 229E RNA SPEC QL NAA+PROBE: SIGNIFICANT CHANGE UP
HCOV HKU1 RNA SPEC QL NAA+PROBE: SIGNIFICANT CHANGE UP
HCOV NL63 RNA SPEC QL NAA+PROBE: SIGNIFICANT CHANGE UP
HCOV OC43 RNA SPEC QL NAA+PROBE: SIGNIFICANT CHANGE UP
HCT VFR BLD CALC: 40.9 % — SIGNIFICANT CHANGE UP (ref 34.5–45)
HGB BLD-MCNC: 13.3 G/DL — SIGNIFICANT CHANGE UP (ref 11.5–15.5)
HMPV RNA SPEC QL NAA+PROBE: SIGNIFICANT CHANGE UP
HPIV1 RNA SPEC QL NAA+PROBE: SIGNIFICANT CHANGE UP
HPIV2 RNA SPEC QL NAA+PROBE: SIGNIFICANT CHANGE UP
HPIV3 RNA SPEC QL NAA+PROBE: SIGNIFICANT CHANGE UP
HPIV4 RNA SPEC QL NAA+PROBE: SIGNIFICANT CHANGE UP
IANC: 2.67 K/UL — SIGNIFICANT CHANGE UP (ref 1.8–7.4)
IMM GRANULOCYTES NFR BLD AUTO: 0.2 % — SIGNIFICANT CHANGE UP (ref 0–0.9)
LYMPHOCYTES # BLD AUTO: 1.15 K/UL — SIGNIFICANT CHANGE UP (ref 1–3.3)
LYMPHOCYTES # BLD AUTO: 23 % — SIGNIFICANT CHANGE UP (ref 13–44)
M PNEUMO DNA SPEC QL NAA+PROBE: SIGNIFICANT CHANGE UP
MAGNESIUM SERPL-MCNC: 2.1 MG/DL — SIGNIFICANT CHANGE UP (ref 1.6–2.6)
MCHC RBC-ENTMCNC: 31.7 PG — SIGNIFICANT CHANGE UP (ref 27–34)
MCHC RBC-ENTMCNC: 32.5 G/DL — SIGNIFICANT CHANGE UP (ref 32–36)
MCV RBC AUTO: 97.4 FL — SIGNIFICANT CHANGE UP (ref 80–100)
MONOCYTES # BLD AUTO: 0.99 K/UL — HIGH (ref 0–0.9)
MONOCYTES NFR BLD AUTO: 19.8 % — HIGH (ref 2–14)
NEUTROPHILS # BLD AUTO: 2.67 K/UL — SIGNIFICANT CHANGE UP (ref 1.8–7.4)
NEUTROPHILS NFR BLD AUTO: 53.2 % — SIGNIFICANT CHANGE UP (ref 43–77)
NRBC # BLD: 0 /100 WBCS — SIGNIFICANT CHANGE UP (ref 0–0)
NRBC # FLD: 0 K/UL — SIGNIFICANT CHANGE UP (ref 0–0)
PHOSPHATE SERPL-MCNC: 3.6 MG/DL — SIGNIFICANT CHANGE UP (ref 2.5–4.5)
PLATELET # BLD AUTO: 120 K/UL — LOW (ref 150–400)
POTASSIUM SERPL-MCNC: 4.5 MMOL/L — SIGNIFICANT CHANGE UP (ref 3.5–5.3)
POTASSIUM SERPL-SCNC: 4.5 MMOL/L — SIGNIFICANT CHANGE UP (ref 3.5–5.3)
PROCALCITONIN SERPL-MCNC: 0.12 NG/ML — HIGH (ref 0.02–0.1)
RAPID RVP RESULT: SIGNIFICANT CHANGE UP
RBC # BLD: 4.2 M/UL — SIGNIFICANT CHANGE UP (ref 3.8–5.2)
RBC # FLD: 14.5 % — SIGNIFICANT CHANGE UP (ref 10.3–14.5)
RSV RNA SPEC QL NAA+PROBE: SIGNIFICANT CHANGE UP
RV+EV RNA SPEC QL NAA+PROBE: SIGNIFICANT CHANGE UP
SARS-COV-2 RNA SPEC QL NAA+PROBE: SIGNIFICANT CHANGE UP
SODIUM SERPL-SCNC: 142 MMOL/L — SIGNIFICANT CHANGE UP (ref 135–145)
WBC # BLD: 5.01 K/UL — SIGNIFICANT CHANGE UP (ref 3.8–10.5)
WBC # FLD AUTO: 5.01 K/UL — SIGNIFICANT CHANGE UP (ref 3.8–10.5)

## 2024-11-13 PROCEDURE — 99221 1ST HOSP IP/OBS SF/LOW 40: CPT | Mod: GC

## 2024-11-13 PROCEDURE — 99232 SBSQ HOSP IP/OBS MODERATE 35: CPT | Mod: GC

## 2024-11-13 PROCEDURE — 76705 ECHO EXAM OF ABDOMEN: CPT | Mod: 26

## 2024-11-13 RX ORDER — LOSARTAN POTASSIUM 25 MG/1
50 TABLET ORAL DAILY
Refills: 0 | Status: DISCONTINUED | OUTPATIENT
Start: 2024-11-13 | End: 2024-11-14

## 2024-11-13 RX ORDER — INSULIN LISPRO 100/ML
VIAL (ML) SUBCUTANEOUS AT BEDTIME
Refills: 0 | Status: DISCONTINUED | OUTPATIENT
Start: 2024-11-13 | End: 2024-11-14

## 2024-11-13 RX ORDER — INSULIN GLARGINE,HUM.REC.ANLOG 100/ML
12 VIAL (ML) SUBCUTANEOUS ONCE
Refills: 0 | Status: COMPLETED | OUTPATIENT
Start: 2024-11-13 | End: 2024-11-13

## 2024-11-13 RX ORDER — EZETIMIBE 10 MG/1
10 TABLET ORAL AT BEDTIME
Refills: 0 | Status: DISCONTINUED | OUTPATIENT
Start: 2024-11-13 | End: 2024-11-14

## 2024-11-13 RX ORDER — ASPIRIN/MAG CARB/ALUMINUM AMIN 325 MG
81 TABLET ORAL DAILY
Refills: 0 | Status: DISCONTINUED | OUTPATIENT
Start: 2024-11-13 | End: 2024-11-14

## 2024-11-13 RX ORDER — METOPROLOL TARTRATE 50 MG
25 TABLET ORAL DAILY
Refills: 0 | Status: DISCONTINUED | OUTPATIENT
Start: 2024-11-13 | End: 2024-11-13

## 2024-11-13 RX ORDER — FAMOTIDINE 10 MG/ML
20 INJECTION INTRAVENOUS DAILY
Refills: 0 | Status: DISCONTINUED | OUTPATIENT
Start: 2024-11-13 | End: 2024-11-14

## 2024-11-13 RX ORDER — HEPARIN SODIUM 10000 [USP'U]/ML
5000 INJECTION INTRAVENOUS; SUBCUTANEOUS EVERY 8 HOURS
Refills: 0 | Status: DISCONTINUED | OUTPATIENT
Start: 2024-11-13 | End: 2024-11-14

## 2024-11-13 RX ORDER — INSULIN LISPRO 100/ML
3 VIAL (ML) SUBCUTANEOUS
Refills: 0 | Status: DISCONTINUED | OUTPATIENT
Start: 2024-11-13 | End: 2024-11-14

## 2024-11-13 RX ORDER — LOSARTAN POTASSIUM 25 MG/1
50 TABLET ORAL DAILY
Refills: 0 | Status: DISCONTINUED | OUTPATIENT
Start: 2024-11-13 | End: 2024-11-13

## 2024-11-13 RX ORDER — INSULIN LISPRO 100/ML
VIAL (ML) SUBCUTANEOUS
Refills: 0 | Status: DISCONTINUED | OUTPATIENT
Start: 2024-11-13 | End: 2024-11-14

## 2024-11-13 RX ORDER — SPIRONOLACTONE 100 MG
25 TABLET ORAL DAILY
Refills: 0 | Status: DISCONTINUED | OUTPATIENT
Start: 2024-11-13 | End: 2024-11-14

## 2024-11-13 RX ORDER — INSULIN GLARGINE,HUM.REC.ANLOG 100/ML
12 VIAL (ML) SUBCUTANEOUS AT BEDTIME
Refills: 0 | Status: DISCONTINUED | OUTPATIENT
Start: 2024-11-14 | End: 2024-11-14

## 2024-11-13 RX ORDER — ACETAMINOPHEN 500 MG
650 TABLET ORAL EVERY 6 HOURS
Refills: 0 | Status: DISCONTINUED | OUTPATIENT
Start: 2024-11-13 | End: 2024-11-14

## 2024-11-13 RX ORDER — POLYETHYLENE GLYCOL 3350 17 G/17G
17 POWDER, FOR SOLUTION ORAL DAILY
Refills: 0 | Status: DISCONTINUED | OUTPATIENT
Start: 2024-11-13 | End: 2024-11-14

## 2024-11-13 RX ORDER — METOPROLOL TARTRATE 50 MG
25 TABLET ORAL DAILY
Refills: 0 | Status: DISCONTINUED | OUTPATIENT
Start: 2024-11-13 | End: 2024-11-14

## 2024-11-13 RX ORDER — GLUCAGON INJECTION, SOLUTION 1 MG/.2ML
1 INJECTION, SOLUTION SUBCUTANEOUS ONCE
Refills: 0 | Status: DISCONTINUED | OUTPATIENT
Start: 2024-11-13 | End: 2024-11-14

## 2024-11-13 RX ORDER — LIDOCAINE HYDROCHLORIDE 40 MG/ML
1 SOLUTION TOPICAL DAILY
Refills: 0 | Status: DISCONTINUED | OUTPATIENT
Start: 2024-11-13 | End: 2024-11-14

## 2024-11-13 RX ADMIN — Medication 3 UNIT(S): at 17:11

## 2024-11-13 RX ADMIN — HEPARIN SODIUM 5000 UNIT(S): 10000 INJECTION INTRAVENOUS; SUBCUTANEOUS at 13:31

## 2024-11-13 RX ADMIN — Medication 650 MILLIGRAM(S): at 17:15

## 2024-11-13 RX ADMIN — EZETIMIBE 10 MILLIGRAM(S): 10 TABLET ORAL at 21:35

## 2024-11-13 RX ADMIN — Medication 40 MILLIGRAM(S): at 21:35

## 2024-11-13 RX ADMIN — Medication 650 MILLIGRAM(S): at 17:55

## 2024-11-13 RX ADMIN — Medication 12 UNIT(S): at 06:35

## 2024-11-13 RX ADMIN — HEPARIN SODIUM 5000 UNIT(S): 10000 INJECTION INTRAVENOUS; SUBCUTANEOUS at 21:39

## 2024-11-13 RX ADMIN — Medication 3 UNIT(S): at 07:46

## 2024-11-13 RX ADMIN — Medication 650 MILLIGRAM(S): at 23:18

## 2024-11-13 NOTE — CONSULT NOTE ADULT - SUBJECTIVE AND OBJECTIVE BOX
General Surgery Consult  Consulting surgical team: B  Consulting attending: Dr. Harvey    HPI:  79F hx CAD s/p stents x 3 and AICD, CHF (EF=55%), hypertension, hyperlipidemia, diabetes, CKD, lower extremity arterial sclerosis, sent in by cardiologist for chest pain and dyspnea on exertion. Patient reports many non-specific complaints, including chest pain, shortness of breath, generalized weakness and subjective fevers, neck pain, back pain, and leg pain. Patient currently admitted to medicine with further work-up pending, including chest CT for chest pain iso unchanged EKG w/ trops and PBNP wnl.     Surgery consulted to r/o acute cholecystitis iso CTAP w/ fluid-filled gallbladder w/o cholelithiasis or pericholecystic fluid. Patient reports nonspecific right hemiabdominal pain that she states has been present for months to years. Denies PO intolerance. Reports she has been using the bathroom, but she is unsure when she last passed stool or flatus. Denies nausea or emesis. Denies chills. Denies hx of abdominal surgery.    PAST MEDICAL HISTORY:  CAD (coronary artery disease)    DM (diabetes mellitus)    HTN (hypertension)    HLD (hyperlipidemia)    Chronic congestive heart failure, unspecified congestive heart failure type    HTN (hypertension)    DM (diabetes mellitus)    Afib    HLD (hyperlipidemia)    Hypertension    Diabetes    Bradycardia    [place] (hard of hearing)        PAST SURGICAL HISTORY:  AICD (automatic cardioverter/defibrillator) present    History of heart artery stent    Pacemaker        MEDICATIONS:  acetaminophen     Tablet .. 650 milliGRAM(s) Oral every 6 hours PRN  aspirin enteric coated 81 milliGRAM(s) Oral daily  atorvastatin 40 milliGRAM(s) Oral at bedtime  dextrose 5%. 1000 milliLiter(s) IV Continuous <Continuous>  dextrose 5%. 1000 milliLiter(s) IV Continuous <Continuous>  dextrose 50% Injectable 25 Gram(s) IV Push once  dextrose 50% Injectable 12.5 Gram(s) IV Push once  dextrose 50% Injectable 25 Gram(s) IV Push once  dextrose Oral Gel 15 Gram(s) Oral once PRN  ezetimibe 10 milliGRAM(s) Oral at bedtime  famotidine    Tablet 20 milliGRAM(s) Oral daily  glucagon  Injectable 1 milliGRAM(s) IntraMuscular once  heparin   Injectable 5000 Unit(s) SubCutaneous every 8 hours  influenza  Vaccine (HIGH DOSE) 0.5 milliLiter(s) IntraMuscular once  insulin lispro (ADMELOG) corrective regimen sliding scale   SubCutaneous three times a day before meals  insulin lispro (ADMELOG) corrective regimen sliding scale   SubCutaneous at bedtime  insulin lispro Injectable (ADMELOG) 3 Unit(s) SubCutaneous three times a day before meals  losartan 50 milliGRAM(s) Oral daily  melatonin 3 milliGRAM(s) Oral at bedtime PRN  metoprolol succinate ER 25 milliGRAM(s) Oral daily  ondansetron Injectable 4 milliGRAM(s) IV Push every 8 hours PRN  polyethylene glycol 3350 17 Gram(s) Oral daily PRN  spironolactone 25 milliGRAM(s) Oral daily      ALLERGIES:  No Known Allergies      VITALS & I/Os:  Vital Signs Last 24 Hrs  T(C): 37 (13 Nov 2024 05:25), Max: 37.2 (12 Nov 2024 12:32)  T(F): 98.6 (13 Nov 2024 05:25), Max: 98.9 (12 Nov 2024 12:32)  HR: 70 (13 Nov 2024 05:25) (59 - 75)  BP: 117/60 (13 Nov 2024 05:25) (112/59 - 150/63)  BP(mean): --  RR: 18 (13 Nov 2024 05:25) (17 - 19)  SpO2: 98% (13 Nov 2024 05:25) (95% - 100%)    Parameters below as of 13 Nov 2024 05:25  Patient On (Oxygen Delivery Method): room air        I&O's Summary      PHYSICAL EXAM:  General: Not acutely distressed  Respiratory: Nonlabored respirations  Cardiovascular: Pulse present  Abdominal: Soft, nondistended, mild right hemiabdominal discomfort. Espinosa's sign absent. No rebound or guarding. No organomegaly, no palpable mass  Extremities: Warm    LABS:                        13.3   5.01  )-----------( 120      ( 13 Nov 2024 04:23 )             40.9     11-13    142  |  109[H]  |  27[H]  ----------------------------<  111[H]  4.5   |  24  |  1.29    Ca    9.7      13 Nov 2024 04:23  Phos  3.6     11-13  Mg     2.10     11-13    TPro  7.7  /  Alb  3.9  /  TBili  0.4  /  DBili  x   /  AST  41[H]  /  ALT  29  /  AlkPhos  68  11-12    Lactate:  11-12 @ 14:30  0.9              Urinalysis Basic - ( 13 Nov 2024 04:23 )    Color: x / Appearance: x / SG: x / pH: x  Gluc: 111 mg/dL / Ketone: x  / Bili: x / Urobili: x   Blood: x / Protein: x / Nitrite: x   Leuk Esterase: x / RBC: x / WBC x   Sq Epi: x / Non Sq Epi: x / Bacteria: x        IMAGING:  < from: CT Abdomen and Pelvis w/ IV Cont (11.12.24 @ 17:18) >    ACC: [id number] EXAM:  CT ABDOMEN AND PELVIS IC   ORDERED BY: TOMASZ SHEN     PROCEDURE DATE:  11/12/2024          INTERPRETATION:  CLINICAL INFORMATION: Right-sided abdominal pain nausea.    COMPARISON: CT abdomen pelvis 1/17/2024.    CONTRAST/COMPLICATIONS:  IV Contrast: Omnipaque 350  90 cc administered   10 cc discarded  Oral Contrast: NONE  Complications: None reported at time of study completion    PROCEDURE:  CT of the Abdomen and Pelvis was performed.  Sagittal and coronal reformats were performed.    FINDINGS:  LOWER CHEST: Cardiomegaly with partially imaged cardiac leads. Small   bibasilar atelectasis.    LIVER: Within normal limits.  BILE DUCTS: Normal caliber.  GALLBLADDER: Fluid-filled distended gallbladder is otherwisewithin   normal limits.  SPLEEN: Within normal limits.  PANCREAS: Within normal limits.  ADRENALS: Within normal limits.  KIDNEYS/URETERS: Bilateral cysts and subcentimeter hypodensities too   small otherwise characterize measuring up to 6.1 cm in the left upper   pole. No hydronephrosis.    BLADDER: Within normal limits.  REPRODUCTIVE ORGANS: Uterus and adnexa within normal limits.    BOWEL: No bowel obstruction. Diverticulosis without evidence of   diverticulitis. Appendix is normal. No abnormal areas of bowel wall   enhancement.  PERITONEUM/RETROPERITONEUM: Within normal limits.  VESSELS: Mild diffuse atherosclerotic changes of the aorta. Severe   calcified and noncalcified plaque along the superior mesenteric artery   and its branches, which are poorly poorly evaluated on single venous   acquisition.  LYMPH NODES: No lymphadenopathy.  ABDOMINAL WALL: Small fat-containing umbilical hernia.  BONES: Grade 1 anterolisthesis of L4 on L5. Degenerative changes of the   spine.    IMPRESSION:  No CT evidence of acute abdominopelvic pathology.    Severe calcified and noncalcified plaques along the superior mesenteric   artery may predispose patient to chronic mesenteric ischemia; recommend   correlation with history and physical exam.    --- End of Report ---          ALBERTO FIERRO MD; Resident Radiologist  This document has been electronically signed.  TALI VICENTE MD; Attending Radiologist  This document has been electronically signed. Nov 12 2024  6:02PM    < end of copied text >

## 2024-11-13 NOTE — PROGRESS NOTE ADULT - SUBJECTIVE AND OBJECTIVE BOX
Curt Ya | MS3  Internal Medicine    OVERNIGHT EVENTS: no overnight events      SUBJECTIVE: 79y y/o patient was examined at bedside this morning. Appeared comfortable. Overnight vitals and monitoring results were reviewed. Reporting no complaints. Denied chest pain, SOB, abdominal pain, vomiting, diarrhea, constipation.       MEDICATIONS  (STANDING):  aspirin enteric coated 81 milliGRAM(s) Oral daily  atorvastatin 40 milliGRAM(s) Oral at bedtime  dextrose 5%. 1000 milliLiter(s) (100 mL/Hr) IV Continuous <Continuous>  dextrose 5%. 1000 milliLiter(s) (50 mL/Hr) IV Continuous <Continuous>  dextrose 50% Injectable 12.5 Gram(s) IV Push once  dextrose 50% Injectable 25 Gram(s) IV Push once  dextrose 50% Injectable 25 Gram(s) IV Push once  ezetimibe 10 milliGRAM(s) Oral at bedtime  famotidine    Tablet 20 milliGRAM(s) Oral daily  glucagon  Injectable 1 milliGRAM(s) IntraMuscular once  heparin   Injectable 5000 Unit(s) SubCutaneous every 8 hours  influenza  Vaccine (HIGH DOSE) 0.5 milliLiter(s) IntraMuscular once  insulin lispro (ADMELOG) corrective regimen sliding scale   SubCutaneous three times a day before meals  insulin lispro (ADMELOG) corrective regimen sliding scale   SubCutaneous at bedtime  insulin lispro Injectable (ADMELOG) 3 Unit(s) SubCutaneous three times a day before meals  losartan 50 milliGRAM(s) Oral daily  metoprolol succinate ER 25 milliGRAM(s) Oral daily  spironolactone 25 milliGRAM(s) Oral daily    MEDICATIONS  (PRN):  acetaminophen     Tablet .. 650 milliGRAM(s) Oral every 6 hours PRN Temp greater or equal to 38C (100.4F), Mild Pain (1 - 3)  dextrose Oral Gel 15 Gram(s) Oral once PRN Blood Glucose LESS THAN 70 milliGRAM(s)/deciliter  melatonin 3 milliGRAM(s) Oral at bedtime PRN Insomnia  ondansetron Injectable 4 milliGRAM(s) IV Push every 8 hours PRN Nausea and/or Vomiting  polyethylene glycol 3350 17 Gram(s) Oral daily PRN Constipation        T(F): 98.6 (11-13-24 @ 05:25), Max: 98.9 (11-12-24 @ 12:32)  HR: 70 (11-13-24 @ 05:25) (59 - 75)  BP: 117/60 (11-13-24 @ 05:25) (112/59 - 150/63)  BP(mean): --  RR: 18 (11-13-24 @ 05:25) (17 - 19)  SpO2: 98% (11-13-24 @ 05:25) (95% - 100%)    PHYSICAL EXAM:     GENERAL: NAD, lying in bed comfortably  HEAD:  Atraumatic, Normocephalic  EYES: EOMI, PERRLA, conjunctiva and sclera clear, no nystagmus noted  ENT: Moist mucous membranes,   NECK: trachea midline  CHEST/LUNG: Clear to auscultation bilaterally. Unlabored respirations  HEART: Regular rate and rhythm  ABDOMEN:  Soft, nontender, nondistended,  EXTREMITIES:  2+ Peripheral Pulses. No clubbing, cyanosis, or edema  MSK: No gross deformities noted   Neurological:  A&Ox3, no focal deficits   SKIN: No rashes or lesions  PSYCH: Normal mood, affect     TELEMETRY:    LABS:                        13.3   5.01  )-----------( 120      ( 13 Nov 2024 04:23 )             40.9     11-13    142  |  109[H]  |  27[H]  ----------------------------<  111[H]  4.5   |  24  |  1.29    Ca    9.7      13 Nov 2024 04:23  Phos  3.6     11-13  Mg     2.10     11-13    TPro  7.7  /  Alb  3.9  /  TBili  0.4  /  DBili  x   /  AST  41[H]  /  ALT  29  /  AlkPhos  68  11-12          Blood Gas Profile w/Lytes - Venous: Performed In Lab (11-12-24 @ 14:30)    Creatinine Trend: 1.29<--, 1.18<--  I&O's Summary    BNP  Blood Gas Profile w/Lytes - Venous: Performed In Lab (11-12-24 @ 14:30)      RADIOLOGY & ADDITIONAL STUDIES:             Curt Ya | MS3  Internal Medicine    OVERNIGHT EVENTS: no overnight events      SUBJECTIVE: 79y y/o patient was examined at bedside this morning. Appeared comfortable. Overnight vitals and monitoring results were reviewed. Patient main concern was inferior sternal pain tender to palpation as well as right sided rib pain. Patient also endorsed "black" colored stools for the past few months. Denies exertional chest pain, chills. Describes the chest pain as constant and not changing with meals.     MEDICATIONS  (STANDING):  aspirin enteric coated 81 milliGRAM(s) Oral daily  atorvastatin 40 milliGRAM(s) Oral at bedtime  dextrose 5%. 1000 milliLiter(s) (100 mL/Hr) IV Continuous <Continuous>  dextrose 5%. 1000 milliLiter(s) (50 mL/Hr) IV Continuous <Continuous>  dextrose 50% Injectable 12.5 Gram(s) IV Push once  dextrose 50% Injectable 25 Gram(s) IV Push once  dextrose 50% Injectable 25 Gram(s) IV Push once  ezetimibe 10 milliGRAM(s) Oral at bedtime  famotidine    Tablet 20 milliGRAM(s) Oral daily  glucagon  Injectable 1 milliGRAM(s) IntraMuscular once  heparin   Injectable 5000 Unit(s) SubCutaneous every 8 hours  influenza  Vaccine (HIGH DOSE) 0.5 milliLiter(s) IntraMuscular once  insulin lispro (ADMELOG) corrective regimen sliding scale   SubCutaneous three times a day before meals  insulin lispro (ADMELOG) corrective regimen sliding scale   SubCutaneous at bedtime  insulin lispro Injectable (ADMELOG) 3 Unit(s) SubCutaneous three times a day before meals  losartan 50 milliGRAM(s) Oral daily  metoprolol succinate ER 25 milliGRAM(s) Oral daily  spironolactone 25 milliGRAM(s) Oral daily    MEDICATIONS  (PRN):  acetaminophen     Tablet .. 650 milliGRAM(s) Oral every 6 hours PRN Temp greater or equal to 38C (100.4F), Mild Pain (1 - 3)  dextrose Oral Gel 15 Gram(s) Oral once PRN Blood Glucose LESS THAN 70 milliGRAM(s)/deciliter  melatonin 3 milliGRAM(s) Oral at bedtime PRN Insomnia  ondansetron Injectable 4 milliGRAM(s) IV Push every 8 hours PRN Nausea and/or Vomiting  polyethylene glycol 3350 17 Gram(s) Oral daily PRN Constipation        T(F): 98.6 (11-13-24 @ 05:25), Max: 98.9 (11-12-24 @ 12:32)  HR: 70 (11-13-24 @ 05:25) (59 - 75)  BP: 117/60 (11-13-24 @ 05:25) (112/59 - 150/63)  BP(mean): --  RR: 18 (11-13-24 @ 05:25) (17 - 19)  SpO2: 98% (11-13-24 @ 05:25) (95% - 100%)    PHYSICAL EXAM:     GENERAL: NAD, lying in bed comfortably  HEAD:  Atraumatic, Normocephalic  EYES: EOMI, PERRLA, conjunctiva and sclera clear, no nystagmus noted  ENT: Moist mucous membranes,   NECK: trachea midline  CHEST/LUNG: Clear to auscultation bilaterally. Unlabored respirations. No increased work of breathing. Pain to palpation in inferior sternum. Pain in right lower ribs.   HEART: Regular rate and rhythm  ABDOMEN:  Soft, nontender, nondistended,  EXTREMITIES:  2+ Peripheral Pulses. No clubbing, cyanosis, or edema  MSK: No gross deformities noted   Neurological:  A&Ox3, no focal deficits   SKIN: No rashes or lesions  PSYCH: Normal mood, affect     TELEMETRY:    LABS:                        13.3   5.01  )-----------( 120      ( 13 Nov 2024 04:23 )             40.9     11-13    142  |  109[H]  |  27[H]  ----------------------------<  111[H]  4.5   |  24  |  1.29    Ca    9.7      13 Nov 2024 04:23  Phos  3.6     11-13  Mg     2.10     11-13    TPro  7.7  /  Alb  3.9  /  TBili  0.4  /  DBili  x   /  AST  41[H]  /  ALT  29  /  AlkPhos  68  11-12          Blood Gas Profile w/Lytes - Venous: Performed In Lab (11-12-24 @ 14:30)    Blood Gas Profile - Venous (11.13.24 @ 09:25)   pH, Venous: 7.37  pCO2, Venous: 43 mmHg  pO2, Venous: 99 mmHg  HCO3, Venous: 25 mmol/L  Base Excess, Venous: -0.6 mmol/L  Oxygen Saturation, Venous: 97.9 %  Total CO2, Venous: 26 mmol/L  Blood Gas Profile - Venous (11.12.24 @ 14:30)   pH, Venous: 7.34  pCO2, Venous: 55 mmHg  pO2, Venous: 28 mmHg  HCO3, Venous: 30 mmol/L  Base Excess, Venous: 2.6 mmol/L  Oxygen Saturation, Venous: 39.7 %  Total CO2, Venous: 31 mmol/L    Creatinine Trend: 1.29<--, 1.18<--  I&O's Summary    BNP  Blood Gas Profile w/Lytes - Venous: Performed In Lab (11-12-24 @ 14:30)      RADIOLOGY & ADDITIONAL STUDIES:    < from: US Abdomen Limited (11.13.24 @ 08:50) >    FINDINGS:    Liver: The visualized liver is within normal limits.  Bile ducts: Normal caliber. Common bile duct measures 6 mm.  Gallbladder: Within normal limits. No stones or gallbladder wall   thickening. No sonographic Espinosa's sign.  Pancreas: Visualized portions are within normal limits.    IMPRESSION:  No sonographic evidence of acute cholecystitis.    < end of copied text >  < from: CT Abdomen and Pelvis w/ IV Cont (11.12.24 @ 17:18) >  FINDINGS:  LOWER CHEST: Cardiomegaly with partially imaged cardiac leads. Small   bibasilar atelectasis.    LIVER: Within normal limits.  BILE DUCTS: Normal caliber.  GALLBLADDER: Fluid-filled distended gallbladder is otherwisewithin   normal limits.  SPLEEN: Within normal limits.  PANCREAS: Within normal limits.  ADRENALS: Within normal limits.  KIDNEYS/URETERS: Bilateral cysts and subcentimeter hypodensities too   small otherwise characterize measuring up to 6.1 cm in the left upper   pole. No hydronephrosis.    BLADDER: Within normal limits.  REPRODUCTIVE ORGANS: Uterus and adnexa within normal limits.    BOWEL: No bowel obstruction. Diverticulosis without evidence of   diverticulitis. Appendix is normal. No abnormal areas of bowel wall   enhancement.  PERITONEUM/RETROPERITONEUM: Within normal limits.  VESSELS: Mild diffuse atherosclerotic changes of the aorta. Severe   calcified and noncalcified plaque along the superior mesenteric artery   and its branches, which are poorly poorly evaluated on single venous   acquisition.  LYMPH NODES: No lymphadenopathy.  ABDOMINAL WALL: Small fat-containing umbilical hernia.  BONES: Grade 1 anterolisthesis of L4 on L5. Degenerative changes of the   spine.    IMPRESSION:  No CT evidence of acute abdominopelvic pathology.    Severe calcified and noncalcified plaques along the superior mesenteric   artery may predispose patient to chronic mesenteric ischemia; recommend   correlation with history and physical exam.    < end of copied text >  < from: Xray Chest 1 View- PORTABLE-Urgent (11.12.24 @ 14:49) >  COMPARISON: Chest radiographfrom 2/13/2018    FINDINGS:  Left-sided biventricular AICD with leads intact.  No focal consolidation.  There is no pleural effusion or pneumothorax.  The heart shows left ventricular prominence.  The visualized osseous structures demonstrate no acute pathology.    IMPRESSION:  No focal consolidation.    < end of copied text >             Curt Ya | MS3  Internal Medicine    OVERNIGHT EVENTS: no overnight events    SUBJECTIVE: 79y y/o patient was examined at bedside this morning. Appeared comfortable. Overnight vitals and monitoring results were reviewed. Patient main concern was inferior sternal pain tender to palpation as well as right sided rib and RUQ pain which are exacerbated by death inspiration. Patient also endorsed "black" colored stools for the past few months, which upon clarification, were dark brown rather than melena. Denies exertional chest pain, chills. Describes the chest pain as constant and not changing with meals.    Brief daily plan:  - Tylenol and Lidocaine patch for musculoskeletal pain  - AICD interrogation by EP cards    MEDICATIONS  (STANDING):  aspirin enteric coated 81 milliGRAM(s) Oral daily  atorvastatin 40 milliGRAM(s) Oral at bedtime  dextrose 5%. 1000 milliLiter(s) (100 mL/Hr) IV Continuous <Continuous>  dextrose 5%. 1000 milliLiter(s) (50 mL/Hr) IV Continuous <Continuous>  dextrose 50% Injectable 12.5 Gram(s) IV Push once  dextrose 50% Injectable 25 Gram(s) IV Push once  dextrose 50% Injectable 25 Gram(s) IV Push once  ezetimibe 10 milliGRAM(s) Oral at bedtime  famotidine    Tablet 20 milliGRAM(s) Oral daily  glucagon  Injectable 1 milliGRAM(s) IntraMuscular once  heparin   Injectable 5000 Unit(s) SubCutaneous every 8 hours  influenza  Vaccine (HIGH DOSE) 0.5 milliLiter(s) IntraMuscular once  insulin lispro (ADMELOG) corrective regimen sliding scale   SubCutaneous three times a day before meals  insulin lispro (ADMELOG) corrective regimen sliding scale   SubCutaneous at bedtime  insulin lispro Injectable (ADMELOG) 3 Unit(s) SubCutaneous three times a day before meals  losartan 50 milliGRAM(s) Oral daily  metoprolol succinate ER 25 milliGRAM(s) Oral daily  spironolactone 25 milliGRAM(s) Oral daily    MEDICATIONS  (PRN):  acetaminophen     Tablet .. 650 milliGRAM(s) Oral every 6 hours PRN Temp greater or equal to 38C (100.4F), Mild Pain (1 - 3)  dextrose Oral Gel 15 Gram(s) Oral once PRN Blood Glucose LESS THAN 70 milliGRAM(s)/deciliter  melatonin 3 milliGRAM(s) Oral at bedtime PRN Insomnia  ondansetron Injectable 4 milliGRAM(s) IV Push every 8 hours PRN Nausea and/or Vomiting  polyethylene glycol 3350 17 Gram(s) Oral daily PRN Constipation        T(F): 98.6 (11-13-24 @ 05:25), Max: 98.9 (11-12-24 @ 12:32)  HR: 70 (11-13-24 @ 05:25) (59 - 75)  BP: 117/60 (11-13-24 @ 05:25) (112/59 - 150/63)  BP(mean): --  RR: 18 (11-13-24 @ 05:25) (17 - 19)  SpO2: 98% (11-13-24 @ 05:25) (95% - 100%)    PHYSICAL EXAM:     GENERAL: NAD, lying in bed comfortably  HEAD:  Atraumatic, Normocephalic  EYES: EOMI, conjunctiva and sclera clear, no nystagmus noted  CHEST/LUNG: Clear to auscultation bilaterally. Unlabored respirations. No increased work of breathing. Pain to palpation in inferior sternum. Pain in right lower ribs.   HEART: Regular rate and rhythm  ABDOMEN:  RUQ pain. Soft, nondistended. Bowel sounds present  EXTREMITIES:  2+ Peripheral Pulses. No clubbing, cyanosis, or edema  MSK: No gross deformities noted   Neurological: A&Ox3, no focal deficits   SKIN: No rashes or lesions  PSYCH: Normal mood, affect     TELEMETRY:    LABS:                        13.3   5.01  )-----------( 120      ( 13 Nov 2024 04:23 )             40.9     11-13    142  |  109[H]  |  27[H]  ----------------------------<  111[H]  4.5   |  24  |  1.29    Ca    9.7      13 Nov 2024 04:23  Phos  3.6     11-13  Mg     2.10     11-13    TPro  7.7  /  Alb  3.9  /  TBili  0.4  /  DBili  x   /  AST  41[H]  /  ALT  29  /  AlkPhos  68  11-12          Blood Gas Profile w/Lytes - Venous: Performed In Lab (11-12-24 @ 14:30)    Blood Gas Profile - Venous (11.13.24 @ 09:25)   pH, Venous: 7.37  pCO2, Venous: 43 mmHg  pO2, Venous: 99 mmHg  HCO3, Venous: 25 mmol/L  Base Excess, Venous: -0.6 mmol/L  Oxygen Saturation, Venous: 97.9 %  Total CO2, Venous: 26 mmol/L  Blood Gas Profile - Venous (11.12.24 @ 14:30)   pH, Venous: 7.34  pCO2, Venous: 55 mmHg  pO2, Venous: 28 mmHg  HCO3, Venous: 30 mmol/L  Base Excess, Venous: 2.6 mmol/L  Oxygen Saturation, Venous: 39.7 %  Total CO2, Venous: 31 mmol/L    Creatinine Trend: 1.29<--, 1.18<--  I&O's Summary    BNP  Blood Gas Profile w/Lytes - Venous: Performed In Lab (11-12-24 @ 14:30)      RADIOLOGY & ADDITIONAL STUDIES:    < from: US Abdomen Limited (11.13.24 @ 08:50) >    FINDINGS:    Liver: The visualized liver is within normal limits.  Bile ducts: Normal caliber. Common bile duct measures 6 mm.  Gallbladder: Within normal limits. No stones or gallbladder wall   thickening. No sonographic Espinosa's sign.  Pancreas: Visualized portions are within normal limits.    IMPRESSION:  No sonographic evidence of acute cholecystitis.    < end of copied text >  < from: CT Abdomen and Pelvis w/ IV Cont (11.12.24 @ 17:18) >  FINDINGS:  LOWER CHEST: Cardiomegaly with partially imaged cardiac leads. Small   bibasilar atelectasis.    LIVER: Within normal limits.  BILE DUCTS: Normal caliber.  GALLBLADDER: Fluid-filled distended gallbladder is otherwisewithin   normal limits.  SPLEEN: Within normal limits.  PANCREAS: Within normal limits.  ADRENALS: Within normal limits.  KIDNEYS/URETERS: Bilateral cysts and subcentimeter hypodensities too   small otherwise characterize measuring up to 6.1 cm in the left upper   pole. No hydronephrosis.    BLADDER: Within normal limits.  REPRODUCTIVE ORGANS: Uterus and adnexa within normal limits.    BOWEL: No bowel obstruction. Diverticulosis without evidence of   diverticulitis. Appendix is normal. No abnormal areas of bowel wall   enhancement.  PERITONEUM/RETROPERITONEUM: Within normal limits.  VESSELS: Mild diffuse atherosclerotic changes of the aorta. Severe   calcified and noncalcified plaque along the superior mesenteric artery   and its branches, which are poorly poorly evaluated on single venous   acquisition.  LYMPH NODES: No lymphadenopathy.  ABDOMINAL WALL: Small fat-containing umbilical hernia.  BONES: Grade 1 anterolisthesis of L4 on L5. Degenerative changes of the   spine.    IMPRESSION:  No CT evidence of acute abdominopelvic pathology.    Severe calcified and noncalcified plaques along the superior mesenteric   artery may predispose patient to chronic mesenteric ischemia; recommend   correlation with history and physical exam.    < end of copied text >  < from: Xray Chest 1 View- PORTABLE-Urgent (11.12.24 @ 14:49) >  COMPARISON: Chest radiographfrom 2/13/2018    FINDINGS:  Left-sided biventricular AICD with leads intact.  No focal consolidation.  There is no pleural effusion or pneumothorax.  The heart shows left ventricular prominence.  The visualized osseous structures demonstrate no acute pathology.    IMPRESSION:  No focal consolidation.    < end of copied text >

## 2024-11-13 NOTE — PROGRESS NOTE ADULT - PROBLEM SELECTOR PLAN 7
SCr baseline 1.2-1.4  Currently Scr= 1.18    -Renally dose medications   -Monitor renal function  -Avoid nephrotoxins - c/w home Crestor and Zetia

## 2024-11-13 NOTE — CONSULT NOTE ADULT - ATTENDING COMMENTS
Additional History: Pt has 2 spots of concern today.
I have reviewed the history, pertinent labs and imaging, and discussed the care with the consult resident.  More than 50% of this 55 minute encounter including face to face with the patient was spent counseling and/or coordination of care on chest pain.  Time included review of vitals, labs, imaging, discussion with consultants and coordination with the operating room/emergency department.    78yo F presenting with chest pain. CT abd pelvis with mildly dilated GB. US without stones. Not tender in RUQ. Low likelihood of cholecystitis with current information.     - No acute intervention   - Care per medicine     The active issues are:  1. chest pain     The Acute Care Surgery (B Team) Attending Group Practice:  Dr. Alma Harvey    urgent issues - spectra [id number]  nonurgent issues - [phone]  patient appointments or afterhours - [phone]

## 2024-11-13 NOTE — PROGRESS NOTE ADULT - PROBLEM SELECTOR PLAN 8
- c/w home Crestor and Zetia DVT PPx: SCDs   E: replete K<4, Mg<2  Diet: Consistent carb diet  Code Status: Full  Dispo: pending medical improvement DVT PPx: SCDs   E: replete K<4, Mg<2  Diet: DASH, soft diet  Code Status: Full  Dispo: pending medical improvement

## 2024-11-13 NOTE — PROGRESS NOTE ADULT - PROBLEM SELECTOR PLAN 6
on home basal-bolus insulin  c/w reduced home regime given abd pain     -Lantus 12u in am  -Admelog 3u TID pre meat  -DM diet  -A1c SCr baseline 1.2-1.4  Currently Scr= 1.18    -Renally dose medications   -Monitor renal function  -Avoid nephrotoxins

## 2024-11-13 NOTE — PROGRESS NOTE ADULT - PROBLEM SELECTOR PLAN 3
Chronic systolic (congestive) heart failure; Seems euvolemic; ProBNP wnl  - TTE 05/2024 showed normal left ventricular systolic function  with an ejection fraction of 56 %. Improved from EF of 20% in 2011.   - Patient w/ AICD and biventricular pacemaker    - Hold home Jardiance while inpt  - c/w BB, Spironolactone, Lisinopril  - Notify Dr. Hood of the admission (primary team) s/p stents x 3. Last stent placed 2016.   EKG: unchanged compared to prior, as above  No current CP or palpitations    - on Aspirin  - c/w home Crestor 40mg qhs and Zetia 10mg daily  - c/w home Toprol 25 mg daily

## 2024-11-13 NOTE — CONSULT NOTE ADULT - ASSESSMENT
79F sent in for CP and GOMES with several non-specific symptoms. Surgery consulted to r/o acute cholecystitis. CT w/ evidence of GB distended w/ fluid, enlarged compared to baseline, however without secondary signs of cholecystitis.    Recommendations:  -Exam and findings less consistent with cholecystitis. No acute operative intervention at this time.  -HIDA and RUQ US ordered by primary team, surgery will f/u results thereof.    Discussed with Dr. Harvey.    B Team Surgery  Please page [phone] for all questions.

## 2024-11-13 NOTE — PROCEDURE NOTE - ADDITIONAL PROCEDURE DETAILS
Appropriate pacing and sensing.  Excellent capture threshold.   Noted to have brief episodes of AT. Most recent episode was on 11/12/2024 and the longest was 10 seconds on 11/9/2024  No reprogramming done

## 2024-11-13 NOTE — PROGRESS NOTE ADULT - PROBLEM SELECTOR PLAN 4
s/p stents x 3  EKG: unchanged compared to prior, as above  No current CP or palpitations    - on Aspirin  - c/w home Crestor 40mg qhs and Zetia 10mg daily  - c/w home Toprol 25 mg daily - c/w  home losartan 50 mg and spironolactone 25 mg, Metoprolol

## 2024-11-13 NOTE — PROGRESS NOTE ADULT - ASSESSMENT
78yo female with MHx of CAD s/p stents x 3 and AICD, CHF (EF=55%), hypertension, hyperlipidemia, diabetes, CKD, lower extremity arterial sclerosis a/w shortness of breath and RUQ pain 78yo female with MHx of CAD s/p stents x 3 and AICD, CHF (EF=55%), hypertension, hyperlipidemia, diabetes, CKD, lower extremity arterial sclerosis a/w shortness of breath and RUQ pain consistent with costochondritis.

## 2024-11-13 NOTE — PROGRESS NOTE ADULT - PROBLEM SELECTOR PLAN 5
- c/w  home losartan 50 mg and spironolactone 25 mg, Metoprolol on home basal-bolus insulin  c/w reduced home regime given abd pain     -Lantus 12u in am  -Admelog 3u TID pre meat  -DM diet  -A1c

## 2024-11-13 NOTE — PROGRESS NOTE ADULT - PROBLEM SELECTOR PROBLEM 1
Shortness of breath There are no preventive care reminders to display for this patient.    Patient is up to date, no discussion needed.           Costochondritis

## 2024-11-13 NOTE — PROGRESS NOTE ADULT - PROBLEM SELECTOR PLAN 2
Acute RUQ pain and nausea.  CT Abdomen and Pelvis w/ IV Cont: Fluid-filled distended gallbladder,   Lactate wnl    - f/u RUQ US to r/o cholelithiasis vs. early cholecystitis  -f/u HIDA scan   -Requested Gen Sx c/s  -Monitor for fever Chronic systolic (congestive) heart failure; Seems euvolemic; ProBNP wnl. Patient follows with cardiologist Dr. Kun Chambers.  - TTE 05/2024 showed normal left ventricular systolic function  with an ejection fraction of 56 %. Improved from EF of 20% in 2011.   - Patient w/ AICD and biventricular pacemaker    Plan  - Hold home Jardiance while inpt  - c/w BB, Spironolactone, Lisinopril  - Notify Dr. Hood of the admission (primary team)  - EP interrogation Chronic systolic (congestive) heart failure; Seems euvolemic; ProBNP wnl. Patient follows with cardiologist Dr. Kun Chambers.  - TTE 05/2024 showed normal left ventricular systolic function  with an ejection fraction of 56 %. Improved from EF of 20% in 2011.   - Patient w/ AICD and biventricular pacemaker    Plan  - Hold home Jardiance while inpt  - c/w BB, Spironolactone, Lisinopril  - EP Cards interrogation of AICD      - [employer] Fa[employer]o Defibrillator, Quadra Assura MP per patient's outpatient cardiologist  - Notify Dr. Hood of the admission (primary team)  - EP interrogation

## 2024-11-13 NOTE — PROGRESS NOTE ADULT - PROBLEM SELECTOR PLAN 1
Unclear etiology;  Reviewed EMR: seems to be a chronic CO2 retainer based on VBG since 2017  Currently mildly hypercarbic  VBG - pH: 7.34  | pCO2: 55    | pO2: 28    | Lactate: 0.9    Euvolemic on exam, Pro-BNP wnl  CXR: no evidence of pleural effusions, PTX or consolidations.   Low suspicion for PE, O2 sat wnl   Full RVP negative   EKG: unchanged compared to prior as above     -CT chest non-con ordered  -f/u VBG , 10am labs  -f/u D-dimer, 10am labs  -Consider Pulmonology c/s pending CT chest results  -Telemetry   -Defer repeat TTE to cardiology (prior TTE, 5/2024, LVS function normal, EF= 56 %; LVD function) Patient with SOB, R lower rib pain, and inferior sternal pain. Sternal pain worsened by palpation, nonexertional, and not related to meals. EKG unchanged. Troponin 23 (11/12). VBG was benign (11/13). Viral panel negative.  Euvolemic on exam and no bibasilar crackles. CXR negative. D-dimer elevated at 597, but O2 SAT normal on RA and Wells' Score=0. Rib pain vs RUQ pain c/f biliary disease, but no fever, leukocytosis and RUQ ultrasound benign. Sonographic Espinosa's sign is negative.     Plan  - Tylenol for pain  - PT Patient with SOB, R lower rib pain, and inferior sternal pain. Sternal pain worsened by palpation and deep inspiration, nonexertional, and not related to meals. EKG unchanged. Troponin 23 (11/12). VBG was benign (11/13). Viral panel negative.  Euvolemic on exam and no bibasilar crackles. CXR negative. D-dimer elevated at 597, but O2 SAT normal on RA and Wells' Score=0. Rib pain vs RUQ pain c/f biliary disease, but no fever, leukocytosis and RUQ ultrasound benign. Sonographic Espinosa's sign is negative.     Plan  - Tylenol and lidocaine patch for pain  - PT

## 2024-11-14 ENCOUNTER — TRANSCRIPTION ENCOUNTER (OUTPATIENT)
Age: 79
End: 2024-11-14

## 2024-11-14 VITALS
DIASTOLIC BLOOD PRESSURE: 70 MMHG | HEART RATE: 64 BPM | SYSTOLIC BLOOD PRESSURE: 144 MMHG | OXYGEN SATURATION: 98 % | RESPIRATION RATE: 18 BRPM | TEMPERATURE: 98 F

## 2024-11-14 PROCEDURE — 99239 HOSP IP/OBS DSCHRG MGMT >30: CPT | Mod: GC

## 2024-11-14 RX ORDER — ACETAMINOPHEN 500 MG
2 TABLET ORAL
Qty: 112 | Refills: 0
Start: 2024-11-14 | End: 2024-11-27

## 2024-11-14 RX ORDER — LIDOCAINE HYDROCHLORIDE 40 MG/ML
1 SOLUTION TOPICAL
Qty: 2 | Refills: 0
Start: 2024-11-14 | End: 2024-11-23

## 2024-11-14 RX ORDER — LIDOCAINE HYDROCHLORIDE 40 MG/ML
1 SOLUTION TOPICAL
Qty: 14 | Refills: 0
Start: 2024-11-14 | End: 2024-11-27

## 2024-11-14 RX ADMIN — Medication 81 MILLIGRAM(S): at 11:47

## 2024-11-14 RX ADMIN — HEPARIN SODIUM 5000 UNIT(S): 10000 INJECTION INTRAVENOUS; SUBCUTANEOUS at 14:15

## 2024-11-14 RX ADMIN — LIDOCAINE HYDROCHLORIDE 1 PATCH: 40 SOLUTION TOPICAL at 11:49

## 2024-11-14 RX ADMIN — Medication 3 UNIT(S): at 11:50

## 2024-11-14 RX ADMIN — HEPARIN SODIUM 5000 UNIT(S): 10000 INJECTION INTRAVENOUS; SUBCUTANEOUS at 05:03

## 2024-11-14 RX ADMIN — Medication 3 UNIT(S): at 17:32

## 2024-11-14 RX ADMIN — Medication 650 MILLIGRAM(S): at 00:18

## 2024-11-14 RX ADMIN — Medication 25 MILLIGRAM(S): at 05:03

## 2024-11-14 RX ADMIN — Medication 650 MILLIGRAM(S): at 05:02

## 2024-11-14 RX ADMIN — Medication 650 MILLIGRAM(S): at 11:47

## 2024-11-14 RX ADMIN — Medication 3 UNIT(S): at 07:55

## 2024-11-14 RX ADMIN — FAMOTIDINE 20 MILLIGRAM(S): 10 INJECTION INTRAVENOUS at 11:47

## 2024-11-14 RX ADMIN — LOSARTAN POTASSIUM 50 MILLIGRAM(S): 25 TABLET ORAL at 05:03

## 2024-11-14 NOTE — PROGRESS NOTE ADULT - PROBLEM SELECTOR PLAN 5
on home basal-bolus insulin  c/w reduced home regime given abd pain     -Lantus 12u in am  -Admelog 3u TID pre meat  -DM diet  -A1c

## 2024-11-14 NOTE — PROGRESS NOTE ADULT - TIME BILLING
Time spent includes direct patient care  (interview and examination of patient), discussion with other providers, support staff and/or patient's family members, review of medical records, ordering diagnostic tests and analyzing results, and documentation. Time spent was exclusive of teaching residents.
Time spent includes direct patient care  (interview and examination of patient), discussion with other providers, support staff and/or patient's family members, review of medical records, ordering diagnostic tests and analyzing results, and documentation. Time spent was exclusive of teaching residents.

## 2024-11-14 NOTE — PROGRESS NOTE ADULT - PROBLEM SELECTOR PLAN 6
SCr baseline 1.2-1.4  Currently Scr= 1.18    -Renally dose medications   -Monitor renal function  -Avoid nephrotoxins

## 2024-11-14 NOTE — DISCHARGE NOTE PROVIDER - NSDCCPTREATMENT_GEN_ALL_CORE_FT
PRINCIPAL PROCEDURE  Procedure: CT abdomen and pelvis  Findings and Treatment: FINDINGS:  LOWER CHEST: Cardiomegaly with partially imaged cardiac leads. Small   bibasilar atelectasis.  LIVER: Within normal limits.  BILE DUCTS: Normal caliber.  GALLBLADDER: Fluid-filled distended gallbladder is otherwisewithin   normal limits.  SPLEEN: Within normal limits.  PANCREAS: Within normal limits.  ADRENALS: Within normal limits.  KIDNEYS/URETERS: Bilateral cysts and subcentimeter hypodensities too   small otherwise characterize measuring up to 6.1 cm in the left upper   pole. No hydronephrosis.  BLADDER: Within normal limits.  REPRODUCTIVE ORGANS: Uterus and adnexa within normal limits.  BOWEL: No bowel obstruction. Diverticulosis without evidence of   diverticulitis. Appendix is normal. No abnormal areas of bowel wall   enhancement.  PERITONEUM/RETROPERITONEUM: Within normal limits.  VESSELS: Mild diffuse atherosclerotic changes of the aorta. Severe   calcified and noncalcified plaque along the superior mesenteric artery   and its branches, which are poorly poorly evaluated on single venous   acquisition.  LYMPH NODES: No lymphadenopathy.  ABDOMINAL WALL: Small fat-containing umbilical hernia.  BONES: Grade 1 anterolisthesis of L4 on L5. Degenerative changes of the   spine.  IMPRESSION:  No CT evidence of acute abdominopelvic pathology.  Severe calcified and noncalcified plaques along the superior mesenteric   artery may predispose patient to chronic mesenteric ischemia; recommend   correlation with history and physical exam.

## 2024-11-14 NOTE — PHYSICAL THERAPY INITIAL EVALUATION ADULT - ADDITIONAL COMMENTS
Patient lives in a private home with her daughter with 4 steps to enter with bilateral handrails. Bedroom/bathroom is on the first floor, no further steps to negotiate. Patient is ambulatory with a straight cane, reports 2 falls within the last 6 months, was receiving home PT prior to admission.    Patient left in bed, NAD, all lines and tubes intact, bed alarm on, call bell within reach, head of bed elevated >30 degrees, RN Ermelinda aware of PT

## 2024-11-14 NOTE — DISCHARGE NOTE PROVIDER - CARE PROVIDER_API CALL
Mook Krishnan  Internal Medicine  [address] [place] [address]  Phone: [phone]  Fax: [phone]  Established Patient  Follow Up Time: 2 weeks    Kun Chambers  Adv [facility]  [id number] [place] [address] - [place] [address]  Phone: [phone]  Fax: [phone]  Established Patient  Follow Up Time: 2 weeks

## 2024-11-14 NOTE — DISCHARGE NOTE PROVIDER - NSDCFUSCHEDAPPT_GEN_ALL_CORE_FT
Joey Casey  [facility] Physician [facility]  VASCULAR [address]   Scheduled Appointment: 12/13/2024    [facility] [address]  Scheduled Appointment: 01/24/2025     Joey Casey  [facility] Physician [facility]  VASCULAR [address]  Scheduled Appointment: 12/13/2024    [facility]  ELECTROPH [id number]  Scheduled Appointment: 01/24/2025    Mook Krishnan  [facility] Physician [facility]  INTMED [address]  Scheduled Appointment: 02/14/2025     Joey Casey  [facility]  VASCULAR [address]  Scheduled Appointment: 12/13/2024    [facility]  ELECTROPH [id number]  Scheduled Appointment: 01/24/2025    Kun Chambers  [facility]  HEARTFAIL [address]  Scheduled Appointment: 02/05/2025    Mook Krishnan  [facility]  INTMED [address]  Scheduled Appointment: 02/14/2025     [facility]  INTMED [address]  Scheduled Appointment: 11/25/2024    Joey Casey  [facility]  VASCULAR [address]  Scheduled Appointment: 12/13/2024    [facility]  ELECTROPH [id number]  Scheduled Appointment: 01/24/2025    Kun Chambers  [facility]  HEARTFAIL [address]  Scheduled Appointment: 02/05/2025    Mook Krishnan  [facility]  INTMED [address]  Scheduled Appointment: 02/14/2025

## 2024-11-14 NOTE — PROGRESS NOTE ADULT - ATTENDING COMMENTS
79yr old woman PMH CAD s/p stents x 3 (last in 2016) HFrEF s/p AICD, HTN/HLD, Type 2 DM (A1C 6.5), CKD 3, lower extremity arterial sclerosis p/w right sided rib and sternal pain that is reproducible to palpation leading to SOB. Likely due to costochondritis.    Pain and SOB improved with supportive management: c/w tylenol and lidocaine patch, no NSAIDs given CKD  PT: no needs    Stable for DC home
79yr old woman PMH CAD s/p stents x 3 (last in 2016) HFrEF s/p AICD, HTN/HLD, Type 2 DM (A1C 6.5), CKD 3, lower extremity arterial sclerosis p/w right sided rib and sternal pain leading to SOB.    Pain is reproducible on ribs and sternum suggesting costochondritis: supportive management with tylenol and lidocaine patch, no NSAIDs given CKD  Wells score low, Ddimer nonspecific, no other symptoms of PE, hold off CTA chest. ProBNP low, CXR and RVP negative  AICD interrogation by EP with appropriate pacing and sensing, noted to have brief episodes of AT, most recent episode 11/12/2024  Less likely acute cholecystitis given no fevers or leukocytosis, negative Espinosa's sign. RUQ US negative, Can dc HIDA scan  PT eval    Anticipate DC home on 11/14 if pain improved

## 2024-11-14 NOTE — DISCHARGE NOTE NURSING/CASE MANAGEMENT/SOCIAL WORK - NSDCFUADDAPPT_GEN_ALL_CORE_FT
APPTS ARE READY TO BE MADE: [X] YES    Best Family or Patient Contact (if needed):    Additional Information about above appointments (if needed):    1: PCP Dr. Krishnan within 1-2 weeks of discharge  2: Cardiologist Dr. Chambers within 1-2 weeks of discharge  3:     Other comments or requests:

## 2024-11-14 NOTE — DISCHARGE NOTE PROVIDER - PROVIDER TOKENS
PROVIDER:[TOKEN:[[id number]],FOLLOWUP:[2 weeks],ESTABLISHEDPATIENT:[T]],PROVIDER:[TOKEN:[[id number]],FOLLOWUP:[2 weeks],ESTABLISHEDPATIENT:[T]]

## 2024-11-14 NOTE — DISCHARGE NOTE PROVIDER - CARE PROVIDERS DIRECT ADDRESSES
[email or web address]

## 2024-11-14 NOTE — PROGRESS NOTE ADULT - ASSESSMENT
78yo female with MHx of CAD s/p stents x 3 and AICD, CHF (EF=55%), hypertension, hyperlipidemia, diabetes, CKD, lower extremity arterial sclerosis a/w shortness of breath and RUQ pain consistent with costochondritis.

## 2024-11-14 NOTE — DISCHARGE NOTE PROVIDER - NSRESEARCHGRANT_OVERRIDEREC_GEN_A_CORE
Anesthesia ROS/Med Hx    Overall Review:    Pt. has no history of anesthetic complications    Pulmonary Review:    Negative for pulmonary    Neuro/Psych Review:    Pt. positive for headaches    GI/HEPATIC/RENAL Review:    Pt. negative for GI/Hepatic/Renal ROS    End/Other Review:  Endo/Other Review Comments:   Dysmenorrhea  Leiomyoma of uterus    Anesthesia Plan  ASA Status: 2  Anesthesia Type: General  Induction: Intravenous  Reviewed: Lab Results, EKG, Chest X-Rays, NPO Status, Allergies, Medications, Problem List and Past Med History  The proposed anesthetic plan, including its risks and benefits, have been discussed with the Patient - along with the risks and benefits of alternatives.  Questions were encouraged and answered and the patient and/or representative agrees to proceed.  Blood Products: Not Anticipated      Physical Exam  Mallampati: II  TM Distance: >3 FB  Neck ROM: Full  Cardio Rhythm: Regular  Cardio Rate: Normal  cardiovascular exam normal  Breath sounds clear to auscultation:  Yes  pulmonary exam normal  abdominal exam normal  dental exam normal      
IMPROVE-DD Application Not Available

## 2024-11-14 NOTE — DISCHARGE NOTE NURSING/CASE MANAGEMENT/SOCIAL WORK - FINANCIAL ASSISTANCE
[facility] provides services at a reduced cost to those who are determined to be eligible through [facility]’s financial assistance program. Information regarding [facility]’s financial assistance program can be found by going to [email or web address] or by calling [phone].

## 2024-11-14 NOTE — PROGRESS NOTE ADULT - PROBLEM SELECTOR PLAN 3
s/p stents x 3. Last stent placed 2016.   EKG: unchanged compared to prior, as above  No current CP or palpitations    - on Aspirin  - c/w home Crestor 40mg qhs and Zetia 10mg daily  - c/w home Toprol 25 mg daily

## 2024-11-14 NOTE — PROGRESS NOTE ADULT - PROBLEM SELECTOR PLAN 2
Chronic systolic (congestive) heart failure; Seems euvolemic; ProBNP wnl. Patient follows with cardiologist Dr. Kun Chambers.  - TTE 05/2024 showed normal left ventricular systolic function  with an ejection fraction of 56 %. Improved from EF of 20% in 2011.   - Patient w/ AICD and biventricular pacemaker    Plan  - Hold home Jardiance while inpt  - c/w BB, Spironolactone, Lisinopril  - EP Cards interrogation of AICD      - [employer] Defibrillator, Quadra Assura MP per patient's outpatient cardiologist  - Notify Dr. Hood of the admission (primary team)  - EP interrogation Chronic systolic (congestive) heart failure; Seems euvolemic; ProBNP wnl. Patient follows with cardiologist Dr. Kun Chambers.  - TTE 05/2024 showed normal left ventricular systolic function  with an ejection fraction of 56 %. Improved from EF of 20% in 2011.   - Patient w/ AICD and biventricular pacemaker    Plan  - Hold home Jardiance while inpt  - c/w BB, Spironolactone, Lisinopril  - AICD interrogated without events      - [employer] Fa[employer]o Defibrillator, Quadra Assura MP per patient's outpatient cardiologist

## 2024-11-14 NOTE — DISCHARGE NOTE PROVIDER - NSDCMRMEDTOKEN_GEN_ALL_CORE_FT
aspirin 81 mg oral delayed release tablet: 1 tab(s) orally once a day  calcium-vitamin d3: 600-10mg-mcg once daily  D3 25 mcg (1000 intl units) oral tablet: 1 tab(s) orally  ezetimibe 10 mg oral tablet: 1 tab(s) orally once a day (at bedtime)  famotidine 20 mg oral tablet: 1 tab(s) orally once a day (at bedtime)  Jardiance 10 mg oral tablet: 1 tab(s) orally  Levemir 100 units/mL subcutaneous solution: 16 unit(s) subcutaneous once a day (at bedtime)  losartan 50 mg oral tablet: 1 tab(s) orally once a day  metoprolol succinate 25 mg oral tablet, extended release: 1 tab(s) orally once a day  NovoLOG 100 units/mL injectable solution: 5 unit(s) injectable 3 times a day (before meals)  pantoprazole 40 mg oral delayed release tablet: 1 tab(s) orally 2 times a day before breakfast and dinner  polyethylene glycol 3350 oral powder for reconstitution: 17 gram(s) orally as needed for  congestion  rosuvastatin 20 mg oral capsule: 1 cap(s) orally once a day (at bedtime)  spironolactone 25 mg oral tablet: 1 tab(s) orally once a day   aspirin 81 mg oral delayed release tablet: 1 tab(s) orally once a day  calcium-vitamin d3: 600-10mg-mcg once daily  D3 25 mcg (1000 intl units) oral tablet: 1 tab(s) orally  ezetimibe 10 mg oral tablet: 1 tab(s) orally once a day (at bedtime)  famotidine 20 mg oral tablet: 1 tab(s) orally once a day (at bedtime)  Jardiance 10 mg oral tablet: 1 tab(s) orally  Levemir 100 units/mL subcutaneous solution: 16 unit(s) subcutaneous once a day (at bedtime)  losartan 50 mg oral tablet: 1 tab(s) orally once a day  metoprolol succinate 25 mg oral tablet, extended release: 1 tab(s) orally once a day  NovoLOG 100 units/mL injectable solution: 5 unit(s) injectable 3 times a day (before meals)  pantoprazole 40 mg oral delayed release tablet: 1 tab(s) orally 2 times a day before breakfast and dinner  polyethylene glycol 3350 oral powder for reconstitution: 17 gram(s) orally once a day as needed for  congestion  rosuvastatin 20 mg oral capsule: 1 cap(s) orally once a day (at bedtime)  Salonpas Flex Patch 4% topical film: Apply topically to affected area once a day as needed for  severe pain  spironolactone 25 mg oral tablet: 1 tab(s) orally once a day  Tylenol 325 mg oral tablet: 2 tab(s) orally every 6 hours as needed for  severe pain MDD: 4g

## 2024-11-14 NOTE — DISCHARGE NOTE PROVIDER - NSDCHC_MEDRECSTATUS_GEN_ALL_CORE
Admission Reconciliation is Completed  Discharge Reconciliation is Not Complete Admission Reconciliation is Completed  Discharge Reconciliation is Completed [Negative] : Heme/Lymph

## 2024-11-14 NOTE — DISCHARGE NOTE NURSING/CASE MANAGEMENT/SOCIAL WORK - PATIENT PORTAL LINK FT
You can access the FollowMyHealth Patient Portal offered by [facility] by registering at the following website: [email or web address]. By joining [facility]’s FollowMyHealth portal, you will also be able to view your health information using other applications (apps) compatible with our system.

## 2024-11-14 NOTE — PROGRESS NOTE ADULT - ASSESSMENT
79F sent in for CP and GOMES with several non-specific symptoms. Surgery consulted to r/o acute cholecystitis. CT w/ evidence of GB distended w/ fluid, enlarged compared to baseline, however without secondary signs of cholecystitis. U/S negative. Etiology of pain is more like MSK.    Plan:  - low suspicion for acute cholecystitis given lack of RUQ tenderness, normal WBC, and normal U/S findings  - if new suspicions develop, may obtain HIDA to r/o acute cholecystitis, but not warranted at this time  - surgery to sign off, reconsult as needed    B Team  [id number]

## 2024-11-14 NOTE — PROGRESS NOTE ADULT - SUBJECTIVE AND OBJECTIVE BOX
Curt Ya | MS3  Internal Medicine    OVERNIGHT EVENTS: no overnight events      SUBJECTIVE: 79y y/o patient was examined at bedside this morning. Appeared comfortable. Overnight vitals and monitoring results were reviewed. Reporting no complaints. Denied chest pain, SOB, abdominal pain, vomiting, diarrhea, constipation.       MEDICATIONS  (STANDING):  acetaminophen     Tablet .. 650 milliGRAM(s) Oral every 6 hours  aspirin enteric coated 81 milliGRAM(s) Oral daily  atorvastatin 40 milliGRAM(s) Oral at bedtime  dextrose 5%. 1000 milliLiter(s) (100 mL/Hr) IV Continuous <Continuous>  dextrose 5%. 1000 milliLiter(s) (50 mL/Hr) IV Continuous <Continuous>  dextrose 50% Injectable 12.5 Gram(s) IV Push once  dextrose 50% Injectable 25 Gram(s) IV Push once  dextrose 50% Injectable 25 Gram(s) IV Push once  ezetimibe 10 milliGRAM(s) Oral at bedtime  famotidine    Tablet 20 milliGRAM(s) Oral daily  glucagon  Injectable 1 milliGRAM(s) IntraMuscular once  heparin   Injectable 5000 Unit(s) SubCutaneous every 8 hours  influenza  Vaccine (HIGH DOSE) 0.5 milliLiter(s) IntraMuscular once  insulin glargine Injectable (LANTUS) 12 Unit(s) SubCutaneous at bedtime  insulin lispro (ADMELOG) corrective regimen sliding scale   SubCutaneous three times a day before meals  insulin lispro (ADMELOG) corrective regimen sliding scale   SubCutaneous at bedtime  insulin lispro Injectable (ADMELOG) 3 Unit(s) SubCutaneous three times a day before meals  lidocaine   4% Patch 1 Patch Transdermal daily  losartan 50 milliGRAM(s) Oral daily  metoprolol succinate ER 25 milliGRAM(s) Oral daily  spironolactone 25 milliGRAM(s) Oral daily    MEDICATIONS  (PRN):  dextrose Oral Gel 15 Gram(s) Oral once PRN Blood Glucose LESS THAN 70 milliGRAM(s)/deciliter  melatonin 3 milliGRAM(s) Oral at bedtime PRN Insomnia  ondansetron Injectable 4 milliGRAM(s) IV Push every 8 hours PRN Nausea and/or Vomiting  polyethylene glycol 3350 17 Gram(s) Oral daily PRN Constipation        T(F): 98.2 (11-14-24 @ 05:00), Max: 98.6 (11-13-24 @ 12:00)  HR: 72 (11-14-24 @ 05:00) (58 - 72)  BP: 110/86 (11-14-24 @ 05:00) (104/53 - 127/61)  BP(mean): --  RR: 18 (11-14-24 @ 05:00) (18 - 18)  SpO2: 100% (11-14-24 @ 05:00) (98% - 100%)    PHYSICAL EXAM:     GENERAL: NAD, lying in bed comfortably  HEAD:  Atraumatic, Normocephalic  EYES: EOMI, PERRLA, conjunctiva and sclera clear, no nystagmus noted  ENT: Moist mucous membranes,   NECK: trachea midline  CHEST/LUNG: Clear to auscultation bilaterally. Unlabored respirations  HEART: Regular rate and rhythm  ABDOMEN:  Soft, nontender, nondistended,  EXTREMITIES:  2+ Peripheral Pulses. No clubbing, cyanosis, or edema  MSK: No gross deformities noted   Neurological:  A&Ox3, no focal deficits   SKIN: No rashes or lesions  PSYCH: Normal mood, affect     TELEMETRY:    LABS:                        13.3   5.01  )-----------( 120      ( 13 Nov 2024 04:23 )             40.9     11-13    142  |  109[H]  |  27[H]  ----------------------------<  111[H]  4.5   |  24  |  1.29    Ca    9.7      13 Nov 2024 04:23  Phos  3.6     11-13  Mg     2.10     11-13    TPro  7.7  /  Alb  3.9  /  TBili  0.4  /  DBili  x   /  AST  41[H]  /  ALT  29  /  AlkPhos  68  11-12            Creatinine Trend: 1.29<--, 1.18<--  I&O's Summary    BNP      RADIOLOGY & ADDITIONAL STUDIES:             Curt Ya | MS3  Internal Medicine    OVERNIGHT EVENTS: no overnight events      SUBJECTIVE: 79y y/o patient was examined at bedside this morning. Appeared comfortable. Overnight vitals and monitoring results were reviewed. Reports chest and rib pain 8/10 off medications, reduced to 6/10 on meds. Pain is pleuritic, nonexertional and not associated with meals.       MEDICATIONS  (STANDING):  acetaminophen     Tablet .. 650 milliGRAM(s) Oral every 6 hours  aspirin enteric coated 81 milliGRAM(s) Oral daily  atorvastatin 40 milliGRAM(s) Oral at bedtime  dextrose 5%. 1000 milliLiter(s) (100 mL/Hr) IV Continuous <Continuous>  dextrose 5%. 1000 milliLiter(s) (50 mL/Hr) IV Continuous <Continuous>  dextrose 50% Injectable 12.5 Gram(s) IV Push once  dextrose 50% Injectable 25 Gram(s) IV Push once  dextrose 50% Injectable 25 Gram(s) IV Push once  ezetimibe 10 milliGRAM(s) Oral at bedtime  famotidine    Tablet 20 milliGRAM(s) Oral daily  glucagon  Injectable 1 milliGRAM(s) IntraMuscular once  heparin   Injectable 5000 Unit(s) SubCutaneous every 8 hours  influenza  Vaccine (HIGH DOSE) 0.5 milliLiter(s) IntraMuscular once  insulin glargine Injectable (LANTUS) 12 Unit(s) SubCutaneous at bedtime  insulin lispro (ADMELOG) corrective regimen sliding scale   SubCutaneous three times a day before meals  insulin lispro (ADMELOG) corrective regimen sliding scale   SubCutaneous at bedtime  insulin lispro Injectable (ADMELOG) 3 Unit(s) SubCutaneous three times a day before meals  lidocaine   4% Patch 1 Patch Transdermal daily  losartan 50 milliGRAM(s) Oral daily  metoprolol succinate ER 25 milliGRAM(s) Oral daily  spironolactone 25 milliGRAM(s) Oral daily    MEDICATIONS  (PRN):  dextrose Oral Gel 15 Gram(s) Oral once PRN Blood Glucose LESS THAN 70 milliGRAM(s)/deciliter  melatonin 3 milliGRAM(s) Oral at bedtime PRN Insomnia  ondansetron Injectable 4 milliGRAM(s) IV Push every 8 hours PRN Nausea and/or Vomiting  polyethylene glycol 3350 17 Gram(s) Oral daily PRN Constipation        T(F): 98.2 (11-14-24 @ 05:00), Max: 98.6 (11-13-24 @ 12:00)  HR: 72 (11-14-24 @ 05:00) (58 - 72)  BP: 110/86 (11-14-24 @ 05:00) (104/53 - 127/61)  BP(mean): --  RR: 18 (11-14-24 @ 05:00) (18 - 18)  SpO2: 100% (11-14-24 @ 05:00) (98% - 100%)    PHYSICAL EXAM:     GENERAL: NAD, lying in bed comfortably  HEAD:  Atraumatic, Normocephalic  EYES: EOMI, conjunctiva and sclera clear, no nystagmus noted  CHEST/LUNG: Clear to auscultation bilaterally. Unlabored respirations. No increased work of breathing. Pain to palpation in inferior sternum. Pain in right lower ribs.   HEART: Regular rate and rhythm  ABDOMEN: Soft, nontender, nondistended, Bowel sounds present  EXTREMITIES:  2+ Peripheral Pulses. No clubbing, cyanosis, or edema  MSK: No gross deformities noted   Neurological: A&Ox3, no focal deficits   SKIN: No rashes or lesions  PSYCH: Normal mood, affect       TELEMETRY:    LABS:                        13.3   5.01  )-----------( 120      ( 13 Nov 2024 04:23 )             40.9     11-13    142  |  109[H]  |  27[H]  ----------------------------<  111[H]  4.5   |  24  |  1.29    Ca    9.7      13 Nov 2024 04:23  Phos  3.6     11-13  Mg     2.10     11-13    TPro  7.7  /  Alb  3.9  /  TBili  0.4  /  DBili  x   /  AST  41[H]  /  ALT  29  /  AlkPhos  68  11-12            Creatinine Trend: 1.29<--, 1.18<--  I&O's Summary    BNP      RADIOLOGY & ADDITIONAL STUDIES:

## 2024-11-14 NOTE — PROGRESS NOTE ADULT - SUBJECTIVE AND OBJECTIVE BOX
SUBJECTIVE: Patient seen and examined on AM rounds. Endorsing pain on right side; however, pointing to right ribs. Tolerating diet. No nausea or vomiting. Passing gas. No BM since coming to Hospital but patient states she usually gets constipated when she comes to the hospital.     Vital Signs Last 24 Hrs  T(C): 36.8 (14 Nov 2024 05:00), Max: 37 (13 Nov 2024 12:00)  T(F): 98.2 (14 Nov 2024 05:00), Max: 98.6 (13 Nov 2024 12:00)  HR: 72 (14 Nov 2024 05:00) (58 - 72)  BP: 110/86 (14 Nov 2024 05:00) (104/53 - 127/61)  BP(mean): --  RR: 18 (14 Nov 2024 05:00) (18 - 18)  SpO2: 100% (14 Nov 2024 05:00) (98% - 100%)    Parameters below as of 14 Nov 2024 05:00  Patient On (Oxygen Delivery Method): room air        I&O's Detail      PHYSICAL EXAM:  General: Not acutely distressed  Respiratory: Nonlabored respirations  Cardiovascular: Pulse present  Chest: Right rib pain  Abdominal: Soft, nondistended, nontender. Espinosa's sign absent. No rebound or guarding. No organomegaly, no palpable mass  Extremities: Warm    LABS:                        13.3   5.01  )-----------( 120      ( 13 Nov 2024 04:23 )             40.9     11-13    142  |  109[H]  |  27[H]  ----------------------------<  111[H]  4.5   |  24  |  1.29    Ca    9.7      13 Nov 2024 04:23  Phos  3.6     11-13  Mg     2.10     11-13    TPro  7.7  /  Alb  3.9  /  TBili  0.4  /  DBili  x   /  AST  41[H]  /  ALT  29  /  AlkPhos  68  11-12      Urinalysis Basic - ( 13 Nov 2024 04:23 )    Color: x / Appearance: x / SG: x / pH: x  Gluc: 111 mg/dL / Ketone: x  / Bili: x / Urobili: x   Blood: x / Protein: x / Nitrite: x   Leuk Esterase: x / RBC: x / WBC x   Sq Epi: x / Non Sq Epi: x / Bacteria: x        RADIOLOGY & ADDITIONAL STUDIES:

## 2024-11-14 NOTE — PHYSICAL THERAPY INITIAL EVALUATION ADULT - NSPTDISCHREC_GEN_A_CORE
home with no skilled PT needs. Will follow while on unit/No skilled PT needs home with no skilled PT needs/No skilled PT needs

## 2024-11-14 NOTE — DISCHARGE NOTE PROVIDER - HOSPITAL COURSE
HPI:  78yo female with MHx of CAD s/p stents x 3 and AICD, CHF (EF=55%), hypertension, hyperlipidemia, diabetes, CKD, lower extremity arterial sclerosis presenting with 1 week of progressively worsening anterior chest pain, shortness of breath with occasionally  productive cough of white sputum, generalized weakness and chills, RUQ/RLQ abdominal pain, dysuria and intermittent palpitations.  Patient was sent by her cardiologist due to her shortness of breath/dyspnea on exertion. Patient states shortness of breath is intermittent, denies aggravating factors. States she notices even when siting or lying down not just when moving around. Also endorses orthopnea, states she has been sleeping with 2 pillows at night. Patient states her chest pain is worse when she touches the lower and middle portion of her sternum. She describes pain as localized, non-radiating, 8/10 in severity.  Denies any aggravating factors, patient has had some relief of pain with extra strength tylenol. Denies syncope/presyncope. Patient endorses tactile fever, malaise, rhinorrhea, sore throat, urinary frequency. Denies N/V/D, melena, hematochezia, hematuria, flank pain. tolerating small amounts of food and fluid. (12 Nov 2024 23:17)    Hospital Course:      Important Medication Changes and Reason:    Active or Pending Issues Requiring Follow-up:    Advanced Directives:   [ ] Full code  [ ] DNR  [ ] Hospice    Discharge Diagnoses:         HPI:  78yo female with MHx of CAD s/p stents x 3 and AICD, CHF (EF=55%), hypertension, hyperlipidemia, diabetes, CKD, lower extremity arterial sclerosis presenting with 1 week of progressively worsening anterior chest pain, shortness of breath with occasionally  productive cough of white sputum, generalized weakness and chills, RUQ/RLQ abdominal pain, dysuria and intermittent palpitations.  Patient was sent by her cardiologist due to her shortness of breath/dyspnea on exertion. Patient states shortness of breath is intermittent, denies aggravating factors. States she notices even when siting or lying down not just when moving around. Also endorses orthopnea, states she has been sleeping with 2 pillows at night. Patient states her chest pain is worse when she touches the lower and middle portion of her sternum. She describes pain as localized, non-radiating, 8/10 in severity.  Denies any aggravating factors, patient has had some relief of pain with extra strength tylenol. Denies syncope/presyncope. Patient endorses tactile fever, malaise, rhinorrhea, sore throat, urinary frequency. Denies N/V/D, melena, hematochezia, hematuria, flank pain. tolerating small amounts of food and fluid. (12 Nov 2024 23:17)    Hospital Course:  11/12 - Admitted to medicine for shortness of breath and chest pain.  11/13 - Pain appears to be musculoskeletal. Treating with Tylenol, Lidocaine Patches, and PT  11/14 - Pain unchanged. Treating with Tylenol, Lidocaine Patches, Warm packs, and PT    Important Medication Changes and Reason:    Active or Pending Issues Requiring Follow-up:    Advanced Directives:   [ x] Full code  [ ] DNR  [ ] Hospice    Discharge Diagnoses:  Costochondritis         HPI:  78yo female with MHx of CAD s/p stents x 3 and AICD, CHF (EF=55%), hypertension, hyperlipidemia, diabetes, CKD, lower extremity arterial sclerosis presenting with 1 week of progressively worsening anterior pleuritic chest pain, shortness of breath with occasionally  productive cough of white sputum, generalized weakness and chills, RUQ/RLQ abdominal pain, dysuria and intermittent palpitations.  Patient was sent by her cardiologist due to her shortness of breath/dyspnea on exertion. Patient states shortness of breath is intermittent, denies aggravating factors. States she notices even when siting or lying down not just when moving around. Also endorses orthopnea, states she has been sleeping with 2 pillows at night. Patient states her chest pain is worse when she touches the lower and middle portion of her sternum. She describes pain as localized, non-radiating, 8/10 in severity.  Denies any aggravating factors, patient has had some relief of pain with extra strength tylenol. Denies syncope/presyncope. Patient endorses tactile fever, malaise, rhinorrhea, sore throat, urinary frequency. Denies N/V/D, melena, hematochezia, hematuria, flank pain. tolerating small amounts of food and fluid. (12 Nov 2024 23:17)    Hospital Course:  11/12 - Admitted to medicine for shortness of breath and chest pain.  11/13 - Pain appears to be musculoskeletal. Treating with Tylenol, Lidocaine Patches, and PT  11/14 - Pain unchanged. Treating with Tylenol, Lidocaine Patches, Warm packs, and PT    Important Medication Changes and Reason:    Active or Pending Issues Requiring Follow-up:    Advanced Directives:   [ x] Full code  [ ] DNR  [ ] Hospice    Discharge Diagnoses:  Costochondritis         HPI:  80yo female with MHx of CAD s/p stents x 3 and AICD, CHF (EF=55%), hypertension, hyperlipidemia, diabetes, CKD, lower extremity arterial sclerosis presenting with 1 week of progressively worsening anterior pleuritic chest pain, shortness of breath with occasionally  productive cough of white sputum, generalized weakness and chills, RUQ/RLQ abdominal pain, dysuria and intermittent palpitations.  Patient was sent by her cardiologist due to her shortness of breath/dyspnea on exertion. Patient states shortness of breath is intermittent, denies aggravating factors. States she notices even when siting or lying down not just when moving around. Also endorses orthopnea, states she has been sleeping with 2 pillows at night. Patient states her chest pain is worse when she touches the lower and middle portion of her sternum. She describes pain as localized, non-radiating, 8/10 in severity.  Denies any aggravating factors, patient has had some relief of pain with extra strength tylenol. Denies syncope/presyncope. Patient endorses tactile fever, malaise, rhinorrhea, sore throat, urinary frequency. Denies N/V/D, melena, hematochezia, hematuria, flank pain. tolerating small amounts of food and fluid. (12 Nov 2024 23:17)    Hospital Course:  Patient admitted to medicine floor for further management. Initial laboratory w/u with slightly elevated D-dimer however with Well's score of 0. Ultimately her chest pain was attributed to costochondritis based on history and she was treated with Lidocaine patch and Tylenol (NSAIDs deferred due to CKD). Patient remains hemodynamically stable throughout hospitalization and deemed medically optimized for discharge    Important Medication Changes and Reason:  - No medication changes were made to your home medication, please continue all medications as before  - Tylenol 650mg every 6 hours as needed for pain    Active or Pending Issues Requiring Follow-up:  - Follow up with Dr. Chambers within 1-2 weeks of discharge  - Follow up with PCP within 1-2 weeks of discharge    Advanced Directives:   [ x] Full code  [ ] DNR  [ ] Hospice    Discharge Diagnoses:  Costochondritis         HPI:  80yo female with MHx of CAD s/p stents x 3 and AICD, CHF (EF=55%), hypertension, hyperlipidemia, diabetes, CKD, lower extremity arterial sclerosis presenting with 1 week of progressively worsening anterior pleuritic chest pain, shortness of breath with occasionally  productive cough of white sputum, generalized weakness and chills, RUQ/RLQ abdominal pain, dysuria and intermittent palpitations.  Patient was sent by her cardiologist due to her shortness of breath/dyspnea on exertion. Patient states shortness of breath is intermittent, denies aggravating factors. States she notices even when siting or lying down not just when moving around. Also endorses orthopnea, states she has been sleeping with 2 pillows at night. Patient states her chest pain is worse when she touches the lower and middle portion of her sternum. She describes pain as localized, non-radiating, 8/10 in severity.  Denies any aggravating factors, patient has had some relief of pain with extra strength tylenol. Denies syncope/presyncope. Patient endorses tactile fever, malaise, rhinorrhea, sore throat, urinary frequency. Denies N/V/D, melena, hematochezia, hematuria, flank pain. tolerating small amounts of food and fluid. (12 Nov 2024 23:17)    Hospital Course:  Patient admitted to medicine floor for further management. Initial laboratory w/u with slightly elevated D-dimer however with Well's score of 0. EKG was unchanged and remained euvolemic throughout admission. Ultimately her chest pain was attributed to costochondritis based on history and she was treated with Lidocaine patch and Tylenol (NSAIDs deferred due to CKD). Patient remains hemodynamically stable throughout hospitalization and deemed medically optimized for discharge    Important Medication Changes and Reason:  - No medication changes were made to your home medication, please continue all medications as before  - Tylenol 650mg every 6 hours as needed for pain    Active or Pending Issues Requiring Follow-up:  - Follow up with Dr. Chambers within 1-2 weeks of discharge  - Follow up with PCP within 1-2 weeks of discharge    Advanced Directives:   [ x] Full code  [ ] DNR  [ ] Hospice    Discharge Diagnoses:  Costochondritis

## 2024-11-14 NOTE — PHYSICAL THERAPY INITIAL EVALUATION ADULT - PERTINENT HX OF CURRENT PROBLEM, REHAB EVAL
Patient is an 79 year old female presenting with shortness of breath and right upper quadrant pain consistent with costochondritis.  Patient with shortness of breath, right lower rib pain, and inferior sternal pain. Sternal pain worsened by palpation and deep inspiration, nonexertional, and not related to meals. EKG unchanged. CT w/ evidence of gal bladder distended w/ fluid, enlarged compared to baseline, however without secondary signs of cholecystitis. Ultrasound negative. Etiology of pain is more likely musculoskeletal

## 2024-11-14 NOTE — DISCHARGE NOTE NURSING/CASE MANAGEMENT/SOCIAL WORK - NSDCVIVACCINE_GEN_ALL_CORE_FT
Tdap; 17-Aug-2017 17:03; Vitaly Gibson (RN); [employer]; [id number]; IntraMuscular; Deltoid Left.; 0.5 milliLiter(s); VIS (VIS Published: 09-May-2013, VIS Presented: 17-Aug-2017);

## 2024-11-14 NOTE — DISCHARGE NOTE PROVIDER - NSDCFUADDAPPT_GEN_ALL_CORE_FT
APPTS ARE READY TO BE MADE: [X] YES    Best Family or Patient Contact (if needed):    Additional Information about above appointments (if needed):    1: PCP Dr. Krishnan within 1-2 weeks of discharge  2: Cardiologist Dr. Chambers within 1-2 weeks of discharge  3:     Other comments or requests:    APPTS ARE READY TO BE MADE: [X] YES    Best Family or Patient Contact (if needed):    Additional Information about above appointments (if needed):    1: PCP Dr. Krishnan within 1-2 weeks of discharge  2: Cardiologist Dr. Chambers within 1-2 weeks of discharge  3:     Other comments or requests:           11/22-Appointment was scheduled by our team on the patient's behalf through the provider's office with Kun Merino 2/5/24 3:40 [facility]m. [facility]  [address] [place] [address] - [place] [address]    PCP-Office is closed at this time: Setting as a callback and continue, so I have time to coordinate the patients appointments. I will follow up with the patient once the appointments have been secured. APPTS ARE READY TO BE MADE: [X] YES    Best Family or Patient Contact (if needed):    Additional Information about above appointments (if needed):    1: PCP Dr. Krishnan within 1-2 weeks of discharge  2: Cardiologist Dr. Chambers within 1-2 weeks of discharge  3:     Other comments or requests:       11/22-Appointment was scheduled by our team on the patient's behalf through the provider's office with Kun Merino  2/5/24 3:40 p.m. [facility]  [address] [place], [place] [address] [address]    11/22- Vascular : patient also have a Vascular appointment on 12/13/24  11 a.mGregory Ibrahim# [id number] [facility]  [address] Dr. Nelsy Cook   PCP-Office is closed at this time: Setting as a callback and continue, so I have time to coordinate the patients appointments. I will follow up with the patient once the appointments have been secured.    11/22-Appointment was scheduled by our team on the patient's behalf through the provider's office with Kun Merino 2/5/24 3:40 p.m. [facility]  [address] [place], [place] [address]. [place] [address]    PCP-Office is closed at this time: Setting as a callback and continue, so I have time to coordinate the patients appointments. I will follow up with the patient once the appointments have been secured. APPTS ARE READY TO BE MADE: [X] YES    Best Family or Patient Contact (if needed):    Additional Information about above appointments (if needed):    1: PCP Dr. Krishnan within 1-2 weeks of discharge  2: Cardiologist Dr. Chambers within 1-2 weeks of discharge  3:     Other comments or requests:       Appointment was scheduled by our team on the patient's behalf through the provider's office with Kun Merino  2/5/24 3:40 [facility].m. [facility]  [address] [place] [address] [place] [address]    Vascular : patient also have a Vascular appointment on 12/13/24  11 a.mGregory Ibrahim# [id number] [facility]  [id number] Dr. Nelsy Cook     Patient is scheduled to see Dr. Krishnan on 11/25 at [address] [place], [place] [id number]

## 2024-11-14 NOTE — PROGRESS NOTE ADULT - PROBLEM SELECTOR PLAN 1
Patient with SOB, R lower rib pain, and inferior sternal pain. Sternal pain worsened by palpation and deep inspiration, nonexertional, and not related to meals. EKG unchanged. Troponin 23 (11/12). VBG was benign (11/13). Viral panel negative.  Euvolemic on exam and no bibasilar crackles. CXR negative. D-dimer elevated at 597, but O2 SAT normal on RA and Wells' Score=0. Rib pain vs RUQ pain c/f biliary disease, but no fever, leukocytosis and RUQ ultrasound benign. Sonographic Espinosa's sign is negative.     Plan  - Tylenol and lidocaine patch for pain  - PT Patient with SOB, R lower rib pain, and inferior sternal pain. Sternal pain worsened by palpation and deep inspiration, nonexertional, and not related to meals. EKG unchanged. Troponin 23 (11/12). VBG was benign (11/13). Viral panel negative.  Euvolemic on exam and no bibasilar crackles. CXR negative. D-dimer elevated at 597, but O2 SAT normal on RA and Wells' Score=0. Rib pain vs RUQ pain c/f biliary disease, but no fever, leukocytosis and RUQ ultrasound benign. Sonographic Espinosa's sign is negative.     Plan  - Tylenol and lidocaine patch for pain  - Warm compresses PRN  - PT

## 2024-11-14 NOTE — PHYSICAL THERAPY INITIAL EVALUATION ADULT - MANUAL MUSCLE TESTING RESULTS, REHAB EVAL
Patients bilateral upper extremity strength grossly 4/5, bilateral lower extremity strength grossly 4-/5 upon MMT and functional assessment

## 2024-11-14 NOTE — PHYSICAL THERAPY INITIAL EVALUATION ADULT - GENERAL OBSERVATIONS, REHAB EVAL
Patient found semi-reclined in bed NAD, A&Ox4, +tele monitor, HR 71, patient OK for PT per RN Ermelinda, patient agreeable to PT evaluation

## 2024-11-14 NOTE — DISCHARGE NOTE PROVIDER - NSDCCPCAREPLAN_GEN_ALL_CORE_FT
PRINCIPAL DISCHARGE DIAGNOSIS  Diagnosis: Costochondritis  Assessment and Plan of Treatment:      PRINCIPAL DISCHARGE DIAGNOSIS  Diagnosis: Costochondritis  Assessment and Plan of Treatment: You were seen and treated for costochondritis, which is inflammation of the ligaments on your chest wall. Symptoms of costochondritis are nonradiating pain that can be triggered but pushing on it. You received Tylenol and Lidocaine patch while you are here. Upon discharge, please follow up with your doctors closely and take all your medications as prescribed. If your experience worsening chest pain, shortness of breath, dizziness, please let your doctors know and seek medical attenetion.

## 2024-11-14 NOTE — DISCHARGE NOTE NURSING/CASE MANAGEMENT/SOCIAL WORK - NSDCPEFALRISK_GEN_ALL_CORE
For information on Fall & Injury Prevention, visit: [email or web address] OR  [email or web address] OR  https://www.cdc.gov/steadi/patient.html

## 2024-11-14 NOTE — PROGRESS NOTE ADULT - PROBLEM SELECTOR PLAN 8
DVT PPx: SCDs   E: replete K<4, Mg<2  Diet: DASH, soft diet  Code Status: Full  Dispo: pending medical improvement DVT PPx: SCDs   E: replete K<4, Mg<2  Diet: DASH, soft diet  Code Status: Full  Dispo: expected d/c 11/14

## 2024-11-25 ENCOUNTER — APPOINTMENT (OUTPATIENT)
Dept: INTERNAL MEDICINE | Facility: CLINIC | Age: 79
End: 2024-11-25

## 2024-11-25 VITALS
WEIGHT: 149 LBS | DIASTOLIC BLOOD PRESSURE: 68 MMHG | BODY MASS INDEX: 27.42 KG/M2 | SYSTOLIC BLOOD PRESSURE: 106 MMHG | TEMPERATURE: 97.9 F | HEART RATE: 92 BPM | OXYGEN SATURATION: 98 % | RESPIRATION RATE: 16 BRPM | HEIGHT: 62 IN

## 2024-11-25 DIAGNOSIS — E11.69 TYPE 2 DIABETES MELLITUS WITH OTHER SPECIFIED COMPLICATION: ICD-10-CM

## 2024-11-25 DIAGNOSIS — K21.9 GASTRO-ESOPHAGEAL REFLUX DISEASE W/OUT ESOPHAGITIS: ICD-10-CM

## 2024-11-25 DIAGNOSIS — I50.22 CHRONIC SYSTOLIC (CONGESTIVE) HEART FAILURE: ICD-10-CM

## 2024-11-25 DIAGNOSIS — Z95.810 PRESENCE OF AUTOMATIC (IMPLANTABLE) CARDIAC DEFIBRILLATOR: ICD-10-CM

## 2024-11-25 DIAGNOSIS — K31.84 GASTROPARESIS: ICD-10-CM

## 2024-11-25 DIAGNOSIS — M54.12 RADICULOPATHY, CERVICAL REGION: ICD-10-CM

## 2024-11-25 DIAGNOSIS — I63.81 OTHER CEREBRAL INFARCTION DUE TO OCCLUSION OR STENOSIS OF SMALL ARTERY: ICD-10-CM

## 2024-11-25 DIAGNOSIS — R60.9 EDEMA, UNSPECIFIED: ICD-10-CM

## 2024-11-25 DIAGNOSIS — I73.9 PERIPHERAL VASCULAR DISEASE, UNSPECIFIED: ICD-10-CM

## 2024-11-25 DIAGNOSIS — I10 ESSENTIAL (PRIMARY) HYPERTENSION: ICD-10-CM

## 2024-11-25 DIAGNOSIS — A53.0 LATENT SYPHILIS, UNSPECIFIED AS EARLY OR LATE: ICD-10-CM

## 2024-11-25 DIAGNOSIS — R79.89 OTHER SPECIFIED ABNORMAL FINDINGS OF BLOOD CHEMISTRY: ICD-10-CM

## 2024-11-25 DIAGNOSIS — E78.5 HYPERLIPIDEMIA, UNSPECIFIED: ICD-10-CM

## 2024-11-25 DIAGNOSIS — R26.89 OTHER ABNORMALITIES OF GAIT AND MOBILITY: ICD-10-CM

## 2024-11-25 DIAGNOSIS — M16.10 UNILATERAL PRIMARY OSTEOARTHRITIS, UNSPECIFIED HIP: ICD-10-CM

## 2024-11-25 DIAGNOSIS — E11.40 TYPE 2 DIABETES MELLITUS WITH DIABETIC NEUROPATHY, UNSPECIFIED: ICD-10-CM

## 2024-11-25 DIAGNOSIS — E11.9 TYPE 2 DIABETES MELLITUS W/OUT COMPLICATIONS: ICD-10-CM

## 2024-11-25 DIAGNOSIS — R41.3 OTHER AMNESIA: ICD-10-CM

## 2024-11-25 DIAGNOSIS — Z23 ENCOUNTER FOR IMMUNIZATION: ICD-10-CM

## 2024-11-25 DIAGNOSIS — M48.061 SPINAL STENOSIS, LUMBAR REGION WITHOUT NEUROGENIC CLAUDICATION: ICD-10-CM

## 2024-11-25 DIAGNOSIS — N18.2 CHRONIC KIDNEY DISEASE, STAGE 2 (MILD): ICD-10-CM

## 2024-11-25 DIAGNOSIS — R07.9 CHEST PAIN, UNSPECIFIED: ICD-10-CM

## 2024-11-25 DIAGNOSIS — K31.89 TYPE 2 DIABETES MELLITUS WITH OTHER SPECIFIED COMPLICATION: ICD-10-CM

## 2024-11-25 PROCEDURE — 99215 OFFICE O/P EST HI 40 MIN: CPT | Mod: 25

## 2024-11-25 PROCEDURE — 90662 IIV NO PRSV INCREASED AG IM: CPT

## 2024-11-25 PROCEDURE — G0008: CPT

## 2024-12-06 ENCOUNTER — RX RENEWAL (OUTPATIENT)
Age: 79
End: 2024-12-06

## 2024-12-13 ENCOUNTER — APPOINTMENT (OUTPATIENT)
Dept: VASCULAR SURGERY | Facility: CLINIC | Age: 79
End: 2024-12-13
Payer: MEDICARE

## 2024-12-13 VITALS
WEIGHT: 149 LBS | HEIGHT: 62 IN | HEART RATE: 88 BPM | BODY MASS INDEX: 27.42 KG/M2 | OXYGEN SATURATION: 97 % | SYSTOLIC BLOOD PRESSURE: 124 MMHG | TEMPERATURE: 97.2 F | DIASTOLIC BLOOD PRESSURE: 75 MMHG

## 2024-12-13 DIAGNOSIS — M79.605 PAIN IN LEFT LEG: ICD-10-CM

## 2024-12-13 DIAGNOSIS — M79.604 PAIN IN RIGHT LEG: ICD-10-CM

## 2024-12-13 DIAGNOSIS — Z95.810 PRESENCE OF AUTOMATIC (IMPLANTABLE) CARDIAC DEFIBRILLATOR: ICD-10-CM

## 2024-12-13 DIAGNOSIS — M16.10 UNILATERAL PRIMARY OSTEOARTHRITIS, UNSPECIFIED HIP: ICD-10-CM

## 2024-12-13 DIAGNOSIS — E11.9 TYPE 2 DIABETES MELLITUS W/OUT COMPLICATIONS: ICD-10-CM

## 2024-12-13 DIAGNOSIS — I70.209 UNSPECIFIED ATHEROSCLEROSIS OF NATIVE ARTERIES OF EXTREMITIES, UNSPECIFIED EXTREMITY: ICD-10-CM

## 2024-12-13 DIAGNOSIS — E11.42 TYPE 2 DIABETES MELLITUS WITH DIABETIC POLYNEUROPATHY: ICD-10-CM

## 2024-12-13 DIAGNOSIS — R60.9 EDEMA, UNSPECIFIED: ICD-10-CM

## 2024-12-13 PROCEDURE — 99214 OFFICE O/P EST MOD 30 MIN: CPT

## 2024-12-13 RX ORDER — CILOSTAZOL 100 MG/1
100 TABLET ORAL TWICE DAILY
Qty: 180 | Refills: 2 | Status: ACTIVE | COMMUNITY
Start: 2024-12-13 | End: 1900-01-01

## 2024-12-23 ENCOUNTER — RX RENEWAL (OUTPATIENT)
Age: 79
End: 2024-12-23

## 2025-01-06 ENCOUNTER — NON-APPOINTMENT (OUTPATIENT)
Age: 80
End: 2025-01-06

## 2025-01-06 ENCOUNTER — RX RENEWAL (OUTPATIENT)
Age: 80
End: 2025-01-06

## 2025-01-15 ENCOUNTER — RX RENEWAL (OUTPATIENT)
Age: 80
End: 2025-01-15

## 2025-01-24 ENCOUNTER — APPOINTMENT (OUTPATIENT)
Dept: ELECTROPHYSIOLOGY | Facility: CLINIC | Age: 80
End: 2025-01-24
Payer: MEDICARE

## 2025-01-24 ENCOUNTER — NON-APPOINTMENT (OUTPATIENT)
Age: 80
End: 2025-01-24

## 2025-01-24 PROCEDURE — 93296 REM INTERROG EVL PM/IDS: CPT

## 2025-01-24 PROCEDURE — 93295 DEV INTERROG REMOTE 1/2/MLT: CPT

## 2025-01-28 ENCOUNTER — RX RENEWAL (OUTPATIENT)
Age: 80
End: 2025-01-28

## 2025-02-05 ENCOUNTER — APPOINTMENT (OUTPATIENT)
Dept: HEART FAILURE | Facility: CLINIC | Age: 80
End: 2025-02-05

## 2025-02-12 ENCOUNTER — APPOINTMENT (OUTPATIENT)
Dept: RADIOLOGY | Facility: HOSPITAL | Age: 80
End: 2025-02-12

## 2025-02-14 ENCOUNTER — APPOINTMENT (OUTPATIENT)
Dept: INTERNAL MEDICINE | Facility: CLINIC | Age: 80
End: 2025-02-14

## 2025-03-13 ENCOUNTER — APPOINTMENT (OUTPATIENT)
Dept: HEART FAILURE | Facility: CLINIC | Age: 80
End: 2025-03-13
Payer: MEDICARE

## 2025-03-13 ENCOUNTER — NON-APPOINTMENT (OUTPATIENT)
Age: 80
End: 2025-03-13

## 2025-03-13 VITALS
TEMPERATURE: 98.3 F | HEART RATE: 82 BPM | WEIGHT: 144 LBS | BODY MASS INDEX: 26.5 KG/M2 | OXYGEN SATURATION: 96 % | DIASTOLIC BLOOD PRESSURE: 74 MMHG | HEIGHT: 62 IN | SYSTOLIC BLOOD PRESSURE: 125 MMHG

## 2025-03-13 DIAGNOSIS — I50.32 CHRONIC DIASTOLIC (CONGESTIVE) HEART FAILURE: ICD-10-CM

## 2025-03-13 PROCEDURE — G2211 COMPLEX E/M VISIT ADD ON: CPT

## 2025-03-13 PROCEDURE — 93000 ELECTROCARDIOGRAM COMPLETE: CPT

## 2025-03-13 PROCEDURE — 99214 OFFICE O/P EST MOD 30 MIN: CPT

## 2025-03-13 PROCEDURE — 36415 COLL VENOUS BLD VENIPUNCTURE: CPT

## 2025-03-13 RX ORDER — FUROSEMIDE 20 MG/1
20 TABLET ORAL
Qty: 15 | Refills: 2 | Status: ACTIVE | COMMUNITY
Start: 2025-03-13 | End: 1900-01-01

## 2025-03-14 ENCOUNTER — APPOINTMENT (OUTPATIENT)
Dept: VASCULAR SURGERY | Facility: CLINIC | Age: 80
End: 2025-03-14
Payer: MEDICARE

## 2025-03-14 VITALS
TEMPERATURE: 98.2 F | HEIGHT: 62 IN | BODY MASS INDEX: 26.5 KG/M2 | DIASTOLIC BLOOD PRESSURE: 87 MMHG | OXYGEN SATURATION: 95 % | WEIGHT: 144 LBS | HEART RATE: 70 BPM | SYSTOLIC BLOOD PRESSURE: 145 MMHG

## 2025-03-14 DIAGNOSIS — M79.604 PAIN IN RIGHT LEG: ICD-10-CM

## 2025-03-14 DIAGNOSIS — I70.209 UNSPECIFIED ATHEROSCLEROSIS OF NATIVE ARTERIES OF EXTREMITIES, UNSPECIFIED EXTREMITY: ICD-10-CM

## 2025-03-14 DIAGNOSIS — I50.22 CHRONIC SYSTOLIC (CONGESTIVE) HEART FAILURE: ICD-10-CM

## 2025-03-14 DIAGNOSIS — I73.9 PERIPHERAL VASCULAR DISEASE, UNSPECIFIED: ICD-10-CM

## 2025-03-14 DIAGNOSIS — E11.9 TYPE 2 DIABETES MELLITUS W/OUT COMPLICATIONS: ICD-10-CM

## 2025-03-14 DIAGNOSIS — M79.605 PAIN IN LEFT LEG: ICD-10-CM

## 2025-03-14 DIAGNOSIS — M48.061 SPINAL STENOSIS, LUMBAR REGION WITHOUT NEUROGENIC CLAUDICATION: ICD-10-CM

## 2025-03-14 DIAGNOSIS — R60.9 EDEMA, UNSPECIFIED: ICD-10-CM

## 2025-03-14 DIAGNOSIS — Z95.810 PRESENCE OF AUTOMATIC (IMPLANTABLE) CARDIAC DEFIBRILLATOR: ICD-10-CM

## 2025-03-14 PROCEDURE — 99213 OFFICE O/P EST LOW 20 MIN: CPT

## 2025-03-19 LAB
ANION GAP SERPL CALC-SCNC: 15 MMOL/L
BUN SERPL-MCNC: 36 MG/DL
CALCIUM SERPL-MCNC: 10.1 MG/DL
CHLORIDE SERPL-SCNC: 103 MMOL/L
CO2 SERPL-SCNC: 24 MMOL/L
CREAT SERPL-MCNC: 1.25 MG/DL
EGFRCR SERPLBLD CKD-EPI 2021: 43 ML/MIN/1.73M2
GLUCOSE SERPL-MCNC: 93 MG/DL
NT-PROBNP SERPL-MCNC: 93 PG/ML
POTASSIUM SERPL-SCNC: 4.4 MMOL/L
SODIUM SERPL-SCNC: 142 MMOL/L

## 2025-04-08 ENCOUNTER — APPOINTMENT (OUTPATIENT)
Dept: ENDOCRINOLOGY | Facility: CLINIC | Age: 80
End: 2025-04-08
Payer: MEDICARE

## 2025-04-08 VITALS
WEIGHT: 144 LBS | OXYGEN SATURATION: 96 % | HEIGHT: 62 IN | SYSTOLIC BLOOD PRESSURE: 130 MMHG | HEART RATE: 99 BPM | DIASTOLIC BLOOD PRESSURE: 70 MMHG | BODY MASS INDEX: 26.5 KG/M2

## 2025-04-08 DIAGNOSIS — I50.32 CHRONIC DIASTOLIC (CONGESTIVE) HEART FAILURE: ICD-10-CM

## 2025-04-08 DIAGNOSIS — E11.9 TYPE 2 DIABETES MELLITUS W/OUT COMPLICATIONS: ICD-10-CM

## 2025-04-08 DIAGNOSIS — E11.69 TYPE 2 DIABETES MELLITUS WITH OTHER SPECIFIED COMPLICATION: ICD-10-CM

## 2025-04-08 DIAGNOSIS — R79.89 OTHER SPECIFIED ABNORMAL FINDINGS OF BLOOD CHEMISTRY: ICD-10-CM

## 2025-04-08 DIAGNOSIS — E83.52 HYPERCALCEMIA: ICD-10-CM

## 2025-04-08 DIAGNOSIS — E11.42 TYPE 2 DIABETES MELLITUS WITH DIABETIC POLYNEUROPATHY: ICD-10-CM

## 2025-04-08 DIAGNOSIS — I10 ESSENTIAL (PRIMARY) HYPERTENSION: ICD-10-CM

## 2025-04-08 DIAGNOSIS — N18.2 CHRONIC KIDNEY DISEASE, STAGE 2 (MILD): ICD-10-CM

## 2025-04-08 DIAGNOSIS — E78.5 HYPERLIPIDEMIA, UNSPECIFIED: ICD-10-CM

## 2025-04-08 DIAGNOSIS — K31.89 TYPE 2 DIABETES MELLITUS WITH OTHER SPECIFIED COMPLICATION: ICD-10-CM

## 2025-04-08 PROCEDURE — 99214 OFFICE O/P EST MOD 30 MIN: CPT

## 2025-04-08 PROCEDURE — G2211 COMPLEX E/M VISIT ADD ON: CPT

## 2025-04-08 PROCEDURE — 83036 HEMOGLOBIN GLYCOSYLATED A1C: CPT | Mod: QW

## 2025-04-08 PROCEDURE — 82962 GLUCOSE BLOOD TEST: CPT

## 2025-04-09 LAB
GLUCOSE BLDC GLUCOMTR-MCNC: 185
HBA1C MFR BLD HPLC: 6.3

## 2025-04-29 ENCOUNTER — APPOINTMENT (OUTPATIENT)
Dept: ELECTROPHYSIOLOGY | Facility: CLINIC | Age: 80
End: 2025-04-29
Payer: MEDICARE

## 2025-04-29 ENCOUNTER — NON-APPOINTMENT (OUTPATIENT)
Age: 80
End: 2025-04-29

## 2025-04-29 PROCEDURE — 93296 REM INTERROG EVL PM/IDS: CPT

## 2025-04-29 PROCEDURE — 93295 DEV INTERROG REMOTE 1/2/MLT: CPT

## 2025-05-21 ENCOUNTER — RX RENEWAL (OUTPATIENT)
Age: 80
End: 2025-05-21

## 2025-05-30 ENCOUNTER — NON-APPOINTMENT (OUTPATIENT)
Age: 80
End: 2025-05-30

## 2025-06-13 ENCOUNTER — APPOINTMENT (OUTPATIENT)
Dept: HEART FAILURE | Facility: CLINIC | Age: 80
End: 2025-06-13

## 2025-06-13 VITALS
DIASTOLIC BLOOD PRESSURE: 71 MMHG | HEIGHT: 62 IN | OXYGEN SATURATION: 98 % | TEMPERATURE: 97.9 F | HEART RATE: 78 BPM | BODY MASS INDEX: 26.87 KG/M2 | SYSTOLIC BLOOD PRESSURE: 106 MMHG | WEIGHT: 146 LBS

## 2025-06-13 PROCEDURE — G2211 COMPLEX E/M VISIT ADD ON: CPT

## 2025-06-13 PROCEDURE — 99214 OFFICE O/P EST MOD 30 MIN: CPT

## 2025-06-13 PROCEDURE — 93000 ELECTROCARDIOGRAM COMPLETE: CPT

## 2025-06-18 ENCOUNTER — APPOINTMENT (OUTPATIENT)
Dept: INTERNAL MEDICINE | Facility: CLINIC | Age: 80
End: 2025-06-18
Payer: MEDICARE

## 2025-06-18 VITALS
HEART RATE: 86 BPM | OXYGEN SATURATION: 98 % | BODY MASS INDEX: 26.68 KG/M2 | HEIGHT: 62 IN | DIASTOLIC BLOOD PRESSURE: 80 MMHG | WEIGHT: 145 LBS | SYSTOLIC BLOOD PRESSURE: 112 MMHG

## 2025-06-18 PROBLEM — Z23 NEED FOR PNEUMOCOCCAL VACCINE: Status: ACTIVE | Noted: 2025-06-18

## 2025-06-18 PROCEDURE — G0439: CPT

## 2025-06-18 PROCEDURE — 90677 PCV20 VACCINE IM: CPT

## 2025-06-18 PROCEDURE — G0009: CPT

## 2025-06-18 PROCEDURE — 36415 COLL VENOUS BLD VENIPUNCTURE: CPT

## 2025-06-19 LAB
25(OH)D3 SERPL-MCNC: 39.2 NG/ML
ALBUMIN SERPL ELPH-MCNC: 4.1 G/DL
ALP BLD-CCNC: 76 U/L
ALT SERPL-CCNC: 39 U/L
ANION GAP SERPL CALC-SCNC: 14 MMOL/L
APPEARANCE: CLEAR
AST SERPL-CCNC: 40 U/L
BACTERIA: NEGATIVE /HPF
BASOPHILS # BLD AUTO: 0.04 K/UL
BASOPHILS NFR BLD AUTO: 0.8 %
BILIRUB SERPL-MCNC: 0.4 MG/DL
BILIRUBIN URINE: NEGATIVE
BLOOD URINE: NEGATIVE
BUN SERPL-MCNC: 31 MG/DL
CALCIUM SERPL-MCNC: 9.7 MG/DL
CALCIUM SERPL-MCNC: 9.7 MG/DL
CAST: 0 /LPF
CHLORIDE SERPL-SCNC: 106 MMOL/L
CHOLEST SERPL-MCNC: 154 MG/DL
CO2 SERPL-SCNC: 22 MMOL/L
COLOR: YELLOW
CREAT SERPL-MCNC: 1.23 MG/DL
CREAT SPEC-SCNC: 45 MG/DL
EGFRCR SERPLBLD CKD-EPI 2021: 44 ML/MIN/1.73M2
EOSINOPHIL # BLD AUTO: 0.03 K/UL
EOSINOPHIL NFR BLD AUTO: 0.6 %
EPITHELIAL CELLS: 3 /HPF
ESTIMATED AVERAGE GLUCOSE: 143 MG/DL
FOLATE SERPL-MCNC: 14.1 NG/ML
GLUCOSE QUALITATIVE U: >=1000 MG/DL
GLUCOSE SERPL-MCNC: 136 MG/DL
HBA1C MFR BLD HPLC: 6.6 %
HCT VFR BLD CALC: 44.8 %
HDLC SERPL-MCNC: 53 MG/DL
HGB BLD-MCNC: 14.1 G/DL
IMM GRANULOCYTES NFR BLD AUTO: 0.2 %
KETONES URINE: NEGATIVE MG/DL
LDLC SERPL-MCNC: 89 MG/DL
LEUKOCYTE ESTERASE URINE: NEGATIVE
LYMPHOCYTES # BLD AUTO: 1.14 K/UL
LYMPHOCYTES NFR BLD AUTO: 22.9 %
MAGNESIUM SERPL-MCNC: 2.1 MG/DL
MAN DIFF?: NORMAL
MCHC RBC-ENTMCNC: 31.4 PG
MCHC RBC-ENTMCNC: 31.5 G/DL
MCV RBC AUTO: 99.8 FL
MICROALBUMIN 24H UR DL<=1MG/L-MCNC: <1.2 MG/DL
MICROALBUMIN/CREAT 24H UR-RTO: NORMAL MG/G
MICROSCOPIC-UA: NORMAL
MONOCYTES # BLD AUTO: 0.66 K/UL
MONOCYTES NFR BLD AUTO: 13.3 %
NEUTROPHILS # BLD AUTO: 3.1 K/UL
NEUTROPHILS NFR BLD AUTO: 62.2 %
NITRITE URINE: NEGATIVE
NONHDLC SERPL-MCNC: 102 MG/DL
NT-PROBNP SERPL-MCNC: 174 PG/ML
PARATHYROID HORMONE INTACT: 59 PG/ML
PH URINE: 6
PHOSPHATE SERPL-MCNC: 3.2 MG/DL
PLATELET # BLD AUTO: 151 K/UL
POTASSIUM SERPL-SCNC: 4.3 MMOL/L
PROT SERPL-MCNC: 7.4 G/DL
PROTEIN URINE: NEGATIVE MG/DL
RBC # BLD: 4.49 M/UL
RBC # FLD: 15.3 %
RED BLOOD CELLS URINE: 1 /HPF
SODIUM SERPL-SCNC: 141 MMOL/L
SPECIFIC GRAVITY URINE: 1.03
TRIGL SERPL-MCNC: 60 MG/DL
TSH SERPL-ACNC: 1.55 UIU/ML
UROBILINOGEN URINE: 0.2 MG/DL
VIT B12 SERPL-MCNC: 1281 PG/ML
WBC # FLD AUTO: 4.98 K/UL
WHITE BLOOD CELLS URINE: 1 /HPF

## 2025-06-20 ENCOUNTER — APPOINTMENT (OUTPATIENT)
Dept: VASCULAR SURGERY | Facility: CLINIC | Age: 80
End: 2025-06-20

## 2025-06-20 VITALS
RESPIRATION RATE: 18 BRPM | SYSTOLIC BLOOD PRESSURE: 111 MMHG | HEART RATE: 89 BPM | OXYGEN SATURATION: 95 % | BODY MASS INDEX: 26.68 KG/M2 | WEIGHT: 145 LBS | TEMPERATURE: 98.2 F | HEIGHT: 62 IN | DIASTOLIC BLOOD PRESSURE: 79 MMHG

## 2025-06-20 PROCEDURE — 99214 OFFICE O/P EST MOD 30 MIN: CPT

## 2025-06-20 RX ORDER — CILOSTAZOL 100 MG/1
100 TABLET ORAL TWICE DAILY
Qty: 180 | Refills: 2 | Status: ACTIVE | COMMUNITY
Start: 2025-06-20 | End: 1900-01-01

## 2025-06-28 ENCOUNTER — RX RENEWAL (OUTPATIENT)
Age: 80
End: 2025-06-28

## 2025-07-10 NOTE — ED ADULT NURSE NOTE - NS ED NURSE LEVEL OF CONSCIOUSNESS MENTAL STATUS
Rx Refill Note  Requested Prescriptions     Pending Prescriptions Disp Refills    metoprolol tartrate (LOPRESSOR) 25 MG tablet [Pharmacy Med Name: METOPROLOL TARTRATE 25 MG TAB] 270 tablet 1     Sig: TAKE 1.5 TABLETS BY MOUTH EVERY 12 (TWELVE) HOURS.      Last office visit with prescribing clinician: 1/3/2025     Next office visit with prescribing clinician: 10/3/2025                         Carrie Adorno MA  07/10/25, 08:25 EDT  
Awake

## 2025-07-16 ENCOUNTER — RX RENEWAL (OUTPATIENT)
Age: 80
End: 2025-07-16

## 2025-07-29 ENCOUNTER — APPOINTMENT (OUTPATIENT)
Dept: ELECTROPHYSIOLOGY | Facility: CLINIC | Age: 80
End: 2025-07-29
Payer: MEDICARE

## 2025-07-29 ENCOUNTER — NON-APPOINTMENT (OUTPATIENT)
Age: 80
End: 2025-07-29

## 2025-07-29 PROCEDURE — 93296 REM INTERROG EVL PM/IDS: CPT

## 2025-07-29 PROCEDURE — 93295 DEV INTERROG REMOTE 1/2/MLT: CPT

## 2025-08-04 ENCOUNTER — NON-APPOINTMENT (OUTPATIENT)
Age: 80
End: 2025-08-04

## (undated) DEVICE — DENTURE CUP PINK

## (undated) DEVICE — DRSG CURITY GAUZE SPONGE 4 X 4" 12-PLY NON-STERILE

## (undated) DEVICE — BIOPSY FORCEP COLD DISP

## (undated) DEVICE — BITE BLOCK ADULT 20 X 27MM (GREEN)

## (undated) DEVICE — DRSG 2X2

## (undated) DEVICE — TUBING IV SET GRAVITY 3Y 100" MACRO

## (undated) DEVICE — TUBING MEDI-VAC W MAXIGRIP CONNECTORS 1/4"X6'

## (undated) DEVICE — ELCTR ECG CONDUCTIVE ADHESIVE

## (undated) DEVICE — CATH IV SAFE BC 22G X 1" (BLUE)

## (undated) DEVICE — CONTAINER FORMALIN 80ML YELLOW

## (undated) DEVICE — GOWN LG

## (undated) DEVICE — LUBRICATING JELLY HR ONE SHOT 3G

## (undated) DEVICE — CLAMP BX HOT RAD JAW 3

## (undated) DEVICE — SALIVA EJECTOR (BLUE)

## (undated) DEVICE — BIOPSY FORCEP RADIAL JAW 4 STANDARD WITH NEEDLE

## (undated) DEVICE — DRSG BANDAID 0.75X3"

## (undated) DEVICE — BASIN EMESIS 10IN GRADUATED MAUVE

## (undated) DEVICE — UNDERPAD LINEN SAVER 17 X 24"

## (undated) DEVICE — PACK IV START WITH CHG